# Patient Record
Sex: FEMALE | Race: ASIAN | NOT HISPANIC OR LATINO | Employment: OTHER | URBAN - METROPOLITAN AREA
[De-identification: names, ages, dates, MRNs, and addresses within clinical notes are randomized per-mention and may not be internally consistent; named-entity substitution may affect disease eponyms.]

---

## 2017-02-21 NOTE — TELEPHONE ENCOUNTER
Last seen: 12/02/16 by Dr. Najera  Next appt: 06/01/17 with Dr. Najera    Was the patient seen in the last year in this department? Yes   Does patient have an active prescription for medications requested? No   Received Request Via: Pharmacy

## 2017-02-22 RX ORDER — ATORVASTATIN CALCIUM 20 MG/1
TABLET, FILM COATED ORAL
Qty: 90 TAB | Refills: 2 | Status: SHIPPED | OUTPATIENT
Start: 2017-02-22 | End: 2017-10-12 | Stop reason: SDUPTHER

## 2017-09-05 ENCOUNTER — APPOINTMENT (OUTPATIENT)
Dept: SOCIAL WORK | Facility: CLINIC | Age: 77
End: 2017-09-05

## 2017-09-05 PROCEDURE — 90686 IIV4 VACC NO PRSV 0.5 ML IM: CPT | Performed by: REGISTERED NURSE

## 2017-09-25 ENCOUNTER — TELEPHONE (OUTPATIENT)
Dept: INTERNAL MEDICINE | Facility: MEDICAL CENTER | Age: 77
End: 2017-09-25

## 2017-09-25 DIAGNOSIS — E55.9 VITAMIN D DEFICIENCY: ICD-10-CM

## 2017-09-25 DIAGNOSIS — M85.80 OSTEOPENIA, UNSPECIFIED LOCATION: ICD-10-CM

## 2017-09-25 DIAGNOSIS — E78.5 HYPERLIPIDEMIA, UNSPECIFIED HYPERLIPIDEMIA TYPE: ICD-10-CM

## 2017-09-25 DIAGNOSIS — Z13.29 SCREENING FOR HYPOTHYROIDISM: ICD-10-CM

## 2017-09-25 NOTE — TELEPHONE ENCOUNTER
----- Message from Kirk Valladares sent at 2017  2:12 PM PDT -----  Regarding: NEW LAB ORDERS/SEES DR. BIRCH  Pt would like new lab orders sent to [facility]. Pt's labs on file are . Pt would like a call once labs have been faxed over.

## 2017-10-12 ENCOUNTER — OFFICE VISIT (OUTPATIENT)
Dept: INTERNAL MEDICINE | Facility: MEDICAL CENTER | Age: 77
End: 2017-10-12
Payer: COMMERCIAL

## 2017-10-12 VITALS
SYSTOLIC BLOOD PRESSURE: 90 MMHG | HEIGHT: 61 IN | WEIGHT: 109.2 LBS | DIASTOLIC BLOOD PRESSURE: 58 MMHG | HEART RATE: 68 BPM | BODY MASS INDEX: 20.62 KG/M2 | TEMPERATURE: 97.6 F | OXYGEN SATURATION: 95 %

## 2017-10-12 DIAGNOSIS — E78.5 HYPERLIPIDEMIA, UNSPECIFIED HYPERLIPIDEMIA TYPE: ICD-10-CM

## 2017-10-12 DIAGNOSIS — Z12.39 SCREENING FOR BREAST CANCER: ICD-10-CM

## 2017-10-12 DIAGNOSIS — E55.9 VITAMIN D DEFICIENCY: ICD-10-CM

## 2017-10-12 DIAGNOSIS — Z98.890 S/P COLONOSCOPY: ICD-10-CM

## 2017-10-12 DIAGNOSIS — H81.13 BENIGN PAROXYSMAL POSITIONAL VERTIGO DUE TO BILATERAL VESTIBULAR DISORDER: ICD-10-CM

## 2017-10-12 DIAGNOSIS — M85.80 OSTEOPENIA, UNSPECIFIED LOCATION: ICD-10-CM

## 2017-10-12 PROCEDURE — 99397 PER PM REEVAL EST PAT 65+ YR: CPT | Performed by: INTERNAL MEDICINE

## 2017-10-12 RX ORDER — ATORVASTATIN CALCIUM 20 MG/1
TABLET, FILM COATED ORAL
Qty: 90 TAB | Refills: 3 | Status: SHIPPED | OUTPATIENT
Start: 2017-10-12 | End: 2019-01-24 | Stop reason: SDUPTHER

## 2017-10-12 ASSESSMENT — PATIENT HEALTH QUESTIONNAIRE - PHQ9: CLINICAL INTERPRETATION OF PHQ2 SCORE: 0

## 2017-10-12 NOTE — PROGRESS NOTES
Chief Complaint   Patient presents with   • Annual Exam     preventative         HPI:  Antonio Treviño is a 76 y.o. here for Medicare Annual Wellness Visit She has no complaints today. 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     • therapeutic multivitamin-minerals (THERAGRAN-M) TABS Take 1 Tab by mouth every day.       • Vitamins-Lipotropics (LIPOFLAVONOID) TABS Take  by mouth.       • tizanidine (ZANAFLEX) 4 MG Tab Take 4 mg by mouth every 6 hours as needed.       No current facility-administered medications for this visit.             Current supplements as per medication list.       Allergies: Codeine and Morphine    Current social contact/activities: goes to [employer], walks and hiking     She  reports that she has never smoked. She has never used smokeless tobacco. She reports that she does not drink alcohol or use drugs.  Counseling given: Not Answered        DPA/Advanced Directive:  Patient has Living Will, but it is not on file. Instructed to bring in a copy to scan into their chart.      ROS:    Gait: Uses no assistive device   Ostomy: no   Other tubes: no   Amputations: no   Chronic oxygen use: no   Last eye exam:2016. Cataract extraction left eye.    : Denies incontinence.   In July patient had brief less than 24-hour episode of vertigo. Resolved spontaneously    Screening:    Depression Screening    Little  interest or pleasure in doing things?  0 - not at all  Feeling down, depressed , or hopeless? 0 - not at all  Patient Health Questionnaire Score: 0     If depressive symptoms identified deferred to follow up visit unless specifically addressed in assessment and plan.    Interpretation of PHQ-9 Total Score   Score Severity   1-4 No Depression   5-9 Mild Depression   10-14 Moderate Depression   15-19 Moderately Severe Depression   20-27 Severe Depression    Screening for Cognitive Impairment    Three Minute Recall (apple, watch, flora)   /3    Draw clock face with all 12 numbers set to the hand to show 10 minutes past 11 o'clock       Cognitive concerns identified deferred for follow up unless specifically addressed in assessment and plan.    Fall Risk Assessment    Has the patient had two or more falls in the last year or any fall with injury in the last year?  No    Safety Assessment    Throw rugs on floor.     Handrails on all stairs.     Good lighting in all hallways.     Difficulty hearing.     Patient counseled about all safety risks that were identified.    Functional Assessment ADLs    Are there any barriers preventing you from cooking for yourself or meeting nutritional needs?   .    Are there any barriers preventing you from driving safely or obtaining transportation?   .    Are there any barriers preventing you from using a telephone or calling for help?   .    Are there any barriers preventing you from shopping?   .    Are there any barriers preventing you from taking care of your own finances?   .    Are there any barriers preventing you from managing your medications?   .    Are currently engaging any exercise or physical activity?   .       Health Maintenance Summary                IMM DTaP/Tdap/Td Vaccine Overdue 12[birth year]1/1958     PAP SMEAR Overdue 3/1/2016      Done 3/1/2013 PAP IG, RFX HPV ASCU     Patient has more history with this topic...    MAMMOGRAM Overdue 8/25/2017      Done 8/25/2016 MA-SCREEN  "MAMMO W/CAD-BILAT     Patient has more history with this topic...    BONE DENSITY Next Due 10/19/2021      Done 10/19/2016 DS-BONE DENSITY STUDY (DEXA)     Patient has more history with this topic...    COLONOSCOPY Next Due 4/30/2024      Not specified 4/30/2014           Patient Care Team:  Woo Najera M.D. as PCP - General (Internal Medicine)      Social History   Substance Use Topics   • Smoking status: Never Smoker   • Smokeless tobacco: Never Used   • Alcohol use No     History reviewed. No pertinent family history.  She  has a past medical history of Dry eyes; Heart murmur; Hyperlipidemia; Osteopenia; OSTEOPOROSIS; and Vitiligo.   Past Surgical History:   Procedure Laterality Date   • CATARACT PHACO WITH IOL  7/2/2013    Performed by Bin Cardona M.D. at [facility]   • TENDON TRANSFER         Exam:     Blood pressure (!) 90/58, pulse 68, temperature 36.4 °C (97.6 °F), height 1.549 m (5' 1\"), weight 49.5 kg (109 lb 3.2 oz), SpO2 95 %. Body mass index is 20.63 kg/m².    Hearing excellent.    Dentition good  Alert, oriented in no acute distress.  Eye contact is good, speech goal directed, affect calm      Assessment and Plan. The following treatment and monitoring plan is recommended: 100  1. Osteopenia, unspecified location     2. Vitamin D deficiency     3. Hyperlipidemia, unspecified hyperlipidemia type     4. 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 S/P colonoscopy-2011 neg in [place]     6. Screening for breast cancer     1. Managed with vitamin D and weightbearing exercises. Continue. Patient status post at least 6 years of bisphosphonate therapy. Currently on drug holiday. Will repeat DEXA scan next year.  2.. Most recent vitamin D level 39.2 Continue vitamin D supplementation  3. Continue with atorvastatin. Repeat lipid panel  4. Stable. Consider use of Apley maneuvers. Recurrent  5. She currently is up-to-date on screening protocol. She is " performing mammograms every other year. Mammogram last year negative for breast cancer. We will order a bit test.        Services suggested: No services needed at this time  Health Care Screening: Age-appropriate preventive services Medicare covers discussed today and ordered if indicated.  Referrals offered: Community-based lifestyle interventions to reduce health risks and promote self-management and wellness, fall prevention, nutrition, physical activity, tobacco-use cessation, weight loss, and mental health services as per orders if indicated.    Discussion today about general wellness and lifestyle habits:    · Prevent falls and reduce trip hazards; Cautioned about securing or removing rugs.  · Have a working fire alarm and carbon monoxide detector;   · Engage in regular physical activity and social activities       Follow-up: No Follow-up on file.

## 2018-08-10 ENCOUNTER — HOSPITAL ENCOUNTER (OUTPATIENT)
Dept: RADIOLOGY | Facility: MEDICAL CENTER | Age: 78
End: 2018-08-10
Attending: OBSTETRICS & GYNECOLOGY
Payer: COMMERCIAL

## 2018-08-10 DIAGNOSIS — Z12.31 VISIT FOR SCREENING MAMMOGRAM: ICD-10-CM

## 2018-08-10 PROCEDURE — 77067 SCR MAMMO BI INCL CAD: CPT

## 2018-09-26 LAB
ALBUMIN SERPL-MCNC: 4.3 G/DL (ref 3.5–4.8)
ALBUMIN/GLOB SERPL: 1.6 {RATIO} (ref 1.2–2.2)
ALP SERPL-CCNC: 73 IU/L (ref 39–117)
ALT SERPL-CCNC: 16 IU/L (ref 0–32)
AST SERPL-CCNC: 20 IU/L (ref 0–40)
BILIRUB SERPL-MCNC: 0.5 MG/DL (ref 0–1.2)
BUN SERPL-MCNC: 15 MG/DL (ref 8–27)
BUN/CREAT SERPL: 19 (ref 12–28)
CALCIUM SERPL-MCNC: 9.8 MG/DL (ref 8.7–10.3)
CHLORIDE SERPL-SCNC: 104 MMOL/L (ref 96–106)
CHOLEST SERPL-MCNC: 246 MG/DL (ref 100–199)
CO2 SERPL-SCNC: 24 MMOL/L (ref 20–29)
CREAT SERPL-MCNC: 0.81 MG/DL (ref 0.57–1)
GLOBULIN SER CALC-MCNC: 2.7 G/DL (ref 1.5–4.5)
GLUCOSE SERPL-MCNC: 98 MG/DL (ref 65–99)
HDLC SERPL-MCNC: 63 MG/DL
IF AFRICAN AMERICAN  100797: 80 ML/MIN/1.73
IF NON AFRICAN AMER 100791: 70 ML/MIN/1.73
LABORATORY COMMENT REPORT: ABNORMAL
LDLC SERPL CALC-MCNC: 163 MG/DL (ref 0–99)
POTASSIUM SERPL-SCNC: 4.5 MMOL/L (ref 3.5–5.2)
PROT SERPL-MCNC: 7 G/DL (ref 6–8.5)
SODIUM SERPL-SCNC: 142 MMOL/L (ref 134–144)
TRIGL SERPL-MCNC: 101 MG/DL (ref 0–149)
VLDLC SERPL CALC-MCNC: 20 MG/DL (ref 5–40)

## 2019-01-24 ENCOUNTER — OFFICE VISIT (OUTPATIENT)
Dept: INTERNAL MEDICINE | Facility: MEDICAL CENTER | Age: 79
End: 2019-01-24
Payer: COMMERCIAL

## 2019-01-24 VITALS
DIASTOLIC BLOOD PRESSURE: 60 MMHG | OXYGEN SATURATION: 94 % | RESPIRATION RATE: 20 BRPM | HEART RATE: 68 BPM | TEMPERATURE: 97.6 F | SYSTOLIC BLOOD PRESSURE: 110 MMHG | BODY MASS INDEX: 20.09 KG/M2 | WEIGHT: 109.2 LBS | HEIGHT: 62 IN

## 2019-01-24 DIAGNOSIS — H81.13 BENIGN PAROXYSMAL POSITIONAL VERTIGO DUE TO BILATERAL VESTIBULAR DISORDER: ICD-10-CM

## 2019-01-24 DIAGNOSIS — E55.9 VITAMIN D DEFICIENCY: ICD-10-CM

## 2019-01-24 DIAGNOSIS — S32.030S CLOSED COMPRESSION FRACTURE OF THIRD LUMBAR VERTEBRA, SEQUELA: ICD-10-CM

## 2019-01-24 DIAGNOSIS — E78.5 HYPERLIPIDEMIA, UNSPECIFIED HYPERLIPIDEMIA TYPE: ICD-10-CM

## 2019-01-24 DIAGNOSIS — M85.80 OSTEOPENIA, UNSPECIFIED LOCATION: ICD-10-CM

## 2019-01-24 PROCEDURE — G0439 PPPS, SUBSEQ VISIT: HCPCS | Performed by: INTERNAL MEDICINE

## 2019-01-24 RX ORDER — ATORVASTATIN CALCIUM 20 MG/1
TABLET, FILM COATED ORAL
Qty: 90 TAB | Refills: 0 | Status: SHIPPED | OUTPATIENT
Start: 2019-01-24 | End: 2019-05-14

## 2019-01-24 RX ORDER — CELECOXIB 200 MG/1
200 CAPSULE ORAL DAILY
COMMUNITY
End: 2019-10-28

## 2019-01-24 NOTE — PATIENT INSTRUCTIONS
Schedule bone density test.  I will refer you to endocrine  Follow up with Celine  Get labs in one month  Stop the atorvastatin

## 2019-01-24 NOTE — PROGRESS NOTES
Chief Complaint   Patient presents with   • Annual Exam   • Medication Refill     lipitor          HPI:  Antonio Treviño is a 78 y.o. here for Medicare Annual Wellness Visit   She was seen by Dr. Berger at pain clinic for tingling and numbness for several months. Numbness in the left foot to ankle and right foot, ankle, leg to mid thigh. No pain. Walking makes it worse.Symptoms for 6 months. Gradually.No weakness.  She received an epidural injection and prescribed Celebrex in December. No effect on numnbess. She is seeing him tomorrow. She had another MRI and will go over the results with him tomorrow.   She was having cramps in the lgs. And then she stopped all the medications. She went back on the medications and no cramps. She is taking a statin. She saw her cholesterol level in sept and she resumed the medication  She had a bone density in 2016 with Hip fracture risk 4.3. In the past she took Actonel  X 5 years over 6-7 years ago. History of compression fracture of vertebrate and left elbow.  Takes celebrex for back pain for 1.5 month from dr. Berger.     Patient Active Problem List    Diagnosis Date Noted   • Closed compression fracture of third lumbar vertebra (HCC) 10/07/2016   • Benign paroxysmal positional vertigo due to bilateral vestibular disorder 10/07/2016   • Vitamin D deficiency 04/30/2014   • Systolic murmur 04/29/2013   • Osteopenia 01/29/2013   • HLD (hyperlipidemia) 01/29/2013   • S/P colonoscopy-2011 neg in [place] 01/29/2013       Current Outpatient Prescriptions   Medication Sig Dispense Refill   • celecoxib (CELEBREX) 200 MG Cap Take 200 mg by mouth every day.     • atorvastatin (LIPITOR) 20 MG Tab Take one tablet by mouth daily 90 Tab 3   • Omega-3 Fatty Acids ([facility] FISH OIL PO) Take  by mouth.     • therapeutic multivitamin-minerals (THERAGRAN-M) TABS Take 1 Tab by mouth every day.       • Vitamins-Lipotropics (LIPOFLAVONOID) TABS Take  by mouth.       • tizanidine (ZANAFLEX) 4 MG Tab Take 4 mg  by mouth every 6 hours as needed.       No current facility-administered medications for this visit.             Current supplements as per medication list.       Allergies: Codeine and Morphine    Current social contact/activities: hiking and walking.      She  reports that she has never smoked. She has never used smokeless tobacco. She reports that she does not drink alcohol or use drugs.  Counseling given: Not Answered      DPA/Advanced Directive:  Patient has Advanced Directive, but it is not on file. Instructed to bring in a copy to scan into their chart.    ROS:    Gait: Uses no assistive device  Ostomy: No  Other tubes: No  Amputations: No  Chronic oxygen use: No  Last eye exam: 1.5 years ago  Wears hearing aids: No   : Denies any urinary leakage during the last 6 months    Screening:  No depression  Depression Screening    Little interest or pleasure in doing things?0     Feeling down, depressed , or hopeless?0     0Trouble falling or staying asleep, or sleeping too much?     Feeling tired or having little energy?     Poor appetite or overeating?     Feeling bad about yourself - or that you are a failure or have let yourself or your family down?    Trouble concentrating on things, such as reading the newspaper or watching television?    Moving or speaking so slowly that other people could have noticed.  Or the opposite - being so fidgety or restless that you have been moving around a lot more than usual?     Thoughts that you would be better off dead, or of hurting yourself?     Patient Health Questionnaire Score:      If depressive symptoms identified deferred to follow up visit unless specifically addressed in assessment and plan.    Interpretation of PHQ-9 Total Score   Score Severity   1-4 No Depression   5-9 Mild Depression   10-14 Moderate Depression   15-19 Moderately Severe Depression   20-27 Severe Depression      Screening for Cognitive Impairment    Three Minute Recall (leader, season, table)   /3    Juice clock face with all 12 numbers and set the hands to show 10 past 11.       Cognitive concerns identified deferred for follow up unless specifically addressed in assessment and plan.    Fall Risk Assessment    Has the patient had two or more falls in the last year or any fall with injury in the last year?       Safety Assessment    Throw rugs on floor.     Handrails on all stairs.     Good lighting in all hallways.     Difficulty hearing.     Patient counseled about all safety risks that were identified.    Functional Assessment ADLs    Are there any barriers preventing you from cooking for yourself or meeting nutritional needs?   .    Are there any barriers preventing you from driving safely or obtaining transportation?   .    Are there any barriers preventing you from using a telephone or calling for help?   .    Are there any barriers preventing you from shopping?   .    Are there any barriers preventing you from taking care of your own finances?   .    Are there any barriers preventing you from managing your medications?   .    Are there any barriers preventing you from showering, bathing or dressing yourself?  .    Are you currently engaging in any exercise or physical activity?   .     What is your perception of your health?   .      Health Maintenance Summary                IMM DTaP/Tdap/Td Vaccine Overdue 12/1/1959     IMM ZOSTER VACCINES Overdue 8/24/2012      Done 6/29/2012 Imm Admin: Zoster Vaccine Live (ZVL) (Zostavax)    PAP SMEAR Overdue 3/1/2016      Done 3/1/2013 PAP IG, RFX HPV ASCU     Patient has more history with this topic...    IMM INFLUENZA Overdue 9/1/2018      Done 9/5/2017 Imm Admin: Influenza Vaccine Quad Inj (Pf)     Patient has more history with this topic...    MAMMOGRAM Next Due 8/10/2019      Done 8/10/2018 MA-MAMMO SCREENING BILAT W/TOMOSYNTHESIS W/CAD     Patient has more history with this topic...    BONE DENSITY Next Due 10/19/2021      Done 10/19/2016 DS-BONE DENSITY  "STUDY (DEXA)     Patient has more history with this topic...    COLONOSCOPY Next Due 4/30/2024      Not specified 4/30/2014           Patient Care Team:  Woo Najera M.D. as PCP - General (Internal Medicine)      Social History   Substance Use Topics   • Smoking status: Never Smoker   • Smokeless tobacco: Never Used   • Alcohol use No     No family history on file.  She  has a past medical history of Dry eyes; Heart murmur; Hyperlipidemia; Osteopenia; OSTEOPOROSIS; and Vitiligo.   Past Surgical History:   Procedure Laterality Date   • CATARACT PHACO WITH IOL  7/2/2013    Performed by Bin Cardona M.D. at [facility]   • TENDON TRANSFER         Exam:   Blood pressure 110/60, pulse 68, temperature 36.4 °C (97.6 °F), temperature source Temporal, resp. rate 20, height 1.57 m (5' 1.81\"), weight 49.5 kg (109 lb 3.2 oz), SpO2 94 %, not currently breastfeeding. Body mass index is 20.1 kg/m².    Hearing excellent.    Dentition good  Alert, oriented in no acute distress.  Eye contact is good, speech goal directed, affect calm    Assessment and Plan. The following treatment and monitoring plan is recommended:  1.  History of compression fracture of the lumbar spine.  Patient completed 5 years of bisphosphonate therapy prior to that.  Has been off of bisphosphonate therapy for 6 or 7 years.  Is interested in using Prolia as a treatment.  I will refer her to endocrinology for an evaluation.  We will also order a bone density.  Continue vitamin D and calcium supplementation.  2.  Vitamin D deficiency-continue vitamin D supplementation.  3.  History of BPPV responded well to Epley maneuvers in the past.  She can continue this in the event of another flare.  4.  Dyslipidemia we discussed the use of statin therapy in primary prevention.  At this point she wants to discontinue which is reasonable.  No diagnosis found.      Services suggested: No services needed at this time  Health Care Screening: " Age-appropriate preventive services recommended by USPTF and ACIP covered by Medicare were discussed today. Services ordered if indicated and agreed upon by the patient.  Referrals offered: Community-based lifestyle interventions to reduce health risks and promote self-management and wellness, fall prevention, nutrition, physical activity, tobacco-use cessation, weight loss, and mental health services as per orders if indicated.    Discussion today about general wellness and lifestyle habits:    · Prevent falls and reduce trip hazards; Cautioned about securing or removing rugs.  · Have a working fire alarm and carbon monoxide detector;   · Engage in regular physical activity and social activities     Follow-up: No Follow-up on file.

## 2019-02-01 ENCOUNTER — HOSPITAL ENCOUNTER (OUTPATIENT)
Dept: RADIOLOGY | Facility: MEDICAL CENTER | Age: 79
End: 2019-02-01
Attending: INTERNAL MEDICINE
Payer: COMMERCIAL

## 2019-02-01 DIAGNOSIS — M85.80 OSTEOPENIA, UNSPECIFIED LOCATION: ICD-10-CM

## 2019-02-01 DIAGNOSIS — S32.030S CLOSED COMPRESSION FRACTURE OF THIRD LUMBAR VERTEBRA, SEQUELA: ICD-10-CM

## 2019-02-01 DIAGNOSIS — E55.9 VITAMIN D DEFICIENCY: ICD-10-CM

## 2019-02-01 PROCEDURE — 77080 DXA BONE DENSITY AXIAL: CPT

## 2019-02-27 ENCOUNTER — HOSPITAL ENCOUNTER (OUTPATIENT)
Dept: LAB | Facility: MEDICAL CENTER | Age: 79
End: 2019-02-27
Attending: INTERNAL MEDICINE
Payer: COMMERCIAL

## 2019-02-27 DIAGNOSIS — S32.030S CLOSED COMPRESSION FRACTURE OF THIRD LUMBAR VERTEBRA, SEQUELA: ICD-10-CM

## 2019-02-27 DIAGNOSIS — M85.80 OSTEOPENIA, UNSPECIFIED LOCATION: ICD-10-CM

## 2019-02-27 DIAGNOSIS — E55.9 VITAMIN D DEFICIENCY: ICD-10-CM

## 2019-02-27 LAB
25(OH)D3 SERPL-MCNC: 39 NG/ML (ref 30–100)
ALBUMIN SERPL BCP-MCNC: 4.1 G/DL (ref 3.2–4.9)
ALBUMIN/GLOB SERPL: 1.6 G/DL
ALP SERPL-CCNC: 62 U/L (ref 30–99)
ALT SERPL-CCNC: 16 U/L (ref 2–50)
ANION GAP SERPL CALC-SCNC: 7 MMOL/L (ref 0–11.9)
AST SERPL-CCNC: 22 U/L (ref 12–45)
BASOPHILS # BLD AUTO: 0.7 % (ref 0–1.8)
BASOPHILS # BLD: 0.03 K/UL (ref 0–0.12)
BILIRUB SERPL-MCNC: 0.7 MG/DL (ref 0.1–1.5)
BUN SERPL-MCNC: 19 MG/DL (ref 8–22)
CALCIUM SERPL-MCNC: 9.4 MG/DL (ref 8.5–10.5)
CHLORIDE SERPL-SCNC: 105 MMOL/L (ref 96–112)
CHOLEST SERPL-MCNC: 233 MG/DL (ref 100–199)
CO2 SERPL-SCNC: 28 MMOL/L (ref 20–33)
CREAT SERPL-MCNC: 0.79 MG/DL (ref 0.5–1.4)
EOSINOPHIL # BLD AUTO: 0.06 K/UL (ref 0–0.51)
EOSINOPHIL NFR BLD: 1.5 % (ref 0–6.9)
ERYTHROCYTE [DISTWIDTH] IN BLOOD BY AUTOMATED COUNT: 45.1 FL (ref 35.9–50)
FASTING STATUS PATIENT QL REPORTED: NORMAL
GLOBULIN SER CALC-MCNC: 2.6 G/DL (ref 1.9–3.5)
GLUCOSE SERPL-MCNC: 90 MG/DL (ref 65–99)
HCT VFR BLD AUTO: 44.6 % (ref 37–47)
HDLC SERPL-MCNC: 58 MG/DL
HGB BLD-MCNC: 14.9 G/DL (ref 12–16)
IMM GRANULOCYTES # BLD AUTO: 0.01 K/UL (ref 0–0.11)
IMM GRANULOCYTES NFR BLD AUTO: 0.2 % (ref 0–0.9)
LDLC SERPL CALC-MCNC: 156 MG/DL
LYMPHOCYTES # BLD AUTO: 0.99 K/UL (ref 1–4.8)
LYMPHOCYTES NFR BLD: 24.1 % (ref 22–41)
MCH RBC QN AUTO: 33.7 PG (ref 27–33)
MCHC RBC AUTO-ENTMCNC: 33.4 G/DL (ref 33.6–35)
MCV RBC AUTO: 100.9 FL (ref 81.4–97.8)
MONOCYTES # BLD AUTO: 0.43 K/UL (ref 0–0.85)
MONOCYTES NFR BLD AUTO: 10.5 % (ref 0–13.4)
NEUTROPHILS # BLD AUTO: 2.58 K/UL (ref 2–7.15)
NEUTROPHILS NFR BLD: 63 % (ref 44–72)
NRBC # BLD AUTO: 0 K/UL
NRBC BLD-RTO: 0 /100 WBC
PLATELET # BLD AUTO: 239 K/UL (ref 164–446)
PMV BLD AUTO: 11.4 FL (ref 9–12.9)
POTASSIUM SERPL-SCNC: 3.9 MMOL/L (ref 3.6–5.5)
PROT SERPL-MCNC: 6.7 G/DL (ref 6–8.2)
RBC # BLD AUTO: 4.42 M/UL (ref 4.2–5.4)
SODIUM SERPL-SCNC: 140 MMOL/L (ref 135–145)
TRIGL SERPL-MCNC: 93 MG/DL (ref 0–149)
TSH SERPL DL<=0.005 MIU/L-ACNC: 4.9 UIU/ML (ref 0.38–5.33)
WBC # BLD AUTO: 4.1 K/UL (ref 4.8–10.8)

## 2019-02-27 PROCEDURE — 80061 LIPID PANEL: CPT

## 2019-02-27 PROCEDURE — 80053 COMPREHEN METABOLIC PANEL: CPT

## 2019-02-27 PROCEDURE — 85025 COMPLETE CBC W/AUTO DIFF WBC: CPT

## 2019-02-27 PROCEDURE — 84443 ASSAY THYROID STIM HORMONE: CPT

## 2019-02-27 PROCEDURE — 36415 COLL VENOUS BLD VENIPUNCTURE: CPT

## 2019-02-27 PROCEDURE — 82306 VITAMIN D 25 HYDROXY: CPT

## 2019-03-05 ENCOUNTER — OFFICE VISIT (OUTPATIENT)
Dept: INTERNAL MEDICINE | Facility: MEDICAL CENTER | Age: 79
End: 2019-03-05
Payer: COMMERCIAL

## 2019-03-05 VITALS
BODY MASS INDEX: 20.32 KG/M2 | DIASTOLIC BLOOD PRESSURE: 60 MMHG | TEMPERATURE: 97.5 F | HEART RATE: 66 BPM | SYSTOLIC BLOOD PRESSURE: 90 MMHG | HEIGHT: 62 IN | WEIGHT: 110.4 LBS | OXYGEN SATURATION: 94 %

## 2019-03-05 DIAGNOSIS — M85.80 OSTEOPENIA, UNSPECIFIED LOCATION: ICD-10-CM

## 2019-03-05 DIAGNOSIS — E78.5 HYPERLIPIDEMIA, UNSPECIFIED HYPERLIPIDEMIA TYPE: ICD-10-CM

## 2019-03-05 PROCEDURE — 99213 OFFICE O/P EST LOW 20 MIN: CPT | Performed by: INTERNAL MEDICINE

## 2019-03-05 RX ORDER — GABAPENTIN 100 MG/1
CAPSULE ORAL
COMMUNITY
Start: 2019-02-20 | End: 2019-07-30

## 2019-03-05 ASSESSMENT — PATIENT HEALTH QUESTIONNAIRE - PHQ9: CLINICAL INTERPRETATION OF PHQ2 SCORE: 0

## 2019-03-05 NOTE — PROGRESS NOTES
Established Patient    Ms. Calderon a 78 y.o. female who presents today with:  CC: Follow-Up (closed compression fracture)        Assessment and Plan    1.  Osteopenia-she has a history of osteoporotic fracture.  She has been prescribed bisphosphonate therapy for greater than 5 years.  Her most recent bone density shows osteopenia in the left hip with a hip fracture risk of 5.3%.  She has an appointment pending with endocrinology.  To discuss treatment options.  In the past she has had side effects with bisphosphonates plus she is been treated for more than 5 years.  Suspect that she will be a candidate for Prolia.  Continue vitamin D supplementation although she may increase vitamin D to achieve a serum level of 50.    2.  Hyperlipidemia-she had statin induced myalgias.  She is over the age of 74.  She does not have many risk factors for arterial sclerosis.  We discussed the use of statins for primary prevention.  She does not like to take medications.  We both agree that we will discontinue statin therapy for now.    Current Outpatient Prescriptions   Medication Sig Dispense Refill   • gabapentin (NEURONTIN) 100 MG Cap      • celecoxib (CELEBREX) 200 MG Cap Take 200 mg by mouth every day.     • Omega-3 Fatty Acids ([facility] FISH OIL PO) Take  by mouth.     • therapeutic multivitamin-minerals (THERAGRAN-M) TABS Take 1 Tab by mouth every day.       • atorvastatin (LIPITOR) 20 MG Tab Take one tablet by mouth daily (Patient not taking: Reported on 3/5/2019) 90 Tab 0   • Vitamins-Lipotropics (LIPOFLAVONOID) TABS Take  by mouth.         No current facility-administered medications for this visit.          followup No Follow-up on file.    This note was created using voice recognition software (Dragon). The accuracy of the dictation is limited by the abilities of the software. I have reviewed the note prior to signing, however some errors in grammar and context are still possible. If you have any questions related to this note  please do not hesitate to contact our office.   _______________________________________________________    HPI:   List: osteopenia and fracture, lipids  Ms. Treviño is here for follow-up for dyslipidemia and osteopenia.  She was being treated with statin for primary prevention.  In October 2018 she developed arthralgias and muscle aches and she discontinue the medication.  Symptoms resolved.  Subsequent lipid panel in September 2018 total cholesterol 246 triglycerides 101 HDL 63 .  She resume the statins late last year and developed similar symptoms and she discontinued it again about a month ago.  Subsequent lipid panel in February 2019 total cholesterol 233, triglycerides 93, HDL 58, .  She does not smoke.  She has no impaired fasting glucose or diabetes.  No family history.  Diet consists mostly of vegetables and fish.  She exercises daily.  Her BMI is 20.32.  She has a history of a lumbar compression fracture diagnosed via MRI of the lumbar spine January 2019.  She had a prior history of osteopenia/osteoporosis and was treated with bisphosphonate therapy for greater than 5 years.  She is not receive therapy in the form of bisphosphonates for several years.  She has a history of gastroesophageal reflux disease and bisphosphonate therapy exacerbated this.  Her most recent bone density result we discussed today.  She has normal bone density throughout except for the left femur which is osteopenic.  Her hip fracture risk is 5.3 major osteoporotic is 15%.  Her most recent vitamin D level is 39.  She does weightbearing exercises every day.  She does not smoke.  No chronic steroids.  No alcohol use.  No family history of osteoporosis but she does have a compression fracture.  At the last visit I referred her to endocrinology.  She has an appointment pending with them in May.  She is not interested in bisphosphonate therapy we discussed alternatives such as Forteo or Prolia.  She will discussed this with  "endocrinology at that consultation.         has a past medical history of Dry eyes; Heart murmur; Hyperlipidemia; Osteopenia; OSTEOPOROSIS; and Vitiligo.     reports that she has never smoked. She has never used smokeless tobacco. She reports that she does not drink alcohol or use drugs.      ROS: Pertinent positives as stated in HPI, all others reviewed as negative:        Physical Exam  BP (!) 90/60 (BP Location: Left arm, Patient Position: Sitting, BP Cuff Size: Adult)   Pulse 66   Temp 36.4 °C (97.5 °F) (Temporal)   Ht 1.57 m (5' 1.81\")   Wt 50.1 kg (110 lb 6.4 oz)   SpO2 94%   BMI 20.32 kg/m²   Constitutional:  oriented to person, place, and time. No distress.       Current Outpatient Prescriptions on File Prior to Visit   Medication Sig Dispense Refill   • celecoxib (CELEBREX) 200 MG Cap Take 200 mg by mouth every day.     • Omega-3 Fatty Acids ([facility] FISH OIL PO) Take  by mouth.     • therapeutic multivitamin-minerals (THERAGRAN-M) TABS Take 1 Tab by mouth every day.       • atorvastatin (LIPITOR) 20 MG Tab Take one tablet by mouth daily (Patient not taking: Reported on 3/5/2019) 90 Tab 0   • Vitamins-Lipotropics (LIPOFLAVONOID) TABS Take  by mouth.         No current facility-administered medications on file prior to visit.            Signed by: Woo Najera M.D.  "

## 2019-05-14 ENCOUNTER — OFFICE VISIT (OUTPATIENT)
Dept: ENDOCRINOLOGY | Facility: MEDICAL CENTER | Age: 79
End: 2019-05-14
Payer: COMMERCIAL

## 2019-05-14 VITALS
BODY MASS INDEX: 20.43 KG/M2 | HEART RATE: 82 BPM | DIASTOLIC BLOOD PRESSURE: 56 MMHG | SYSTOLIC BLOOD PRESSURE: 106 MMHG | WEIGHT: 111 LBS | HEIGHT: 62 IN | OXYGEN SATURATION: 93 %

## 2019-05-14 DIAGNOSIS — M85.80 OSTEOPENIA, UNSPECIFIED LOCATION: ICD-10-CM

## 2019-05-14 DIAGNOSIS — E55.9 VITAMIN D DEFICIENCY: ICD-10-CM

## 2019-05-14 DIAGNOSIS — Z87.81 HISTORY OF VERTEBRAL COMPRESSION FRACTURE: ICD-10-CM

## 2019-05-14 DIAGNOSIS — M80.80XD LOCALIZED OSTEOPOROSIS WITH CURRENT PATHOLOGICAL FRACTURE WITH ROUTINE HEALING, SUBSEQUENT ENCOUNTER: ICD-10-CM

## 2019-05-14 PROCEDURE — 99204 OFFICE O/P NEW MOD 45 MIN: CPT | Performed by: INTERNAL MEDICINE

## 2019-05-14 NOTE — PROGRESS NOTES
New Patient Consult Note  Primary care physician: Woo Najera M.D.    Reason for consult: Osteopenia    HPI:  Antonio Treviño is a 78 y.o. old patient who comes in today for evaluation of Osteopenia with history of compression fracture of the third lumbar vertebrae and Vitamin D Deficiency.  She was on Actonel 35 mg weekly for over 5 years and stopped in 2012..  Her Dexa scan on 2/1/19 shows the mean bone mineral density for the lumbar spine is 1.072 g/cm2, which corresponds to a T score of -0.8 and a Z score of 1.5.  The proximal left femur has a mean bone mineral density of 0.709 g/cm2, with a T score of -2.4 and a Z score of -0.1.  When compared with the most recent study dated 10/19/2016, there has been a 0.9% decrease in the bone mineral density of the lumbar spine and a 6.3% decrease in the bone mineral density of the left femur.  She is having tightness and tingling around ankles and lower leg with right side more than left.  She had 2 epidermals in her back that did not help.      ROS:  Constitutional: No weight loss  Cardiac: No palpitations or racing heart  Resp: No shortness of breath  Neuro: No numbness or tinging in feet  Endo: No heat or cold intolerance, no polyuria or polydipsia  All other systems were reviewed and were negative.    Past Medical History:  Patient Active Problem List    Diagnosis Date Noted   • Closed compression fracture of third lumbar vertebra (HCC) 10/07/2016   • Benign paroxysmal positional vertigo due to bilateral vestibular disorder 10/07/2016   • Vitamin D deficiency 04/30/2014   • Systolic murmur 04/29/2013   • Osteopenia 01/29/2013   • HLD (hyperlipidemia) 01/29/2013   • S/P colonoscopy-2011 neg in [place] 01/29/2013       Past Surgical History:  Past Surgical History:   Procedure Laterality Date   • CATARACT PHACO WITH IOL  7/2/2013    Performed by Bin Cardona M.D. at [facility]   • TENDON TRANSFER         Allergies:  Codeine and Morphine    Social  "History:  Social History     Social History   • Marital status:      Spouse name: N/A   • Number of children: N/A   • Years of education: N/A     Occupational History   • Not on file.     Social History Main Topics   • Smoking status: Never Smoker   • Smokeless tobacco: Never Used   • Alcohol use No   • Drug use: No   • Sexual activity: Not on file     Other Topics Concern   • Not on file     Social History Narrative   • No narrative on file       Family History:  History reviewed. No pertinent family history.    Medications:    Current Outpatient Prescriptions:   •  Teriparatide, Recombinant, (FORTEO) 600 MCG/2.4ML Solution, Inject 20 mcg as instructed every day., Disp: 1 PEN, Rfl: 11  •  gabapentin (NEURONTIN) 100 MG Cap, , Disp: , Rfl:   •  celecoxib (CELEBREX) 200 MG Cap, Take 200 mg by mouth every day., Disp: , Rfl:   •  Omega-3 Fatty Acids ([employer] FISH OIL PO), Take  by mouth., Disp: , Rfl:   •  therapeutic multivitamin-minerals (THERAGRAN-M) TABS, Take 1 Tab by mouth every day.  , Disp: , Rfl:   •  Vitamins-Lipotropics (LIPOFLAVONOID) TABS, Take  by mouth.  , Disp: , Rfl:     Labs: Reviewed    Physical Examination:  Vital signs: /56   Pulse 82   Ht 1.575 m (5' 2\")   Wt 50.3 kg (111 lb)   SpO2 93%   BMI 20.30 kg/m²  Body mass index is 20.3 kg/m². Patient's body mass index is 20.3 kg/m². Exercise and nutrition counseling were performed at this visit.  Going to physical therapy and doing the exercises for 1 hour daily.  General: No apparent distress, cooperative,thin  Eyes: No scleral icterus or discharge  ENMT: Normal on external inspection of nose, lips, normal thyroid exam  Neck: No abnormal masses on inspection  Resp: Normal effort, clear to auscultation bilaterally   CVS: Regular rate and rhythm, S1 S2 normal, no murmur   Extremities: No edema  Abdomen: no abdominal obesity present  Neuro: Alert and oriented  Skin: No rash  Psych: Normal mood and affect, intact memory and able to make " informed decisions    Assessment and Plan:    1.  Osteoporosis with pathologic fracture of the lumbar vertebrae  Discussed the side effects and benefits of Forteo therapy she will greatly benefit from it.  Paperwork will be sent over today.    2. History of vertebral compression fracture  Plan as per assessment #1.    3. Vitamin D deficiency  continue vitamin D supplementation.     Return in about 3 months (around 8/14/2019).    Thank you for allowing me to participate in the care of this patient.    Efra Mckeon  05/14/19  This note was scribed by Francheska Borjas RN CDE  CC:   Woo Najera M.D.    This note was created using voice recognition software (Dragon). The accuracy of the dictation is limited by the abilities of the software. I have reviewed the note prior to signing, however some errors in grammar and context are still possible. If you have any questions related to this note please do not hesitate to contact our office.

## 2019-05-16 ENCOUNTER — TELEPHONE (OUTPATIENT)
Dept: ENDOCRINOLOGY | Facility: MEDICAL CENTER | Age: 79
End: 2019-05-16

## 2019-05-16 NOTE — TELEPHONE ENCOUNTER
FINAL PRIOR AUTHORIZATION STATUS:    1.  Name of Medication & Dose: Forteo 600mcg     2. Prior Auth Status (if approved--length of approval):  Approved  Approvedtoday  Your PA request has been approved. Additional information will be provided in the approval communication.      3. Action Taken: Pharmacy Notified: yes    Documentation was scanned into media and attached to this telephone encounter.

## 2019-05-21 ENCOUNTER — TELEPHONE (OUTPATIENT)
Dept: ENDOCRINOLOGY | Facility: MEDICAL CENTER | Age: 79
End: 2019-05-21

## 2019-05-21 NOTE — TELEPHONE ENCOUNTER
1. Caller Name: Antonio Treviño                                         Call Back Number: [phone] (home)       Patient approves a detailed voicemail message: yes    starting forteo     Pt will be starting Forteo and needs education on how it give it to her self and may have some other questions regarding the injection itself. It is ready for her to pick it up ay her pharmacy but she needs help with education on it.     Consulted with Angelina and came to conclusion that it would be best if Francheska (one of Dr. KELLER's nurses) could get in contact with her regarding training on giving herself this injection.     Informed the Pt that I will be sending a note to Francheska regarding this and we will be in contact with her.

## 2019-07-29 NOTE — PROGRESS NOTES
"Endocrinology Clinic Progress Note  PCP: Woo Najera M.D.    HPI:  Antonio Treviño is a 78 y.o. old patient who comes in today for review of endocrine problems.    Osteoporosis with history of  pathologic fracture of the lumbar vertebrae:  Started on Forteo 20 mcg daily for 2 1/2 months.  She states her legs feel heavy and swell in the afternoon after starting the Forteo.    Vitamin D Deficiency:  Currently taking Vitamin D daily 5000 units plus another 1000 units with her Calcium.    ROS:  Constitutional: No unintentional weight loss  Endo: Denies excessive thirst or frequent urination  Extremity: Bilateral lower leg heaviness  All other systems were reviewed and were negative.    Past Medical History:  Patient Active Problem List    Diagnosis Date Noted   • Closed compression fracture of third lumbar vertebra (HCC) 10/07/2016   • Benign paroxysmal positional vertigo due to bilateral vestibular disorder 10/07/2016   • Vitamin D deficiency 04/30/2014   • Systolic murmur 04/29/2013   • Osteopenia 01/29/2013   • HLD (hyperlipidemia) 01/29/2013   • S/P colonoscopy-2011 neg in [place] 01/29/2013       Medications:    Current Outpatient Prescriptions:   •  Cholecalciferol (VITAMIN D HIGH POTENCY PO), Take 6,000 Units by mouth every day., Disp: , Rfl:   •  Calcium Carbonate (CALCIUM 600) 600 MG Tab, Take  by mouth., Disp: , Rfl:   •  Teriparatide, Recombinant, (FORTEO) 600 MCG/2.4ML Solution, Inject 20 mcg as instructed every day., Disp: 1 PEN, Rfl: 11  •  celecoxib (CELEBREX) 200 MG Cap, Take 200 mg by mouth every day., Disp: , Rfl:   •  Omega-3 Fatty Acids ([employer]S FISH OIL PO), Take  by mouth., Disp: , Rfl:   •  therapeutic multivitamin-minerals (THERAGRAN-M) TABS, Take 1 Tab by mouth every day.  , Disp: , Rfl:   •  Vitamins-Lipotropics (LIPOFLAVONOID) TABS, Take  by mouth.  , Disp: , Rfl:     Labs: Reviewed    Physical Examination:  Vital signs: /64   Pulse 79   Ht 1.575 m (5' 2\")   Wt 49.9 kg (110 lb)   SpO2 " 94%   BMI 20.12 kg/m²  Body mass index is 20.12 kg/m². Patient's body mass index is 20.12 kg/m². Exercise and nutrition counseling were performed at this visit.  Physical therapy exercises and walking for 45 minutes daily.  General: No apparent distress, cooperative  Eyes: No scleral icterus, no discharge, normal eyelids  Neck: No abnormal masses on inspection, normal thyroid exam  Resp: Normal effort, clear to auscultation bilaterally  CVS: Regular rate and rhythm, S1 S2 normal, no murmur  Extremities: No lower extremity edema  Abdomen: no abdominal obesity present  Musculoskeletal: Normal digits and nails  Skin: No rash on visible skin  Psych: Alert and oriented, normal mood and affect, intact memory and able to make informed decisions.    Assessment and Plan:    1. Vitamin D deficiency  Continue vitamin D supplementation    2. Age related osteoporosis, unspecified pathological fracture presence  Continue Forteo therapy.  Heaviness in the lower legs could be due to new bone growth possibly.  No obvious and/or identifiable lower extremity pedal edema.    Return in about 3 months (around 10/30/2019).    Thank you for allowing me to participate in the care of this patient.    Efra Mckeon M.D.  This note was scribed by Francheska Borjas RN, CDE  CC:   Woo Najera M.D.    This note was created using voice recognition software (Dragon). The accuracy of the dictation is limited by the abilities of the software. I have reviewed the note prior to signing, however some errors in grammar and context are still possible. If you have any questions related to this note please do not hesitate to contact our office.

## 2019-07-30 ENCOUNTER — OFFICE VISIT (OUTPATIENT)
Dept: ENDOCRINOLOGY | Facility: MEDICAL CENTER | Age: 79
End: 2019-07-30
Payer: COMMERCIAL

## 2019-07-30 VITALS
DIASTOLIC BLOOD PRESSURE: 64 MMHG | HEIGHT: 62 IN | SYSTOLIC BLOOD PRESSURE: 120 MMHG | BODY MASS INDEX: 20.24 KG/M2 | HEART RATE: 79 BPM | WEIGHT: 110 LBS | OXYGEN SATURATION: 94 %

## 2019-07-30 DIAGNOSIS — E53.8 VITAMIN B 12 DEFICIENCY: ICD-10-CM

## 2019-07-30 DIAGNOSIS — M81.0 AGE RELATED OSTEOPOROSIS, UNSPECIFIED PATHOLOGICAL FRACTURE PRESENCE: ICD-10-CM

## 2019-07-30 DIAGNOSIS — E55.9 VITAMIN D DEFICIENCY: ICD-10-CM

## 2019-07-30 PROCEDURE — 99214 OFFICE O/P EST MOD 30 MIN: CPT | Performed by: INTERNAL MEDICINE

## 2019-07-30 RX ORDER — PHENOL 1.4 %
AEROSOL, SPRAY (ML) MUCOUS MEMBRANE
COMMUNITY
End: 2020-11-04

## 2019-08-07 ENCOUNTER — HOSPITAL ENCOUNTER (OUTPATIENT)
Dept: LAB | Facility: MEDICAL CENTER | Age: 79
End: 2019-08-07
Attending: OBSTETRICS & GYNECOLOGY
Payer: COMMERCIAL

## 2019-08-07 PROCEDURE — 87624 HPV HI-RISK TYP POOLED RSLT: CPT

## 2019-08-07 PROCEDURE — 88175 CYTOPATH C/V AUTO FLUID REDO: CPT

## 2019-08-09 LAB
CYTOLOGY REG CYTOL: NORMAL
HPV HR 12 DNA CVX QL NAA+PROBE: NEGATIVE
HPV16 DNA SPEC QL NAA+PROBE: NEGATIVE
HPV18 DNA SPEC QL NAA+PROBE: NEGATIVE
SPECIMEN SOURCE: NORMAL

## 2019-09-06 ENCOUNTER — HOSPITAL ENCOUNTER (OUTPATIENT)
Dept: RADIOLOGY | Facility: MEDICAL CENTER | Age: 79
End: 2019-09-06
Attending: INTERNAL MEDICINE
Payer: COMMERCIAL

## 2019-09-06 ENCOUNTER — HOSPITAL ENCOUNTER (OUTPATIENT)
Dept: LAB | Facility: MEDICAL CENTER | Age: 79
End: 2019-09-06
Attending: INTERNAL MEDICINE
Payer: COMMERCIAL

## 2019-09-06 DIAGNOSIS — M25.572 ACUTE LEFT ANKLE PAIN: ICD-10-CM

## 2019-09-06 DIAGNOSIS — M25.571 ACUTE RIGHT ANKLE PAIN: ICD-10-CM

## 2019-09-06 LAB
ALBUMIN SERPL BCP-MCNC: 3.9 G/DL (ref 3.2–4.9)
ALBUMIN/GLOB SERPL: 1.3 G/DL
ALP SERPL-CCNC: 74 U/L (ref 30–99)
ALT SERPL-CCNC: 12 U/L (ref 2–50)
ANION GAP SERPL CALC-SCNC: 8 MMOL/L (ref 0–11.9)
APPEARANCE UR: CLEAR
AST SERPL-CCNC: 16 U/L (ref 12–45)
BASOPHILS # BLD AUTO: 0.9 % (ref 0–1.8)
BASOPHILS # BLD: 0.04 K/UL (ref 0–0.12)
BILIRUB SERPL-MCNC: 0.6 MG/DL (ref 0.1–1.5)
BILIRUB UR QL STRIP.AUTO: NEGATIVE
BUN SERPL-MCNC: 22 MG/DL (ref 8–22)
CALCIUM SERPL-MCNC: 9.5 MG/DL (ref 8.5–10.5)
CHLORIDE SERPL-SCNC: 104 MMOL/L (ref 96–112)
CO2 SERPL-SCNC: 29 MMOL/L (ref 20–33)
COLOR UR: YELLOW
CREAT SERPL-MCNC: 0.69 MG/DL (ref 0.5–1.4)
EOSINOPHIL # BLD AUTO: 0.09 K/UL (ref 0–0.51)
EOSINOPHIL NFR BLD: 1.9 % (ref 0–6.9)
ERYTHROCYTE [DISTWIDTH] IN BLOOD BY AUTOMATED COUNT: 43.9 FL (ref 35.9–50)
ERYTHROCYTE [SEDIMENTATION RATE] IN BLOOD BY WESTERGREN METHOD: 5 MM/HOUR (ref 0–30)
FOLATE SERPL-MCNC: >22.4 NG/ML
GLOBULIN SER CALC-MCNC: 3 G/DL (ref 1.9–3.5)
GLUCOSE SERPL-MCNC: 99 MG/DL (ref 65–99)
GLUCOSE UR STRIP.AUTO-MCNC: NEGATIVE MG/DL
HCT VFR BLD AUTO: 43.5 % (ref 37–47)
HGB BLD-MCNC: 14.4 G/DL (ref 12–16)
IMM GRANULOCYTES # BLD AUTO: 0.01 K/UL (ref 0–0.11)
IMM GRANULOCYTES NFR BLD AUTO: 0.2 % (ref 0–0.9)
KETONES UR STRIP.AUTO-MCNC: NEGATIVE MG/DL
LEUKOCYTE ESTERASE UR QL STRIP.AUTO: NEGATIVE
LYMPHOCYTES # BLD AUTO: 0.91 K/UL (ref 1–4.8)
LYMPHOCYTES NFR BLD: 19.5 % (ref 22–41)
MCH RBC QN AUTO: 33 PG (ref 27–33)
MCHC RBC AUTO-ENTMCNC: 33.1 G/DL (ref 33.6–35)
MCV RBC AUTO: 99.8 FL (ref 81.4–97.8)
MICRO URNS: NORMAL
MONOCYTES # BLD AUTO: 0.44 K/UL (ref 0–0.85)
MONOCYTES NFR BLD AUTO: 9.4 % (ref 0–13.4)
NEUTROPHILS # BLD AUTO: 3.17 K/UL (ref 2–7.15)
NEUTROPHILS NFR BLD: 68.1 % (ref 44–72)
NITRITE UR QL STRIP.AUTO: NEGATIVE
NRBC # BLD AUTO: 0 K/UL
NRBC BLD-RTO: 0 /100 WBC
PH UR STRIP.AUTO: 8 [PH] (ref 5–8)
PLATELET # BLD AUTO: 312 K/UL (ref 164–446)
PMV BLD AUTO: 10.5 FL (ref 9–12.9)
POTASSIUM SERPL-SCNC: 4.7 MMOL/L (ref 3.6–5.5)
PROT SERPL-MCNC: 6.9 G/DL (ref 6–8.2)
PROT UR QL STRIP: NEGATIVE MG/DL
RBC # BLD AUTO: 4.36 M/UL (ref 4.2–5.4)
RBC UR QL AUTO: NEGATIVE
RHEUMATOID FACT SER IA-ACNC: <10 IU/ML (ref 0–14)
SODIUM SERPL-SCNC: 141 MMOL/L (ref 135–145)
SP GR UR STRIP.AUTO: 1.01
TSH SERPL DL<=0.005 MIU/L-ACNC: 3.05 UIU/ML (ref 0.38–5.33)
UROBILINOGEN UR STRIP.AUTO-MCNC: 0.2 MG/DL
VIT B12 SERPL-MCNC: >1500 PG/ML (ref 211–911)
WBC # BLD AUTO: 4.7 K/UL (ref 4.8–10.8)

## 2019-09-06 PROCEDURE — 81003 URINALYSIS AUTO W/O SCOPE: CPT

## 2019-09-06 PROCEDURE — 86431 RHEUMATOID FACTOR QUANT: CPT

## 2019-09-06 PROCEDURE — 73610 X-RAY EXAM OF ANKLE: CPT | Mod: LT

## 2019-09-06 PROCEDURE — 85652 RBC SED RATE AUTOMATED: CPT

## 2019-09-06 PROCEDURE — 82746 ASSAY OF FOLIC ACID SERUM: CPT

## 2019-09-06 PROCEDURE — 86038 ANTINUCLEAR ANTIBODIES: CPT

## 2019-09-06 PROCEDURE — 84443 ASSAY THYROID STIM HORMONE: CPT

## 2019-09-06 PROCEDURE — 83036 HEMOGLOBIN GLYCOSYLATED A1C: CPT

## 2019-09-06 PROCEDURE — 36415 COLL VENOUS BLD VENIPUNCTURE: CPT

## 2019-09-06 PROCEDURE — 82607 VITAMIN B-12: CPT

## 2019-09-06 PROCEDURE — 80053 COMPREHEN METABOLIC PANEL: CPT

## 2019-09-06 PROCEDURE — 85025 COMPLETE CBC W/AUTO DIFF WBC: CPT

## 2019-09-07 LAB
EST. AVERAGE GLUCOSE BLD GHB EST-MCNC: 117 MG/DL
HBA1C MFR BLD: 5.7 % (ref 0–5.6)
NUCLEAR IGG SER QL IA: NORMAL

## 2019-10-28 ENCOUNTER — OFFICE VISIT (OUTPATIENT)
Dept: NEUROLOGY | Facility: MEDICAL CENTER | Age: 79
End: 2019-10-28
Payer: COMMERCIAL

## 2019-10-28 ENCOUNTER — HOSPITAL ENCOUNTER (OUTPATIENT)
Dept: LAB | Facility: MEDICAL CENTER | Age: 79
End: 2019-10-28
Attending: INTERNAL MEDICINE
Payer: COMMERCIAL

## 2019-10-28 VITALS
DIASTOLIC BLOOD PRESSURE: 70 MMHG | HEIGHT: 63 IN | HEART RATE: 80 BPM | BODY MASS INDEX: 19.49 KG/M2 | WEIGHT: 110 LBS | SYSTOLIC BLOOD PRESSURE: 114 MMHG

## 2019-10-28 DIAGNOSIS — E53.8 VITAMIN B 12 DEFICIENCY: ICD-10-CM

## 2019-10-28 DIAGNOSIS — E55.9 VITAMIN D DEFICIENCY: ICD-10-CM

## 2019-10-28 DIAGNOSIS — M81.0 AGE RELATED OSTEOPOROSIS, UNSPECIFIED PATHOLOGICAL FRACTURE PRESENCE: ICD-10-CM

## 2019-10-28 LAB
25(OH)D3 SERPL-MCNC: 41 NG/ML (ref 30–100)
ALBUMIN SERPL BCP-MCNC: 4 G/DL (ref 3.2–4.9)
ANION GAP SERPL CALC-SCNC: 8 MMOL/L (ref 0–11.9)
BUN SERPL-MCNC: 20 MG/DL (ref 8–22)
CA-I SERPL-SCNC: 1.3 MMOL/L (ref 1.1–1.3)
CALCIUM SERPL-MCNC: 9.5 MG/DL (ref 8.5–10.5)
CHLORIDE SERPL-SCNC: 104 MMOL/L (ref 96–112)
CO2 SERPL-SCNC: 28 MMOL/L (ref 20–33)
CREAT SERPL-MCNC: 0.75 MG/DL (ref 0.5–1.4)
GLUCOSE SERPL-MCNC: 90 MG/DL (ref 65–99)
PHOSPHATE SERPL-MCNC: 3.2 MG/DL (ref 2.5–4.5)
POTASSIUM SERPL-SCNC: 4.3 MMOL/L (ref 3.6–5.5)
PTH-INTACT SERPL-MCNC: 12.5 PG/ML (ref 14–72)
SODIUM SERPL-SCNC: 140 MMOL/L (ref 135–145)
VIT B12 SERPL-MCNC: 840 PG/ML (ref 211–911)

## 2019-10-28 PROCEDURE — 82040 ASSAY OF SERUM ALBUMIN: CPT

## 2019-10-28 PROCEDURE — 84100 ASSAY OF PHOSPHORUS: CPT

## 2019-10-28 PROCEDURE — 80048 BASIC METABOLIC PNL TOTAL CA: CPT

## 2019-10-28 PROCEDURE — 36415 COLL VENOUS BLD VENIPUNCTURE: CPT

## 2019-10-28 PROCEDURE — 82306 VITAMIN D 25 HYDROXY: CPT

## 2019-10-28 PROCEDURE — 99205 OFFICE O/P NEW HI 60 MIN: CPT | Performed by: PSYCHIATRY & NEUROLOGY

## 2019-10-28 PROCEDURE — 82607 VITAMIN B-12: CPT

## 2019-10-28 PROCEDURE — 83970 ASSAY OF PARATHORMONE: CPT

## 2019-10-28 PROCEDURE — 82330 ASSAY OF CALCIUM: CPT

## 2019-10-28 ASSESSMENT — ENCOUNTER SYMPTOMS
TINGLING: 1
SENSORY CHANGE: 1
TREMORS: 0
FOCAL WEAKNESS: 0
FALLS: 0

## 2019-10-29 NOTE — PROGRESS NOTES
Subjective:      Antonio Treviño is a 78 y.o. female who presents for consultation from the office of Dr. Najera, with a history of slowly progressive bilateral lower extremity paresthesias over the last couple of years.    HPI    Ms. Treviño is a pleasant 78-year-old right-handed  American female whose back pain problems started a couple of years ago, MRI scan from 2014 was reviewed, revealing only an L4/5 level of a moderate degenerative change and central stenosis.  Multiple milder degenerative changes at seen at other levels.  She then suffered from a rather severe lifting injury resulting in localized right lower back pain without distal radiation or associated sensory distortions.  MRI scan from January, 2016 revealed the same degenerative changes but now with evidence of an HNP at the L3/4 level with moderate central stenosis, some contact of disc fragment at the L3 nerve root on the right, but otherwise no significant change.    Clinically, with aggressive conservative treatments and what sounds like was epidural steroid injections, the symptoms resolved completely within a matter of several months.    Over time paresthesias began to develop in the right lower extremity, this was something new.  There was no singular or inciting event.  She simply noted numbness and tingling that started in the right foot and eventually ascended, and are now at the level of the knee.  Eventually the left lower extremity became involved in similar fashion.  The symptoms were always episodic, always worsening with weightbearing and walking, though they would initially resolve once she got off her feet.  There was never low back pain or actual pain in the lower extremities in association with the sensory distortions.  In time the sensory distortions became permanent, still fluctuating depending on degree and time weightbearing.  She describes a subjective distortion on the bottoms of both feet, there is occasional numbness.   She has not been injuring her feet.    She denies any kind of weakness, changes in bowel or bladder function, low back pain with sciatica radiating symptoms, constrictive sensations around the midriff or perineum, no loss of sensation in the perianal and perigenital regions or changes in bowel or bladder function.  What does happen is with Valsalva, this will increase the paresthesias in both lower extremities.    When symptoms first started and were in the right leg only, EMG/NCV studies were ordered, evidently these were unremarkable.  MRI scan was repeated and another facility, I do not have these imaging studies or the report to review, she was told that it showed problems in the back.  Vascular studies were also done to rule out PVD, these prove normal.    She has been treated with anti-inflammatories and then gabapentin, none of which have provided any meaningful benefit.    Other than osteoporosis, she has vitiligo, but there is no history of thyroid disease, diabetes, malignancy, blood dyscrasia, liver or kidney disease, autoimmune disease, MS, seizure, neuro degenerative disease, PVD, CAD, CVA, hypertension, dyslipidemia, or NATALIE.  There is no surgical history of note from my standpoint.    In the family, no one has a history of neuropathy or progressive lower extremity weakness, she does know very little of her mother who  at a very young age.  Her 6 siblings and both her sons are all alive and well.    She neither smokes nor drinks, there is no history of MSA or HIV risk.  She is a retired nurse anesthetist.  She is on Forteo, vitamin D and calcium supplements only.    Review of Systems   Constitutional: Positive for malaise/fatigue.   Musculoskeletal: Negative for falls.   Neurological: Positive for tingling and sensory change. Negative for tremors and focal weakness.   All other systems reviewed and are negative.       Objective:     /70 (BP Location: Left arm, Patient Position: Sitting, BP  "Cuff Size: Adult)   Pulse 80   Ht 1.6 m (5' 3\")   Wt 49.9 kg (110 lb)   BMI 19.49 kg/m²      Physical Exam    She appears in no acute distress.  Vital signs are stable.  There is no malar rash.  The neck is supple, range of motion is full, Lhermitte's phenomena is absent, compression maneuvers are negative.  Straight leg raising is negative bilaterally.  Distal pulses are intact bilaterally.  There is no evidence of vascular insufficiency changes seen in the lower extremities.  There is some minimal pretibial edema only.  There is no sciatic notch or sacroiliac tenderness, axial loading and truncal rotation are absent for increasing symptoms in the lower extremity's.    She is fully oriented, there is no aphasia, agnosia, apraxia, or inattention.    PERRLA/EOMI, visual fields are full, funduscopic exam is benign, facial movements are symmetric, sensory exam is intact light touch and temperature, the tongue and uvula are midline, there is no bulbar dysfunction, shoulder shrug and head rotation are intact and symmetric.    Musculoskeletal exam reveals normal tone throughout, there is no tremor, asterixis, or drift.  Strength is 5/5 in all muscle groups in all 4 extremities, reflexes are symmetric, easily elicited throughout, the ankle jerks are present but they are clearly diminished bilaterally, both toes are downgoing.    Repetitive movements and fine motor control are intact and symmetric in all 4 extremities.  She stands easily, arm swing is symmetric, stride length is diminished and she walks cautiously though she does not look at the ground to maintain balance.  She is almost apractic when she walks, flat-footed, with a steppage quality.  There is no appendicular dystaxia with any of the extremities.    Sensory exam is exquisitely intact to temperature, pinprick and vibration.  Romberg is absent.     Assessment/Plan:     1. Paresthesias/numbness  Her exam is benign, but I am a little concerned about the " claudication-like picture with increasing symptoms bilaterally with weightbearing as well as on Valsalva.  Repeat imaging of the lumbar spine does need to be done since the symptoms have continued to progress, I am not sure what the EMG/NCV studies had shown but these were done right after symptoms reemerged a couple of years ago, these 2 must be repeated.  We need to better clarify whether or not this is purely a back problem or does she also have a small fiber sensory polyneuropathy.  She has nothing to suggest weakness or a demyelinating process.  She is not overtly myelopathic so I do not think imaging of the neck is indicated.    She was told she can remain active but that her body will tell her when enough is enough.  We talked about signs and symptoms suggestive of a more critical process such as loss of bowel and bladder control, actual weakness, increasing back pain with radiating symptoms, persistent sensory loss, postural instability, etc.  In that circumstance she needs to go to an emergency room or call the office and get some instructions.  The absence of pain and tenderness on exam are probably why anti-inflammatories will provide little benefit.  There is not enough discomfort that would warrant a membrane stabilizing drug, she has already failed gabapentin, she would like to hold on another trial.    MRI of the lumbar spine and EMG/NCV studies of the lower extremities and paraspinal muscle bodies will be obtained.  We will call her with results earlier, otherwise we talk specifics at her next office follow-up.  She will call the office in the interim if needed, based on the above.    - MR-LUMBAR SPINE-W/O; Future  - REFERRAL TO NEURODIAGNOSTICS (EEG,EP,EMG/NCS/DBS) Modality Requested: EMG/NCS-Comment Extremities

## 2019-10-31 ENCOUNTER — NON-PROVIDER VISIT (OUTPATIENT)
Dept: NEUROLOGY | Facility: MEDICAL CENTER | Age: 79
End: 2019-10-31
Payer: COMMERCIAL

## 2019-10-31 PROCEDURE — 95886 MUSC TEST DONE W/N TEST COMP: CPT | Performed by: PSYCHIATRY & NEUROLOGY

## 2019-10-31 PROCEDURE — 95909 NRV CNDJ TST 5-6 STUDIES: CPT | Performed by: PSYCHIATRY & NEUROLOGY

## 2019-10-31 NOTE — PROCEDURES
"NERVE CONDUCTION STUDIES AND ELECTROMYOGRAPHY REPORT  [facility]  10/31/19           IMPRESSION:  This is an abnormal electrodiagnostic study due to ACTIVE denervation of the LEFT L3-5 lumbar paraspinals in the setting of chronic inactive reinnervation of the bilateral vastus lateralis.  This is most consistent with a left L3/4 lumbosacral radiculopathy, however due to the bilateral involvement of the vastus lateralis, an L3/4 lumbar stenosis should also be considered.  There is no evidence of peripheral neuropathy in the bilateral lower extremities.  Recommend clinical correlation.        Magdalena Quintero MD  Neurology - Neurophysiology  [facility]        REASON FOR REFERRAL:  Ms. Antonio JARAMILLO Treviño 78 y.o. referred by Dr. Renaldo Welch for evaluation of gradually progressive numbness and paresthesias starting in the right lower extremity with gradual progression to the left lower extremity.  She has a known history of degenerative disc disease of the lumbar spine with some central canal narrowing.  There is concern for spinal stenosis and claudication.    Height: 5'2\"  Weight: 110 lbs      ELECTRODIAGNOSTIC EXAMINATION:  Nerve conduction studies (NCS) and electromyography (EMG) are utilized to evaluate direct or indirect damage to the peripheral nervous system. NCS are performed to measure the nerve(s) response(s) to electrostimulation across a given nerve segment. EMG evaluates the passive and active electrical activity of the muscle(s) in question.  Muscles are innervated by specific peripheral nerves and roots. Often times, several nerves the muscle to be examined in order to determine the presence or absence of the disease process. Furthermore, nerves and muscles may need to be tested in a [id number] comparison, as well as in additional extremities, as this may be crucial in characterizing the extent of the disease process, which may be diffuse or isolated and of varying degree of " severity. The extent of the neurodiagnostic exam is justified as it may help arrive to a proper diagnosis, which ultimately may contribute to better management of the patient. Therefore, the nerves to muscles examined during the study were medically necessary.    Unless otherwise noted, temperature of the extremity(s) study was monitored before and during the examination and remained between 32 and 36 degrees C for the upper extremities, and between 30 and 36 degrees C for the lower extremities. The patient tolerated testing well, without any complications.       NERVE CONDUCTION STUDY SUMMARY:  Selected nerves of the bilateral lower extremity are studied.    Normal bilateral sural sensory responses.  Normal bilateral common peroneal motor responses at the extensor digitorum brevis.  Bilateral F waves are unobtainable.  Normal right tibial motor response at the abductor hallucis brevis.  F-wave within normal limits.  Normal left tibial motor response at the abductor hallucis brevis.  There is mild slowing of uncertain significance or etiology.  F-wave within normal limits.    NEEDLE EMG SUMMARY:  Concentric needle study of selected bilateral lower extremity and lumbar paraspinal muscles is performed.     Insertion activity is increased in the left lumbar paraspinals from L3-L5 due to presence of fibrillation potentials and positive waves.  Insertion activity is normal in the right lumbar paraspinals and remaining muscles.  Large amplitude prolonged duration motor unit potentials are present in the bilateral vastus lateralis with normal recruitment.  Otherwise with activation, there are normal morphology (amplitude/duration) motor unit action potentials firing with normal recruitment and remaining muscles tested.       PATIENT DATA TABLES  Nerve Conduction Studies     Stim Site NR Onset (ms) Norm Onset (ms) O-P Amp (µV) Norm O-P Amp Site1 Site2 Delta-P (ms) Dist (cm) Andrae (m/s) Norm Andrae (m/s)   Left Sural Anti Sensory  (Lat Mall)  35°C   Calf    2.9 <4.6 12.7 >3 Calf Lat Mall 3.8 14.0 *37 >40   Right Sural Anti Sensory (Lat Mall)  35°C   Calf    3.2 <4.6 7.0 >3 Calf Lat Mall 4.1 14.0 *34 >40        Stim Site NR Onset (ms) Norm Onset (ms) O-P Amp (mV) Norm O-P Amp Site1 Site2 Delta-0 (ms) Dist (cm) Andrae (m/s) Norm Andrae (m/s)   Left Peroneal EDB Motor (Ext Dig Brev)  35°C   Ankle    3.8 <6 3.6 >2.5 B Fib Ankle 5.3 28.0 53 >40   B Fib    9.1  3.5  Poplt B Fib 1.7 10.0 59    Poplt    10.8  3.4          Right Peroneal EDB Motor (Ext Dig Brev)  35°C   Ankle    4.0 <6 3.7 >2.5 B Fib Ankle 5.5 27.0 49 >40   B Fib    9.5  3.6  Poplt B Fib 2.0 10.0 50    Poplt    11.5  3.6          Left Tibial Motor (Abd Yu Brev)  35°C   Ankle    5.2 <6 11.6 >4 Knee Ankle 8.6 32.0 *37 >40   Knee    13.8  9.6          Right Tibial Motor (Abd Yu Brev)  35°C   Ankle    5.5 <6 16.2 >4 Knee Ankle 6.4 34.0 53 >40   Knee    11.9  13.7            F Wave Studies     NR F-Lat (ms) Lat Norm (ms)   Left Peroneal EDB (Ext Dig Brev)  35°C   *NR  <57   Right Peroneal EDB (Ext Dig Brev)  35°C   *NR  <57   Left Tibial (Abd Hallucis)  35°C      51.43 <57   Right Tibial (Abd Hallucis)  35°C      52.97 <57                               Electromyography     Side Muscle Nerve Root Ins Act Fibs Psw Amp Dur Poly Recrt Int Pat Comment   Right AntTibialis Dp Br Fibular L4-5 Nml Nml Nml Nml Nml 0 Nml Nml    Right Gastroc Tibial S1-2 Nml Nml Nml Nml Nml 0 Nml Nml    Right VastusLat Femoral L2-4 Nml Nml Nml *Incr *>12ms 0 Nml Nml    Right GluteusMed SupGluteal L5-S1 Nml Nml Nml Nml Nml 0 Nml Nml    Right Lumbo Parasp Low Rami L5-S1 Nml Nml Nml         Left AntTibialis Dp Br Fibular L4-5 Nml Nml Nml Nml Nml 0 Nml Nml    Left Gastroc Tibial S1-2 Nml Nml Nml Nml Nml 0 Nml Nml    Left VastusLat Femoral L2-4 Nml Nml Nml *Incr *>12ms 0 Nml Nml    Left GluteusMed SupGluteal L5-S1 Nml Nml Nml Nml Nml 0 Nml Nml    Left Lumbo Parasp Low Rami L3-L5 *Incr *2+ *2+

## 2019-11-04 NOTE — PROGRESS NOTES
Endocrinology Clinic Progress Note  PCP: Woo Najera M.D.    HPI:  Antonio Treviño is a 78 y.o. old patient who comes in today for review of her age related osteoporosis and vitamin d deficiency.   She is currently on Forteo 20 mcg per day and Vitamin D supplementation of 6000 iu per day.   Most recent vitamin d level on 10/28/19 was 41       ROS:  Constitutional: No weight loss  Cardiac: No palpitations or racing heart  Resp: No shortness of breath  Neuro: No numbness or tinging in feet  Endo: No heat or cold intolerance, no polyuria or polydipsia  All other systems were reviewed and were negative.    Past Medical History:  Patient Active Problem List    Diagnosis Date Noted   • Closed compression fracture of third lumbar vertebra (HCC) 10/07/2016   • Benign paroxysmal positional vertigo due to bilateral vestibular disorder 10/07/2016   • Vitamin D deficiency 04/30/2014   • Systolic murmur 04/29/2013   • Osteopenia 01/29/2013   • HLD (hyperlipidemia) 01/29/2013   • S/P colonoscopy-2011 neg in [place] 01/29/2013       Past Surgical History:  Past Surgical History:   Procedure Laterality Date   • CATARACT PHACO WITH IOL  7/2/2013    Performed by Bin Cardona M.D. at [facility]   • TENDON TRANSFER         Allergies:  Codeine and Morphine    Social History:  Social History     Socioeconomic History   • Marital status:      Spouse name: Not on file   • Number of children: Not on file   • Years of education: Not on file   • Highest education level: Not on file   Occupational History   • Not on file   Social Needs   • Financial resource strain: Not on file   • Food insecurity:     Worry: Not on file     Inability: Not on file   • Transportation needs:     Medical: Not on file     Non-medical: Not on file   Tobacco Use   • Smoking status: Never Smoker   • Smokeless tobacco: Never Used   Substance and Sexual Activity   • Alcohol use: No   • Drug use: No   • Sexual activity: Not on file  "  Lifestyle   • Physical activity:     Days per week: Not on file     Minutes per session: Not on file   • Stress: Not on file   Relationships   • Social connections:     Talks on phone: Not on file     Gets together: Not on file     Attends Hindu service: Not on file     Active member of club or organization: Not on file     Attends meetings of clubs or organizations: Not on file     Relationship status: Not on file   • Intimate partner violence:     Fear of current or ex partner: Not on file     Emotionally abused: Not on file     Physically abused: Not on file     Forced sexual activity: Not on file   Other Topics Concern   • Not on file   Social History Narrative   • Not on file       Family History:  History reviewed. No pertinent family history.    Medications:    Current Outpatient Medications:   •  Cholecalciferol (VITAMIN D HIGH POTENCY PO), Take 6,000 Units by mouth every day., Disp: , Rfl:   •  Calcium Carbonate (CALCIUM 600) 600 MG Tab, Take  by mouth., Disp: , Rfl:   •  Teriparatide, Recombinant, (FORTEO) 600 MCG/2.4ML Solution, Inject 20 mcg as instructed every day., Disp: 1 PEN, Rfl: 11    Labs: Reviewed    Physical Examination:  Vital signs: /72 (BP Location: Left arm, Patient Position: Sitting)   Pulse 72   Ht 1.6 m (5' 3\")   Wt 50.6 kg (111 lb 9.6 oz)   SpO2 93%   BMI 19.77 kg/m²  Body mass index is 19.77 kg/m².  General: No apparent distress, cooperative  Eyes: No scleral icterus or discharge  ENMT: Normal on external inspection of nose, lips, normal thyroid exam  Neck: No abnormal masses on inspection  Resp: Normal effort, clear to auscultation bilaterally   CVS: Regular rate and rhythm, S1 S2 normal, no murmur   Extremities: No edema  Abdomen: mild abdominal obesity present  Neuro: Alert and oriented  Skin: No rash  Psych: Normal mood and affect, intact memory and able to make informed decisions    Assessment and Plan:  1. Age related osteoporosis, unspecified pathological " fracture presence  tolerating Forteo therapy well.  Continue to have optimal vitamin D.    2. Vitamin D deficiency  Recommend vitamin D supplementation 8000 to 10,000 units daily with food to target optimal vitamin D levels between 50-80 range.    Return in about 6 months (around 5/6/2020).    Thank you for allowing me to participate in the care of this patient.    Efra Mckeon M.D.  11/04/19    CC:   Woo Najera M.D.    This note was created using voice recognition software (Dragon). The accuracy of the dictation is limited by the abilities of the software. I have reviewed the note prior to signing, however some errors in grammar and context are still possible. If you have any questions related to this note please do not hesitate to contact our office.   This note was scribed by Lydia Rincon RN, CDE

## 2019-11-06 ENCOUNTER — OFFICE VISIT (OUTPATIENT)
Dept: ENDOCRINOLOGY | Facility: MEDICAL CENTER | Age: 79
End: 2019-11-06
Payer: COMMERCIAL

## 2019-11-06 VITALS
BODY MASS INDEX: 19.77 KG/M2 | SYSTOLIC BLOOD PRESSURE: 116 MMHG | WEIGHT: 111.6 LBS | HEIGHT: 63 IN | HEART RATE: 72 BPM | OXYGEN SATURATION: 93 % | DIASTOLIC BLOOD PRESSURE: 72 MMHG

## 2019-11-06 DIAGNOSIS — E55.9 VITAMIN D DEFICIENCY: ICD-10-CM

## 2019-11-06 DIAGNOSIS — E03.9 HYPOTHYROIDISM, UNSPECIFIED TYPE: ICD-10-CM

## 2019-11-06 DIAGNOSIS — E53.8 VITAMIN B 12 DEFICIENCY: ICD-10-CM

## 2019-11-06 DIAGNOSIS — M81.0 AGE RELATED OSTEOPOROSIS, UNSPECIFIED PATHOLOGICAL FRACTURE PRESENCE: ICD-10-CM

## 2019-11-06 PROCEDURE — 99214 OFFICE O/P EST MOD 30 MIN: CPT | Performed by: INTERNAL MEDICINE

## 2019-11-07 ENCOUNTER — HOSPITAL ENCOUNTER (OUTPATIENT)
Dept: RADIOLOGY | Facility: MEDICAL CENTER | Age: 79
End: 2019-11-07
Attending: PSYCHIATRY & NEUROLOGY
Payer: COMMERCIAL

## 2019-11-07 PROCEDURE — 72148 MRI LUMBAR SPINE W/O DYE: CPT

## 2020-01-14 ENCOUNTER — OFFICE VISIT (OUTPATIENT)
Dept: NEUROLOGY | Facility: MEDICAL CENTER | Age: 80
End: 2020-01-14
Payer: COMMERCIAL

## 2020-01-14 VITALS
SYSTOLIC BLOOD PRESSURE: 110 MMHG | WEIGHT: 110 LBS | OXYGEN SATURATION: 97 % | HEART RATE: 78 BPM | BODY MASS INDEX: 19.49 KG/M2 | DIASTOLIC BLOOD PRESSURE: 60 MMHG | HEIGHT: 63 IN

## 2020-01-14 DIAGNOSIS — M54.16 BILATERAL LUMBAR RADICULOPATHY: Primary | ICD-10-CM

## 2020-01-14 PROCEDURE — 99213 OFFICE O/P EST LOW 20 MIN: CPT | Performed by: PSYCHIATRY & NEUROLOGY

## 2020-01-14 ASSESSMENT — ENCOUNTER SYMPTOMS
MEMORY LOSS: 0
TINGLING: 1
SENSORY CHANGE: 1
FOCAL WEAKNESS: 0
FALLS: 0

## 2020-01-15 NOTE — PROGRESS NOTES
Subjective:      Antonio Treviño is a 79 y.o. female who presents for follow-up, with a history of bilateral lower extremity paresthesias with a claudicating type picture, now status post EMG and MRI scans being done.     HPI    Patient states that the symptoms in the lower extremity seem to have gotten a little bit worse.  She still has the constant numbness in both feet below the ankles, but the radiation proximally has been getting worse, and seems to be occurring with shorter and shorter distances after weightbearing.  They are still more noticeable on the right than on the left, but they are becoming more symmetric.  Though she feels unsteady because of the sensory distortion, she denies actual weakness with foot drop, frequent tripping, etc.  She has not been falling.  Bowel and bladder function have been maintained.  She denies constrictive sensations around the lower abdomen, or perianal and perigenital numbness.    MRI of the lumbar spine revealed no evidence of significant central stenosis, but multilevel mild to moderate spinal stenosis at L3/4, L1/2 and L4/5.  At these levels there was also some lateral foraminal narrowing, or mild to moderate in severity.  EMG/NCV studies revealed evidence of chronic and also superimposed active denervation on the left at L 3/4, most prominent on the left, but with bilaterality, the possibility of stenosis was raised.  There is no evidence of a peripheral polyneuropathy.    Medical, surgical and family histories are reviewed in the electronic health record, there are no new drug allergies.  She is presently on no medications.    Review of Systems   Genitourinary: Negative for frequency and urgency.   Musculoskeletal: Negative for falls.   Neurological: Positive for tingling and sensory change. Negative for focal weakness.   Psychiatric/Behavioral: Negative for memory loss.   All other systems reviewed and are negative.       Objective:     /60 (BP Location: Left  "arm, Patient Position: Sitting, BP Cuff Size: Adult)   Pulse 78   Ht 1.6 m (5' 3\")   Wt 49.9 kg (110 lb)   SpO2 97%   BMI 19.49 kg/m²      Physical Exam    She appears in no acute distress.  Her vital signs are stable.  There is no malar rash.  The neck is supple.  Cardiac evaluation is unremarkable.  Compression maneuvers of the cervical spine is negative.  Straight leg raising is negative bilaterally.    Mental status and cranial nerve exams remain fully intact.  Musculoskeletal exam in the upper extremities is intact.  In the lower extremities there is no atrophy, strength is still intact throughout, including distal foot musculatures.  Reflexes are still symmetric and elicited without issue, though the ankle jerks are diminished.  Both toes are downgoing.  Repetitive movements with the feet are intact.  She stands easily, station is slightly widened, but she does not appear unstable.  Still, there is an apractic quality to her walking.  Sensory exam is grossly intact.     Assessment/Plan:     1. Bilateral lumbar radiculopathy  The history suggests a dynamic process, EMG consistent with the bilateral involvement at the L3/4 level most noticeable on the left, though her symptoms have always been more pronounced on the right.  MRI does show a moderate degree of central stenosis at several levels, but it is a static imaging study is very insensitive when it comes to a claudicating-like picture.  I think a neurosurgical evaluation is appropriate, I will be contacting Marcellus Land MD for his input.  The patient would like to pursue this, she feels like her quality of life is being more curtailed and she is concerned about losing function.  Without pain I do not think that oral steroids are really going to provide much benefit at this time.  Fortunately, her examination has not changed drastically over the last several months.    - REFERRAL TO NEUROSURGERY    Time: 20-minute spent face-to-face for exam, review, " discussion, and education, of this over 50% of the time spent counseling and coordinating care.

## 2020-02-13 ENCOUNTER — HOSPITAL ENCOUNTER (OUTPATIENT)
Dept: RADIOLOGY | Facility: MEDICAL CENTER | Age: 80
End: 2020-02-13
Attending: INTERNAL MEDICINE
Payer: COMMERCIAL

## 2020-02-13 DIAGNOSIS — Z01.818 OTHER SPECIFIED PRE-OPERATIVE EXAMINATION: ICD-10-CM

## 2020-02-13 PROCEDURE — 71045 X-RAY EXAM CHEST 1 VIEW: CPT

## 2020-03-13 ENCOUNTER — HOSPITAL ENCOUNTER (OUTPATIENT)
Dept: RADIOLOGY | Facility: MEDICAL CENTER | Age: 80
End: 2020-03-13
Attending: NEUROLOGICAL SURGERY
Payer: COMMERCIAL

## 2020-03-13 DIAGNOSIS — M48.061 SPINAL STENOSIS, LUMBAR REGION, WITHOUT NEUROGENIC CLAUDICATION: ICD-10-CM

## 2020-03-13 PROCEDURE — 72110 X-RAY EXAM L-2 SPINE 4/>VWS: CPT

## 2020-06-15 DIAGNOSIS — M80.80XD LOCALIZED OSTEOPOROSIS WITH CURRENT PATHOLOGICAL FRACTURE WITH ROUTINE HEALING, SUBSEQUENT ENCOUNTER: ICD-10-CM

## 2020-06-15 NOTE — TELEPHONE ENCOUNTER
Received request via: Pharmacy    Was the patient seen in the last year in this department? Yes    Does the patient have an active prescription (recently filled or refills available) for medication(s) requested? No         FORTEO 600 MCG/2.4ML Solution         Sig: INJECT 20 MCG UNDER THE SKIN EVERY DAY. DISCARD PEN 28 DAYS AFTER FIRST INITIAL USE

## 2020-07-23 ENCOUNTER — HOSPITAL ENCOUNTER (OUTPATIENT)
Dept: RADIOLOGY | Facility: MEDICAL CENTER | Age: 80
End: 2020-07-23
Attending: NURSE PRACTITIONER
Payer: COMMERCIAL

## 2020-07-23 DIAGNOSIS — I99.8 VASCULAR INSUFFICIENCY: ICD-10-CM

## 2020-07-23 PROCEDURE — 93970 EXTREMITY STUDY: CPT

## 2020-07-23 PROCEDURE — 93922 UPR/L XTREMITY ART 2 LEVELS: CPT

## 2020-07-25 ENCOUNTER — HOSPITAL ENCOUNTER (OUTPATIENT)
Dept: RADIOLOGY | Facility: MEDICAL CENTER | Age: 80
End: 2020-07-25
Attending: NURSE PRACTITIONER
Payer: COMMERCIAL

## 2020-07-25 DIAGNOSIS — R19.00 PELVIC LUMP: ICD-10-CM

## 2020-07-25 PROCEDURE — 76830 TRANSVAGINAL US NON-OB: CPT

## 2020-08-10 ENCOUNTER — HOSPITAL ENCOUNTER (OUTPATIENT)
Dept: LAB | Facility: MEDICAL CENTER | Age: 80
End: 2020-08-10
Attending: OBSTETRICS & GYNECOLOGY
Payer: COMMERCIAL

## 2020-08-10 LAB
CANCER AG125 SERPL-ACNC: 12.5 U/ML (ref 0–35)
CEA SERPL-MCNC: 1.8 NG/ML (ref 0–3)

## 2020-08-10 PROCEDURE — 86304 IMMUNOASSAY TUMOR CA 125: CPT

## 2020-08-10 PROCEDURE — 36415 COLL VENOUS BLD VENIPUNCTURE: CPT

## 2020-08-10 PROCEDURE — 82105 ALPHA-FETOPROTEIN SERUM: CPT

## 2020-08-10 PROCEDURE — 82378 CARCINOEMBRYONIC ANTIGEN: CPT

## 2020-08-12 LAB — AFP-TM SERPL-MCNC: 5 NG/ML (ref 0–9)

## 2020-08-14 ENCOUNTER — OFFICE VISIT (OUTPATIENT)
Dept: ENDOCRINOLOGY | Facility: MEDICAL CENTER | Age: 80
End: 2020-08-14
Attending: INTERNAL MEDICINE
Payer: COMMERCIAL

## 2020-08-14 VITALS
HEART RATE: 73 BPM | OXYGEN SATURATION: 94 % | SYSTOLIC BLOOD PRESSURE: 114 MMHG | WEIGHT: 111.2 LBS | DIASTOLIC BLOOD PRESSURE: 78 MMHG | BODY MASS INDEX: 19.7 KG/M2 | HEIGHT: 63 IN

## 2020-08-14 DIAGNOSIS — E03.9 HYPOTHYROIDISM, UNSPECIFIED TYPE: ICD-10-CM

## 2020-08-14 DIAGNOSIS — M80.80XD LOCALIZED OSTEOPOROSIS WITH CURRENT PATHOLOGICAL FRACTURE WITH ROUTINE HEALING, SUBSEQUENT ENCOUNTER: ICD-10-CM

## 2020-08-14 DIAGNOSIS — E55.9 VITAMIN D DEFICIENCY: ICD-10-CM

## 2020-08-14 DIAGNOSIS — Z87.81 HISTORY OF VERTEBRAL COMPRESSION FRACTURE: ICD-10-CM

## 2020-08-14 PROCEDURE — 99211 OFF/OP EST MAY X REQ PHY/QHP: CPT | Performed by: INTERNAL MEDICINE

## 2020-08-14 PROCEDURE — 99214 OFFICE O/P EST MOD 30 MIN: CPT | Performed by: INTERNAL MEDICINE

## 2020-08-14 ASSESSMENT — FIBROSIS 4 INDEX: FIB4 SCORE: 1.18430824449154003

## 2020-08-14 NOTE — PROGRESS NOTES
Endocrinology Clinic Progress Note  PCP: Woo Najera M.D.    HPI:  Antonio Treviño is a 78 y.o. old patient who comes in today for review of her age related osteoporosis and vitamin d deficiency.   She is currently on Forteo 20 mcg per day and Vitamin D supplementation of 6000 iu per day.   Most recent vitamin d level on 10/28/19 was 41.   She feels fine overall and denies complaints. Feels like pressure/heaviness in hips and legs sometimes which is expected with forteo.    ROS:  Constitutional: No weight loss  Cardiac: No palpitations or racing heart  Resp: No shortness of breath  Neuro: No numbness or tinging in feet  Endo: No heat or cold intolerance, no polyuria or polydipsia  All other systems were reviewed and were negative.    Past Medical History:  Patient Active Problem List    Diagnosis Date Noted   • Bilateral lumbar radiculopathy 01/14/2020   • Closed compression fracture of third lumbar vertebra (HCC) 10/07/2016   • Benign paroxysmal positional vertigo due to bilateral vestibular disorder 10/07/2016   • Vitamin D deficiency 04/30/2014   • Systolic murmur 04/29/2013   • Osteopenia 01/29/2013   • HLD (hyperlipidemia) 01/29/2013   • S/P colonoscopy-2011 neg in [place] 01/29/2013       Past Surgical History:  Past Surgical History:   Procedure Laterality Date   • CATARACT PHACO WITH IOL  7/2/2013    Performed by Bin Cardona M.D. at [facility]   • TENDON TRANSFER         Allergies:  Codeine and Morphine    Social History:  Social History     Socioeconomic History   • Marital status:      Spouse name: Not on file   • Number of children: Not on file   • Years of education: Not on file   • Highest education level: Not on file   Occupational History   • Not on file   Social Needs   • Financial resource strain: Not on file   • Food insecurity     Worry: Not on file     Inability: Not on file   • Transportation needs     Medical: Not on file     Non-medical: Not on file   Tobacco Use  "  • Smoking status: Never Smoker   • Smokeless tobacco: Never Used   Substance and Sexual Activity   • Alcohol use: No   • Drug use: No   • Sexual activity: Not on file   Lifestyle   • Physical activity     Days per week: Not on file     Minutes per session: Not on file   • Stress: Not on file   Relationships   • Social connections     Talks on phone: Not on file     Gets together: Not on file     Attends Hinduism service: Not on file     Active member of club or organization: Not on file     Attends meetings of clubs or organizations: Not on file     Relationship status: Not on file   • Intimate partner violence     Fear of current or ex partner: Not on file     Emotionally abused: Not on file     Physically abused: Not on file     Forced sexual activity: Not on file   Other Topics Concern   • Not on file   Social History Narrative   • Not on file       Family History:  History reviewed. No pertinent family history.    Medications:    Current Outpatient Medications:   •  FORTEO 600 MCG/2.4ML Solution, INJECT 20 MCG UNDER THE SKIN EVERY DAY. DISCARD PEN 28 DAYS AFTER FIRST INITIAL USE, Disp: 1 PEN, Rfl: 11  •  Cholecalciferol (VITAMIN D HIGH POTENCY PO), Take 6,000 Units by mouth every day., Disp: , Rfl:   •  Calcium Carbonate (CALCIUM 600) 600 MG Tab, Take  by mouth., Disp: , Rfl:     Labs: Reviewed    Physical Examination:  Vital signs: /78 (BP Location: Left arm, Patient Position: Sitting, BP Cuff Size: Adult)   Pulse 73   Ht 1.6 m (5' 3\")   Wt 50.4 kg (111 lb 3.2 oz)   SpO2 94%   BMI 19.70 kg/m²  Body mass index is 19.7 kg/m².  General: No apparent distress, cooperative  Eyes: No scleral icterus or discharge  ENMT: Normal on external inspection of nose, lips, normal thyroid exam  Neck: No abnormal masses on inspection  Resp: Normal effort, clear to auscultation bilaterally   CVS: Regular rate and rhythm, S1 S2 normal, no murmur   Extremities: No edema  Abdomen: mild abdominal obesity present  Neuro: " Alert and oriented  Skin: No rash  Psych: Normal mood and affect, intact memory and able to make informed decisions    Assessment and Plan:  1. Age related osteoporosis, unspecified pathological fracture presence  tolerating Forteo therapy well.  Continue to have optimal vitamin D.  Repeat DEXA scan after may 2021 when she will 2 years of forteo therapy.    2. Vitamin D deficiency  Recommend vitamin D supplementation 8000 to 10,000 units daily with food to target optimal vitamin D levels between 50-80 range.    Return in about 6 months (around 2/14/2021).    Thank you for allowing me to participate in the care of this patient.    Efra Mckeon M.D.  11/04/19    CC:   Woo Najera M.D.    This note was created using voice recognition software (Dragon). The accuracy of the dictation is limited by the abilities of the software. I have reviewed the note prior to signing, however some errors in grammar and context are still possible. If you have any questions related to this note please do not hesitate to contact our office.   This note was scribed by Lydia Rincon RN, CDE

## 2020-08-17 ENCOUNTER — HOSPITAL ENCOUNTER (OUTPATIENT)
Dept: LAB | Facility: MEDICAL CENTER | Age: 80
End: 2020-08-17
Attending: OBSTETRICS & GYNECOLOGY
Payer: COMMERCIAL

## 2020-08-17 LAB — PATHOLOGY CONSULT NOTE: NORMAL

## 2020-08-17 PROCEDURE — 88305 TISSUE EXAM BY PATHOLOGIST: CPT

## 2020-08-17 PROCEDURE — 88175 CYTOPATH C/V AUTO FLUID REDO: CPT

## 2020-08-18 LAB — CYTOLOGY REG CYTOL: NORMAL

## 2020-11-04 ENCOUNTER — PRE-ADMISSION TESTING (OUTPATIENT)
Dept: ADMISSIONS | Facility: MEDICAL CENTER | Age: 80
End: 2020-11-04
Attending: OBSTETRICS & GYNECOLOGY
Payer: COMMERCIAL

## 2020-11-04 DIAGNOSIS — Z01.810 PRE-OPERATIVE CARDIOVASCULAR EXAMINATION: ICD-10-CM

## 2020-11-04 DIAGNOSIS — Z01.812 PRE-OPERATIVE LABORATORY EXAMINATION: ICD-10-CM

## 2020-11-04 LAB
ANION GAP SERPL CALC-SCNC: 9 MMOL/L (ref 7–16)
BASOPHILS # BLD AUTO: 0.7 % (ref 0–1.8)
BASOPHILS # BLD: 0.04 K/UL (ref 0–0.12)
BUN SERPL-MCNC: 21 MG/DL (ref 8–22)
CALCIUM SERPL-MCNC: 9.8 MG/DL (ref 8.5–10.5)
CHLORIDE SERPL-SCNC: 99 MMOL/L (ref 96–112)
CO2 SERPL-SCNC: 27 MMOL/L (ref 20–33)
COVID ORDER STATUS COVID19: NORMAL
CREAT SERPL-MCNC: 0.66 MG/DL (ref 0.5–1.4)
EKG IMPRESSION: NORMAL
EOSINOPHIL # BLD AUTO: 0.03 K/UL (ref 0–0.51)
EOSINOPHIL NFR BLD: 0.5 % (ref 0–6.9)
ERYTHROCYTE [DISTWIDTH] IN BLOOD BY AUTOMATED COUNT: 45.2 FL (ref 35.9–50)
GLUCOSE SERPL-MCNC: 99 MG/DL (ref 65–99)
HCT VFR BLD AUTO: 46 % (ref 37–47)
HGB BLD-MCNC: 15.4 G/DL (ref 12–16)
IMM GRANULOCYTES # BLD AUTO: 0.07 K/UL (ref 0–0.11)
IMM GRANULOCYTES NFR BLD AUTO: 1.2 % (ref 0–0.9)
LYMPHOCYTES # BLD AUTO: 1.09 K/UL (ref 1–4.8)
LYMPHOCYTES NFR BLD: 18 % (ref 22–41)
MCH RBC QN AUTO: 32.6 PG (ref 27–33)
MCHC RBC AUTO-ENTMCNC: 33.5 G/DL (ref 33.6–35)
MCV RBC AUTO: 97.3 FL (ref 81.4–97.8)
MONOCYTES # BLD AUTO: 0.51 K/UL (ref 0–0.85)
MONOCYTES NFR BLD AUTO: 8.4 % (ref 0–13.4)
NEUTROPHILS # BLD AUTO: 4.3 K/UL (ref 2–7.15)
NEUTROPHILS NFR BLD: 71.2 % (ref 44–72)
NRBC # BLD AUTO: 0 K/UL
NRBC BLD-RTO: 0 /100 WBC
PLATELET # BLD AUTO: 274 K/UL (ref 164–446)
PMV BLD AUTO: 10.3 FL (ref 9–12.9)
POTASSIUM SERPL-SCNC: 4.6 MMOL/L (ref 3.6–5.5)
RBC # BLD AUTO: 4.73 M/UL (ref 4.2–5.4)
SARS-COV-2 RNA RESP QL NAA+PROBE: NOTDETECTED
SODIUM SERPL-SCNC: 135 MMOL/L (ref 135–145)
SPECIMEN SOURCE: NORMAL
WBC # BLD AUTO: 6 K/UL (ref 4.8–10.8)

## 2020-11-04 PROCEDURE — 80048 BASIC METABOLIC PNL TOTAL CA: CPT

## 2020-11-04 PROCEDURE — 85025 COMPLETE CBC W/AUTO DIFF WBC: CPT

## 2020-11-04 PROCEDURE — 93010 ELECTROCARDIOGRAM REPORT: CPT | Performed by: INTERNAL MEDICINE

## 2020-11-04 PROCEDURE — 36415 COLL VENOUS BLD VENIPUNCTURE: CPT

## 2020-11-04 PROCEDURE — U0003 INFECTIOUS AGENT DETECTION BY NUCLEIC ACID (DNA OR RNA); SEVERE ACUTE RESPIRATORY SYNDROME CORONAVIRUS 2 (SARS-COV-2) (CORONAVIRUS DISEASE [COVID-19]), AMPLIFIED PROBE TECHNIQUE, MAKING USE OF HIGH THROUGHPUT TECHNOLOGIES AS DESCRIBED BY CMS-2020-01-R: HCPCS

## 2020-11-04 PROCEDURE — 93005 ELECTROCARDIOGRAM TRACING: CPT

## 2020-11-04 ASSESSMENT — FIBROSIS 4 INDEX: FIB4 SCORE: 1.18430824449154003

## 2020-11-10 ENCOUNTER — ANESTHESIA EVENT (OUTPATIENT)
Dept: SURGERY | Facility: MEDICAL CENTER | Age: 80
End: 2020-11-10
Payer: COMMERCIAL

## 2020-11-10 NOTE — OR NURSING
COVID-19 Pre-surgery screenin. Do you have an undiagnosed respiratory illness or symptoms such as coughing or sneezing? NO (Yes/No)  a. Onset of Sx -  b. Acute vs. chronic respiratory illness -    2. Do you have an unexplained fever greater than 100.4 degrees Fahrenheit or 38 degrees Celsius?     NO (Yes/No)    3. Have you had direct exposure to a patient who tested positive for Covid-19?    NO (Yes/No)    4. Have you had any loss of your sense of taste or smell? Have you had N/V or sore throat? NO    Patient has been informed of visitor policy and asked to wear a mask upon entering the hospital   YES (Yes/No)

## 2020-11-11 ENCOUNTER — ANESTHESIA (OUTPATIENT)
Dept: SURGERY | Facility: MEDICAL CENTER | Age: 80
End: 2020-11-11
Payer: COMMERCIAL

## 2020-11-11 ENCOUNTER — HOSPITAL ENCOUNTER (OUTPATIENT)
Facility: MEDICAL CENTER | Age: 80
End: 2020-11-11
Attending: OBSTETRICS & GYNECOLOGY | Admitting: OBSTETRICS & GYNECOLOGY
Payer: COMMERCIAL

## 2020-11-11 VITALS
HEART RATE: 77 BPM | BODY MASS INDEX: 20.38 KG/M2 | HEIGHT: 63 IN | OXYGEN SATURATION: 95 % | TEMPERATURE: 97.2 F | WEIGHT: 115 LBS | SYSTOLIC BLOOD PRESSURE: 113 MMHG | DIASTOLIC BLOOD PRESSURE: 57 MMHG | RESPIRATION RATE: 20 BRPM

## 2020-11-11 PROBLEM — N83.202 LEFT OVARIAN CYST: Status: ACTIVE | Noted: 2020-11-11

## 2020-11-11 PROBLEM — R93.89 THICKENED ENDOMETRIUM: Status: ACTIVE | Noted: 2020-11-11

## 2020-11-11 LAB
ABO + RH BLD: NORMAL
ABO GROUP BLD: NORMAL
BLD GP AB SCN SERPL QL: NORMAL
ERYTHROCYTE [DISTWIDTH] IN BLOOD BY AUTOMATED COUNT: 42.5 FL (ref 35.9–50)
HCT VFR BLD AUTO: 37.3 % (ref 37–47)
HGB BLD-MCNC: 13 G/DL (ref 12–16)
MCH RBC QN AUTO: 33.1 PG (ref 27–33)
MCHC RBC AUTO-ENTMCNC: 34.9 G/DL (ref 33.6–35)
MCV RBC AUTO: 94.9 FL (ref 81.4–97.8)
PATHOLOGY CONSULT NOTE: NORMAL
PLATELET # BLD AUTO: 233 K/UL (ref 164–446)
PMV BLD AUTO: 10.1 FL (ref 9–12.9)
RBC # BLD AUTO: 3.93 M/UL (ref 4.2–5.4)
RH BLD: NORMAL
WBC # BLD AUTO: 13.2 K/UL (ref 4.8–10.8)

## 2020-11-11 PROCEDURE — 86901 BLOOD TYPING SEROLOGIC RH(D): CPT

## 2020-11-11 PROCEDURE — 500886 HCHG PACK, LAPAROSCOPY: Performed by: OBSTETRICS & GYNECOLOGY

## 2020-11-11 PROCEDURE — 160025 RECOVERY II MINUTES (STATS): Performed by: OBSTETRICS & GYNECOLOGY

## 2020-11-11 PROCEDURE — 88305 TISSUE EXAM BY PATHOLOGIST: CPT

## 2020-11-11 PROCEDURE — 88307 TISSUE EXAM BY PATHOLOGIST: CPT | Mod: 59

## 2020-11-11 PROCEDURE — A4344 CATH INDW FOLEY 2 WAY SILICN: HCPCS | Performed by: OBSTETRICS & GYNECOLOGY

## 2020-11-11 PROCEDURE — 160047 HCHG PACU  - EA ADDL 30 MINS PHASE II: Performed by: OBSTETRICS & GYNECOLOGY

## 2020-11-11 PROCEDURE — 500868 HCHG NEEDLE, SURGI(VARES): Performed by: OBSTETRICS & GYNECOLOGY

## 2020-11-11 PROCEDURE — 86850 RBC ANTIBODY SCREEN: CPT

## 2020-11-11 PROCEDURE — 160048 HCHG OR STATISTICAL LEVEL 1-5: Performed by: OBSTETRICS & GYNECOLOGY

## 2020-11-11 PROCEDURE — 36415 COLL VENOUS BLD VENIPUNCTURE: CPT

## 2020-11-11 PROCEDURE — 501330 HCHG SET, CYSTO IRRIG TUBING: Performed by: OBSTETRICS & GYNECOLOGY

## 2020-11-11 PROCEDURE — 700102 HCHG RX REV CODE 250 W/ 637 OVERRIDE(OP): Performed by: OBSTETRICS & GYNECOLOGY

## 2020-11-11 PROCEDURE — 160029 HCHG SURGERY MINUTES - 1ST 30 MINS LEVEL 4: Performed by: OBSTETRICS & GYNECOLOGY

## 2020-11-11 PROCEDURE — 501664 HCHG TUBING, FILTER STRYKER: Performed by: OBSTETRICS & GYNECOLOGY

## 2020-11-11 PROCEDURE — 501838 HCHG SUTURE GENERAL: Performed by: OBSTETRICS & GYNECOLOGY

## 2020-11-11 PROCEDURE — C2628 CATHETER, OCCLUSION: HCPCS | Performed by: OBSTETRICS & GYNECOLOGY

## 2020-11-11 PROCEDURE — 700105 HCHG RX REV CODE 258: Performed by: OBSTETRICS & GYNECOLOGY

## 2020-11-11 PROCEDURE — A9270 NON-COVERED ITEM OR SERVICE: HCPCS | Performed by: ANESTHESIOLOGY

## 2020-11-11 PROCEDURE — 160009 HCHG ANES TIME/MIN: Performed by: OBSTETRICS & GYNECOLOGY

## 2020-11-11 PROCEDURE — 700102 HCHG RX REV CODE 250 W/ 637 OVERRIDE(OP): Performed by: ANESTHESIOLOGY

## 2020-11-11 PROCEDURE — 500002 HCHG ADHESIVE, DERMABOND: Performed by: OBSTETRICS & GYNECOLOGY

## 2020-11-11 PROCEDURE — 160035 HCHG PACU - 1ST 60 MINS PHASE I: Performed by: OBSTETRICS & GYNECOLOGY

## 2020-11-11 PROCEDURE — 160046 HCHG PACU - 1ST 60 MINS PHASE II: Performed by: OBSTETRICS & GYNECOLOGY

## 2020-11-11 PROCEDURE — 501582 HCHG TROCAR, THRD BLADED: Performed by: OBSTETRICS & GYNECOLOGY

## 2020-11-11 PROCEDURE — 160036 HCHG PACU - EA ADDL 30 MINS PHASE I: Performed by: OBSTETRICS & GYNECOLOGY

## 2020-11-11 PROCEDURE — 86900 BLOOD TYPING SEROLOGIC ABO: CPT

## 2020-11-11 PROCEDURE — 160041 HCHG SURGERY MINUTES - EA ADDL 1 MIN LEVEL 4: Performed by: OBSTETRICS & GYNECOLOGY

## 2020-11-11 PROCEDURE — A9270 NON-COVERED ITEM OR SERVICE: HCPCS | Performed by: OBSTETRICS & GYNECOLOGY

## 2020-11-11 PROCEDURE — 160002 HCHG RECOVERY MINUTES (STAT): Performed by: OBSTETRICS & GYNECOLOGY

## 2020-11-11 PROCEDURE — 501399 HCHG SPECIMAN BAG, ENDO CATC: Performed by: OBSTETRICS & GYNECOLOGY

## 2020-11-11 PROCEDURE — 700101 HCHG RX REV CODE 250: Performed by: OBSTETRICS & GYNECOLOGY

## 2020-11-11 PROCEDURE — 85027 COMPLETE CBC AUTOMATED: CPT

## 2020-11-11 PROCEDURE — 700101 HCHG RX REV CODE 250: Performed by: ANESTHESIOLOGY

## 2020-11-11 PROCEDURE — 500800 HCHG LAPAROSCOPIC J/L HOOK: Performed by: OBSTETRICS & GYNECOLOGY

## 2020-11-11 PROCEDURE — 88112 CYTOPATH CELL ENHANCE TECH: CPT

## 2020-11-11 PROCEDURE — 700111 HCHG RX REV CODE 636 W/ 250 OVERRIDE (IP): Performed by: ANESTHESIOLOGY

## 2020-11-11 PROCEDURE — 502703 HCHG DEVICE, LIGASURE V SEALER: Performed by: OBSTETRICS & GYNECOLOGY

## 2020-11-11 RX ORDER — IBUPROFEN 200 MG
600 TABLET ORAL EVERY 6 HOURS
COMMUNITY
Start: 2020-11-11 | End: 2022-08-19

## 2020-11-11 RX ORDER — BUPIVACAINE HYDROCHLORIDE AND EPINEPHRINE 2.5; 5 MG/ML; UG/ML
INJECTION, SOLUTION EPIDURAL; INFILTRATION; INTRACAUDAL; PERINEURAL
Status: DISCONTINUED | OUTPATIENT
Start: 2020-11-11 | End: 2020-11-11 | Stop reason: HOSPADM

## 2020-11-11 RX ORDER — HYDROMORPHONE HYDROCHLORIDE 1 MG/ML
0.1 INJECTION, SOLUTION INTRAMUSCULAR; INTRAVENOUS; SUBCUTANEOUS
Status: DISCONTINUED | OUTPATIENT
Start: 2020-11-11 | End: 2020-11-11 | Stop reason: HOSPADM

## 2020-11-11 RX ORDER — DIPHENHYDRAMINE HYDROCHLORIDE 50 MG/ML
12.5 INJECTION INTRAMUSCULAR; INTRAVENOUS
Status: DISCONTINUED | OUTPATIENT
Start: 2020-11-11 | End: 2020-11-11 | Stop reason: HOSPADM

## 2020-11-11 RX ORDER — PHENAZOPYRIDINE HYDROCHLORIDE 200 MG/1
200 TABLET, FILM COATED ORAL
Status: COMPLETED | OUTPATIENT
Start: 2020-11-11 | End: 2020-11-11

## 2020-11-11 RX ORDER — METOPROLOL TARTRATE 1 MG/ML
1 INJECTION, SOLUTION INTRAVENOUS
Status: DISCONTINUED | OUTPATIENT
Start: 2020-11-11 | End: 2020-11-11 | Stop reason: HOSPADM

## 2020-11-11 RX ORDER — IBUPROFEN 600 MG/1
600 TABLET ORAL EVERY 6 HOURS PRN
Status: DISCONTINUED | OUTPATIENT
Start: 2020-11-11 | End: 2020-11-11 | Stop reason: HOSPADM

## 2020-11-11 RX ORDER — HYDROMORPHONE HYDROCHLORIDE 1 MG/ML
0.2 INJECTION, SOLUTION INTRAMUSCULAR; INTRAVENOUS; SUBCUTANEOUS
Status: DISCONTINUED | OUTPATIENT
Start: 2020-11-11 | End: 2020-11-11 | Stop reason: HOSPADM

## 2020-11-11 RX ORDER — SCOLOPAMINE TRANSDERMAL SYSTEM 1 MG/1
PATCH, EXTENDED RELEASE TRANSDERMAL
Status: COMPLETED
Start: 2020-11-11 | End: 2020-11-11

## 2020-11-11 RX ORDER — SODIUM CHLORIDE, SODIUM LACTATE, POTASSIUM CHLORIDE, CALCIUM CHLORIDE 600; 310; 30; 20 MG/100ML; MG/100ML; MG/100ML; MG/100ML
INJECTION, SOLUTION INTRAVENOUS CONTINUOUS
Status: DISCONTINUED | OUTPATIENT
Start: 2020-11-11 | End: 2020-11-11 | Stop reason: HOSPADM

## 2020-11-11 RX ORDER — LABETALOL HYDROCHLORIDE 5 MG/ML
5 INJECTION, SOLUTION INTRAVENOUS
Status: DISCONTINUED | OUTPATIENT
Start: 2020-11-11 | End: 2020-11-11 | Stop reason: HOSPADM

## 2020-11-11 RX ORDER — DEXAMETHASONE SODIUM PHOSPHATE 4 MG/ML
INJECTION, SOLUTION INTRA-ARTICULAR; INTRALESIONAL; INTRAMUSCULAR; INTRAVENOUS; SOFT TISSUE PRN
Status: DISCONTINUED | OUTPATIENT
Start: 2020-11-11 | End: 2020-11-11 | Stop reason: SURG

## 2020-11-11 RX ORDER — HYDROMORPHONE HYDROCHLORIDE 1 MG/ML
0.4 INJECTION, SOLUTION INTRAMUSCULAR; INTRAVENOUS; SUBCUTANEOUS
Status: DISCONTINUED | OUTPATIENT
Start: 2020-11-11 | End: 2020-11-11 | Stop reason: HOSPADM

## 2020-11-11 RX ORDER — BUPIVACAINE HYDROCHLORIDE 2.5 MG/ML
INJECTION, SOLUTION EPIDURAL; INFILTRATION; INTRACAUDAL
Status: DISCONTINUED
Start: 2020-11-11 | End: 2020-11-11 | Stop reason: HOSPADM

## 2020-11-11 RX ORDER — ACETAMINOPHEN 500 MG
1000 TABLET ORAL ONCE
Status: COMPLETED | OUTPATIENT
Start: 2020-11-11 | End: 2020-11-11

## 2020-11-11 RX ORDER — HYDRALAZINE HYDROCHLORIDE 20 MG/ML
INJECTION INTRAMUSCULAR; INTRAVENOUS PRN
Status: DISCONTINUED | OUTPATIENT
Start: 2020-11-11 | End: 2020-11-11 | Stop reason: SURG

## 2020-11-11 RX ORDER — OXYCODONE HCL 5 MG/5 ML
5 SOLUTION, ORAL ORAL
Status: DISCONTINUED | OUTPATIENT
Start: 2020-11-11 | End: 2020-11-11 | Stop reason: HOSPADM

## 2020-11-11 RX ORDER — PROMETHAZINE HYDROCHLORIDE 25 MG/1
12.5 SUPPOSITORY RECTAL EVERY 4 HOURS PRN
Status: DISCONTINUED | OUTPATIENT
Start: 2020-11-11 | End: 2020-11-11 | Stop reason: HOSPADM

## 2020-11-11 RX ORDER — LIDOCAINE HYDROCHLORIDE 40 MG/ML
SOLUTION TOPICAL PRN
Status: DISCONTINUED | OUTPATIENT
Start: 2020-11-11 | End: 2020-11-11 | Stop reason: SURG

## 2020-11-11 RX ORDER — CELECOXIB 200 MG/1
200 CAPSULE ORAL ONCE
Status: COMPLETED | OUTPATIENT
Start: 2020-11-11 | End: 2020-11-11

## 2020-11-11 RX ORDER — ONDANSETRON 2 MG/ML
INJECTION INTRAMUSCULAR; INTRAVENOUS PRN
Status: DISCONTINUED | OUTPATIENT
Start: 2020-11-11 | End: 2020-11-11 | Stop reason: SURG

## 2020-11-11 RX ORDER — GABAPENTIN 300 MG/1
300 CAPSULE ORAL ONCE
Status: COMPLETED | OUTPATIENT
Start: 2020-11-11 | End: 2020-11-11

## 2020-11-11 RX ORDER — OXYCODONE HCL 5 MG/5 ML
10 SOLUTION, ORAL ORAL
Status: DISCONTINUED | OUTPATIENT
Start: 2020-11-11 | End: 2020-11-11 | Stop reason: HOSPADM

## 2020-11-11 RX ORDER — LIDOCAINE HYDROCHLORIDE 20 MG/ML
INJECTION, SOLUTION EPIDURAL; INFILTRATION; INTRACAUDAL; PERINEURAL PRN
Status: DISCONTINUED | OUTPATIENT
Start: 2020-11-11 | End: 2020-11-11 | Stop reason: SURG

## 2020-11-11 RX ORDER — ROCURONIUM BROMIDE 10 MG/ML
INJECTION, SOLUTION INTRAVENOUS PRN
Status: DISCONTINUED | OUTPATIENT
Start: 2020-11-11 | End: 2020-11-11 | Stop reason: SURG

## 2020-11-11 RX ORDER — METOCLOPRAMIDE HYDROCHLORIDE 5 MG/ML
INJECTION INTRAMUSCULAR; INTRAVENOUS PRN
Status: DISCONTINUED | OUTPATIENT
Start: 2020-11-11 | End: 2020-11-11 | Stop reason: SURG

## 2020-11-11 RX ORDER — ONDANSETRON 4 MG/1
4 TABLET, FILM COATED ORAL EVERY 8 HOURS PRN
Qty: 6 TAB | Refills: 1 | Status: SHIPPED
Start: 2020-11-11 | End: 2020-11-13

## 2020-11-11 RX ORDER — CEFAZOLIN SODIUM 1 G/3ML
INJECTION, POWDER, FOR SOLUTION INTRAMUSCULAR; INTRAVENOUS PRN
Status: DISCONTINUED | OUTPATIENT
Start: 2020-11-11 | End: 2020-11-11 | Stop reason: SURG

## 2020-11-11 RX ORDER — ACETAMINOPHEN 500 MG
500-1000 TABLET ORAL EVERY 6 HOURS
COMMUNITY
Start: 2020-11-11 | End: 2022-08-19

## 2020-11-11 RX ORDER — EPINEPHRINE 1 MG/ML(1)
AMPUL (ML) INJECTION
Status: DISCONTINUED
Start: 2020-11-11 | End: 2020-11-11 | Stop reason: HOSPADM

## 2020-11-11 RX ORDER — ONDANSETRON 2 MG/ML
4 INJECTION INTRAMUSCULAR; INTRAVENOUS
Status: DISCONTINUED | OUTPATIENT
Start: 2020-11-11 | End: 2020-11-11 | Stop reason: HOSPADM

## 2020-11-11 RX ORDER — SCOLOPAMINE TRANSDERMAL SYSTEM 1 MG/1
PATCH, EXTENDED RELEASE TRANSDERMAL PRN
Status: DISCONTINUED | OUTPATIENT
Start: 2020-11-11 | End: 2020-11-11 | Stop reason: SURG

## 2020-11-11 RX ORDER — HALOPERIDOL 5 MG/ML
1 INJECTION INTRAMUSCULAR
Status: DISCONTINUED | OUTPATIENT
Start: 2020-11-11 | End: 2020-11-11 | Stop reason: HOSPADM

## 2020-11-11 RX ADMIN — SODIUM CHLORIDE, POTASSIUM CHLORIDE, SODIUM LACTATE AND CALCIUM CHLORIDE: 600; 310; 30; 20 INJECTION, SOLUTION INTRAVENOUS at 07:30

## 2020-11-11 RX ADMIN — PROPOFOL 50 MG: 10 INJECTION, EMULSION INTRAVENOUS at 08:13

## 2020-11-11 RX ADMIN — FENTANYL CITRATE 100 MCG: 50 INJECTION, SOLUTION INTRAMUSCULAR; INTRAVENOUS at 09:15

## 2020-11-11 RX ADMIN — ACETAMINOPHEN 1000 MG: 500 TABLET ORAL at 07:01

## 2020-11-11 RX ADMIN — FENTANYL CITRATE 100 MCG: 50 INJECTION, SOLUTION INTRAMUSCULAR; INTRAVENOUS at 07:37

## 2020-11-11 RX ADMIN — POVIDONE IODINE 15 ML: 100 SOLUTION TOPICAL at 07:01

## 2020-11-11 RX ADMIN — PROPOFOL 100 MG: 10 INJECTION, EMULSION INTRAVENOUS at 07:37

## 2020-11-11 RX ADMIN — SCOPALAMINE 1 PATCH: 1 PATCH, EXTENDED RELEASE TRANSDERMAL at 07:26

## 2020-11-11 RX ADMIN — METOCLOPRAMIDE 10 MG: 5 INJECTION, SOLUTION INTRAMUSCULAR; INTRAVENOUS at 07:41

## 2020-11-11 RX ADMIN — IBUPROFEN 600 MG: 600 TABLET, FILM COATED ORAL at 11:25

## 2020-11-11 RX ADMIN — SODIUM CHLORIDE, POTASSIUM CHLORIDE, SODIUM LACTATE AND CALCIUM CHLORIDE: 600; 310; 30; 20 INJECTION, SOLUTION INTRAVENOUS at 07:00

## 2020-11-11 RX ADMIN — FENTANYL CITRATE 100 MCG: 50 INJECTION, SOLUTION INTRAMUSCULAR; INTRAVENOUS at 08:44

## 2020-11-11 RX ADMIN — ONDANSETRON 4 MG: 2 INJECTION INTRAMUSCULAR; INTRAVENOUS at 10:16

## 2020-11-11 RX ADMIN — GABAPENTIN 300 MG: 300 CAPSULE ORAL at 07:01

## 2020-11-11 RX ADMIN — LIDOCAINE HYDROCHLORIDE 160 MG: 40 SOLUTION TOPICAL at 07:37

## 2020-11-11 RX ADMIN — DEXAMETHASONE SODIUM PHOSPHATE 8 MG: 4 INJECTION, SOLUTION INTRA-ARTICULAR; INTRALESIONAL; INTRAMUSCULAR; INTRAVENOUS; SOFT TISSUE at 07:41

## 2020-11-11 RX ADMIN — PROPOFOL 30 MG: 10 INJECTION, EMULSION INTRAVENOUS at 08:17

## 2020-11-11 RX ADMIN — CELECOXIB 200 MG: 200 CAPSULE ORAL at 07:01

## 2020-11-11 RX ADMIN — FENTANYL CITRATE 50 MCG: 50 INJECTION, SOLUTION INTRAMUSCULAR; INTRAVENOUS at 08:05

## 2020-11-11 RX ADMIN — PHENAZOPYRIDINE HYDROCHLORIDE 200 MG: 200 TABLET ORAL at 07:01

## 2020-11-11 RX ADMIN — FENTANYL CITRATE 50 MCG: 50 INJECTION, SOLUTION INTRAMUSCULAR; INTRAVENOUS at 08:17

## 2020-11-11 RX ADMIN — FENTANYL CITRATE 50 MCG: 50 INJECTION, SOLUTION INTRAMUSCULAR; INTRAVENOUS at 08:27

## 2020-11-11 RX ADMIN — ROCURONIUM BROMIDE 25 MG: 10 INJECTION, SOLUTION INTRAVENOUS at 07:37

## 2020-11-11 RX ADMIN — CEFAZOLIN 2 G: 330 INJECTION, POWDER, FOR SOLUTION INTRAMUSCULAR; INTRAVENOUS at 07:35

## 2020-11-11 RX ADMIN — LIDOCAINE HYDROCHLORIDE 50 MG: 20 INJECTION, SOLUTION EPIDURAL; INFILTRATION; INTRACAUDAL at 07:37

## 2020-11-11 RX ADMIN — SUGAMMADEX 100 MG: 100 INJECTION, SOLUTION INTRAVENOUS at 10:31

## 2020-11-11 RX ADMIN — HYDRALAZINE HYDROCHLORIDE 7.5 MG: 20 INJECTION INTRAMUSCULAR; INTRAVENOUS at 09:51

## 2020-11-11 ASSESSMENT — PAIN SCALES - GENERAL: PAIN_LEVEL: 3

## 2020-11-11 ASSESSMENT — PAIN DESCRIPTION - PAIN TYPE
TYPE: ACUTE PAIN;SURGICAL PAIN
TYPE: SURGICAL PAIN
TYPE: ACUTE PAIN;SURGICAL PAIN
TYPE: SURGICAL PAIN
TYPE: SURGICAL PAIN
TYPE: ACUTE PAIN;SURGICAL PAIN
TYPE: SURGICAL PAIN

## 2020-11-11 ASSESSMENT — FIBROSIS 4 INDEX: FIB4 SCORE: 1.35

## 2020-11-11 NOTE — OR NURSING
1256 to pacu 2 report recvd from Erin bates pt is awake alert and comfortable got up to a recliner chair bleeding light to tavo pad denies need for pain meds without nausea tolerating fluids wemt to bring spouse back hes not in lobby at this time   1320 spouse now at bedside  1348 up to bathroom able to void had emesis in bathroom but now states no nausea   1450 ready to dc to home reviewed all dc instructions dressed iv removed   1454 Escorted out to car in wheelchair with all belongings accomp with spouse

## 2020-11-11 NOTE — ANESTHESIA PROCEDURE NOTES
Airway    Date/Time: 11/11/2020 7:37 AM  Performed by: Cassandra Augustin M.D.  Authorized by: Cassandra Augustin M.D.     Location:  OR  Urgency:  Elective  Indications for Airway Management:  Anesthesia      Spontaneous Ventilation: absent    Sedation Level:  Deep  Preoxygenated: Yes    Patient Position:  Sniffing  Mask Difficulty Assessment:  2 - vent by mask + OA or adjuvant +/- NMBA  Final Airway Type:  Endotracheal airway  Final Endotracheal Airway:  ETT  Cuffed: Yes    Technique Used for Successful ETT Placement:  Direct laryngoscopy    Insertion Site:  Oral  Blade Type:  Ekven  Laryngoscope Blade/Videolaryngoscope Blade Size:  3  ETT Size (mm):  6.5  Measured from:  Teeth  ETT to Teeth (cm):  21  Placement Verified by: auscultation and capnometry    Cormack-Lehane Classification:  Grade I - full view of glottis  Number of Attempts at Approach:  1

## 2020-11-11 NOTE — ANESTHESIA PREPROCEDURE EVALUATION
80 yo pt in good health, walks for 1 hour each morning    Relevant Problems   CARDIAC   (+) Systolic murmur       Physical Exam    Airway   Mallampati: II  TM distance: >3 FB  Neck ROM: full       Cardiovascular - normal exam  Rhythm: regular  Rate: normal  (+) murmur     Dental - normal exam           Pulmonary - normal exam  Breath sounds clear to auscultation     Abdominal    Neurological - normal exam         Other findings: 2/6 systolic murmur c/w med record.            Anesthesia Plan    ASA 2       Plan - general       Airway plan will be ETT        Induction: intravenous    Postoperative Plan: Postoperative administration of opioids is intended.    Pertinent diagnostic labs and testing reviewed    Informed Consent:    Anesthetic plan and risks discussed with patient.    Use of blood products discussed with: patient whom consented to blood products.

## 2020-11-11 NOTE — OR SURGEON
Immediate Post OP Note    PreOp Diagnosis:     Left ovarian cyst  Thickened endometrium    PostOp Diagnosis:     same    Procedure(s):  HYSTERECTOMY, LAPAROSCOPIC-TOTAL - Wound Class: Clean Contaminated  SALPINGO-OOPHORECTOMY - Wound Class: Clean Contaminated  CYSTOSCOPY-DIAGNOSTIC - Wound Class: Clean Contaminated    Surgeon(s):  YUSRA Pretty M.D.    Anesthesiologist/Type of Anesthesia:  Anesthesiologist: Cassandra Augustin M.D./General    Surgical Staff:  Circulator: PEDRO LUIS Healy Circulator: Dorcas Santamaria R.N.  Relief Scrub: Suellen Shah  Scrub Person: Micki Gallardo    Specimens removed if any:  ID Type Source Tests Collected by Time Destination   1 : pelvic washings on entry  Other Other MISCELLANEOUS TEST Max Villatoro M.D. 11/11/2020  8:29 AM    A : left tube and ovary Other Other PATHOLOGY SPECIMEN Max Villatoro M.D. 11/11/2020  9:02 AM    B : uterus, cervix, right fallopian tube, right ovary  Tissue Uterus PATHOLOGY SPECIMEN Max Villatoro M.D. 11/11/2020  9:29 AM        Estimated Blood Loss: 200 cc    Findings:     Washing on entry sent for cytology  5 cm serous left ovarian cyst  Normal right ovary  Normal small uterus  S/p bilateral partial salpingectomy  Bladder and ureters intact at cystoscopy    Complications: none        11/11/2020 10:38 AM Max Villatoro M.D.

## 2020-11-11 NOTE — DISCHARGE INSTRUCTIONS
ACTIVITY: Rest and take it easy for the first 24 hours.  A responsible adult is recommended to remain with you during that time.  It is normal to feel sleepy.  We encourage you to not do anything that requires balance, judgment or coordination.    MILD FLU-LIKE SYMPTOMS ARE NORMAL. YOU MAY EXPERIENCE GENERALIZED MUSCLE ACHES, THROAT IRRITATION, HEADACHE AND/OR SOME NAUSEA.    FOR 24 HOURS DO NOT:  Drive, operate machinery or run household appliances.  Drink beer or alcoholic beverages.   Make important decisions or sign legal documents.    SPECIAL INSTRUCTIONS: Please see attached instructions    DIET: To avoid nausea, slowly advance diet as tolerated, avoiding spicy or greasy foods for the first day.  Add more substantial food to your diet according to your physician's instructions.  Babies can be fed formula or breast milk as soon as they are hungry.  INCREASE FLUIDS AND FIBER TO AVOID CONSTIPATION.    SURGICAL DRESSING/BATHING: Please see attached instructions    FOLLOW-UP APPOINTMENT:  A follow-up appointment should be arranged with your doctor in 2 weeks; call to schedule.    You should CALL YOUR PHYSICIAN if you develop:  Fever greater than 101 degrees F.  Pain not relieved by medication, or persistent nausea or vomiting.  Excessive bleeding (blood soaking through dressing) or unexpected drainage from the wound.  Extreme redness or swelling around the incision site, drainage of pus or foul smelling drainage.  Inability to urinate or empty your bladder within 8 hours.  Problems with breathing or chest pain.    You should call 911 if you develop problems with breathing or chest pain.  If you are unable to contact your doctor or surgical center, you should go to the nearest emergency room or urgent care center.  Physician's telephone #: Dr. Villatoro [phone]    If any questions arise, call your doctor.  If your doctor is not available, please feel free to call the Surgical Center at [phone]. The  Contact Center is open Monday through Friday 7AM to 5PM and may speak to a nurse at [phone], or toll free at [phone].     A registered nurse may call you a few days after your surgery to see how you are doing after your procedure.    MEDICATIONS: Resume taking daily medication.  Take prescribed pain medication with food.  If no medication is prescribed, you may take non-aspirin pain medication if needed.  PAIN MEDICATION CAN BE VERY CONSTIPATING.  Take a stool softener or laxative such as senokot, pericolace, or milk of magnesia if needed.    Prescription given for NONE.  Last pain medication given at Ibuprofen at 11:26am, next dose after 5:26pm.    If your physician has prescribed pain medication that includes Acetaminophen (Tylenol), do not take additional Acetaminophen (Tylenol) while taking the prescribed medication.    Depression / Suicide Risk    As you are discharged from this [facility] facility, it is important to learn how to keep safe from harming yourself.    Recognize the warning signs:  · Abrupt changes in personality, positive or negative- including increase in energy   · Giving away possessions  · Change in eating patterns- significant weight changes-  positive or negative  · Change in sleeping patterns- unable to sleep or sleeping all the time   · Unwillingness or inability to communicate  · Depression  · Unusual sadness, discouragement and loneliness  · Talk of wanting to die  · Neglect of personal appearance   · Rebelliousness- reckless behavior  · Withdrawal from people/activities they love  · Confusion- inability to concentrate     If you or a loved one observes any of these behaviors or has concerns about self-harm, here's what you can do:  · Talk about it- your feelings and reasons for harming yourself  · Remove any means that you might use to hurt yourself (examples: pills, rope, extension cords, firearm)  · Get professional help from the community (Mental Health, Substance  Abuse, psychological counseling)  · Do not be alone:Call your Safe Contact- someone whom you trust who will be there for you.  · Call your local CRISIS HOTLINE 031-7230 or 521-001-2944  · Call your local Children's Mobile Crisis Response Team [place] (881) 768-5763 or www.Weotta  · Call the toll free National Suicide Prevention Hotlines   · National Suicide Prevention Lifeline 984-824-KGFV (1083)  · National Hope Line Network 800-SUICIDE (078-4778)

## 2020-11-11 NOTE — OP REPORT
DATE OF SERVICE:  2020    PROCEDURES:  1.  Pelvic washings  2.  Total laparoscopic hysterectomy, bilateral salpingo-oophorectomy.  3.  Uterosacral ligament colpopexy.  4.  Diagnostic cystoscopy.    SURGEON:  Max Villatoro MD    ASSISTANT:  Bernice Canada MD    ANESTHESIOLOGIST:  Cassandra Augustin MD    ANESTHESIA:  General.    PREOPERATIVE DIAGNOSES:  1.  Enlarging left ovarian cyst.  2.  Endometrial thickening.    POSTOPERATIVE DIAGNOSES:  1.  Benign appearing left ovarian cyst.  2.  Endometrial thickening.  3.  Status post bilateral partial salpingectomy.    COMPLICATIONS:  None.    ESTIMATED BLOOD LOSS:  200 mL    SPECIMENS SENT TO PATHOLOGY:  1.  Pelvic washings on entry.  2.  Left fallopian tube and ovary.  3.  Uterus.  4.  Right fallopian tube and ovary.    INDICATIONS:  This 79-year-old lady is  3, para 2.  She came for   definitive treatment of an enlarging , almost certainly benign postmenopausal   left ovarian cyst, and sonographically thickened endometrium.  Her   preoperative office endometrial biopsy was normal.  Serum tumor markers were   within normal limits.  There was no free fluid noted on ultrasound.    OPERATION:  The patient went to the OR.  General anesthesia was administered.    We gently placed her lower legs in padded Addison universal stirrups with her   thighs slightly flexed.  Bimanual exam under anesthesia revealed no palpable   pelvic masses and a very well-supported vaginal vault and cervix.    Arms were padded and tucked by her sides, shoulder bolsters placed. She was   prepped and draped.  Time-out was done.  I placed a Rollins catheter using   sterile technique.  I applied traction to the cervix with a tenaculum and   injected a total of 10 mL 0.25% Marcaine with epinephrine in the form of a   paracervical block.  I gently dilated the internal os. the uterus sounded to 8   cm, mid plane.  I introduced an 8 cm long Dominga device into the uterus,   inflated the balloon,  and anchored it to the articulating Dominga II device with   a 3.0 cm  cup, to mimic the outer diameter of the cervix.  This   device was locked into place and we proceeded with laparoscopy.    I used a total of 4 laparoscopic incisions were utilized, an 11 mm vertical incision   under the umbilicus and three 7 mm incisions, 2 on the patient's left and 1 on   the right.  Veress needle was introduced through the umbilical incision.    Carbon dioxide was insufflated.  The Veress needle was withdrawn.  I easily   passed an 11 mm trocar and sheath intraperitoneally.  There were no adhesions   involving the anterior abdominal wall.  I introduced three 7 mm sheaths with   laparoscopic guidance and anchored them with intraperitoneal balloons.    The upper abdomen appeared normal.  There were no adhesions between the liver   and diaphragm.  All visible surfaces of the intestines and omentum were smooth   and glistening.  The uterus was freely mobile.  Both anterior and posterior   cul-de-sacs were easily visualized.  Uterosacral ligaments appeared normal.    She appeared to have had bilateral partial salpingectomy in the past.    Proximal tubal segments and fimbria were present on both sides.  There was a 5   cm diameter externally benign appearing smooth left ovarian cyst along with   filmy and fibrous adhesions connecting the left adnexa onto the pelvic   sidewall peritoneum.  Right fallopian tube and ovary appeared normal.    Prior to manipulating any pelvic organs, pelvic washings were sent for   cytology.    First I performed a laparoscopic left salpingo-oophorectomy, keeping the left   ovarian cyst completely intact.  All pedicles were sealed with the   5 mm Ligasure vessel sealer. I triply sealed and divided the   infundibulopelvic ligament and then sealed and divided multiple fibrous   peritoneal adhesions.  The proximal left fallopian tube and left utero-ovarian   ligaments were triply sealed and divided 1  cm lateral to the uterus.    Mesovarium and mesosalpinx were sealed and divided with the LigaSure vessel   sealer.  The specimen was completely freed up, placed into a laparoscopic bag   and extracted from the abdomen, without violating the cyst in any way, through   the 11 mm sheath.    The 5 mm LigaSure vessel sealer was used to triply seal and divide the right   infundibulopelvic ligament.  I progressed up the mesosalpinx and mesovarium   with the vessel sealer, sealing and dividing to within 1 cm of the uterus.    Both round ligaments were triply sealed and divided 2 cm from the uterus.  I   incised the vesicouterine peritoneum between the round ligament pedicles with   the unipolar cautery hook and I pushed the bladder off the cervix and lower   uterine segment using blunt traction and unipolar cautery.  Once the   glistening white pubocervical fascia was identified all across the anterior   cervix, I secured the uterine artery pedicles on each side with the LigaSure   vessel sealer .  Multiple applications of LigaSure energy were used to   control the uterine arteries on both sides beginning at the level of the   internal os, until the uterine artery tissue fell laterally away from the   outline of the  cup, applying firm upward pressure on the    cup at all times.    Circumferential colpotomy was performed with unipolar cautery hook, applying   firm upward pressure on the  cup, completely and precisely   amputating the cervix from the upper vagina.  The uterus and right fallopian   tube were extracted transvaginally and an occluding glove containing 2 sponges   were placed in the vagina to help maintain pneumoperitoneum during cuff   closure.    The vaginal cuff was approximated front to back, right to left, with 2-0   Stratafix PDS barbed unidirectional knotless suture , incorporating precise   vaginal mucosa in each throw.  Substantial purchases of the uterosacral   ligaments  were approximated to the vaginal vault creating uterosacral ligament   colpopexy.  The peritoneum was reapproximated from left to right, and the   Stratafix suture was trimmed close to the peritoneum.  The CT-1 needle and   redundant Stratafix suture were extracted through the 11 mm sheath.  Sponge   and needle counts were correct.    The pelvis was irrigated.  The irrigant was removed.  Good hemostasis was   noted throughout.  The abdomen was desufflated.  All 4 laparoscopic sheaths   were removed.  I closed the rectus fascia beneath the umbilical incision with   a figure-of-eight 0 Vicryl suture.  I closed all 4 skin incisions with   subcuticular 4-0 Vicryl.  Dermabond wound adhesive was applied to all 4   incisions.    Post-hysterectomy diagnostic cystoscopy revealed an intact bladder with normal   egress of Pyridium-stained urine from each ureteral orifice.  The bladder was   drained and the Rollins catheter was not replaced in anticipation of a voiding   trial.    Sponge and needle counts were correct.  There were no immediate complications.       ____________________________________     MD JACOB HENDRICKSON / YAZMIN    DD:  11/11/2020 12:16:49  DT:  11/11/2020 12:56:03    D#:  [id number]  Job#:  [id number]    cc: TIARRA BIRCH MD

## 2020-11-11 NOTE — ANESTHESIA TIME REPORT
Anesthesia Start and Stop Event Times     Date Time Event    11/11/2020 0712 Ready for Procedure     0730 Anesthesia Start     1042 Anesthesia Stop        Responsible Staff  11/11/20    Name Role Begin End    Cassandra Augustin M.D. Anesth 0730 1042        Preop Diagnosis (Free Text):  Pre-op Diagnosis     LEFT OVARIAN CYST, THICKENED ENDOEMTRIUM        Preop Diagnosis (Codes):    Post op Diagnosis  Ovarian cyst      Premium Reason  Non-Premium    Comments:

## 2020-11-12 NOTE — ANESTHESIA POSTPROCEDURE EVALUATION
Patient: Antonio Treviño    Procedure Summary     Date: 11/11/20 Room / Location: [facility] ROOM 23 / SURGERY SAME DAY [facility]    Anesthesia Start: 0730 Anesthesia Stop: 1042    Procedures:       HYSTERECTOMY, LAPAROSCOPIC-TOTAL (Uterus)      SALPINGO-OOPHORECTOMY (Bilateral Fallopian Tube)      CYSTOSCOPY-DIAGNOSTIC (Bladder) Diagnosis: (LEFT OVARIAN CYST, THICKENED ENDOEMTRIUM)    Surgeons: Max Villatoro M.D. Responsible Provider: Cassandra Augustin M.D.    Anesthesia Type: general ASA Status: 2          Final Anesthesia Type: general  Last vitals  BP   Blood Pressure : 113/57    Temp   36.2 °C (97.2 °F)    Pulse   Pulse: 77   Resp   20    SpO2   95 %      Anesthesia Post Evaluation    Patient location during evaluation: PACU  Patient participation: complete - patient participated  Level of consciousness: awake and alert  Pain score: 3    Airway patency: patent  Anesthetic complications: no  Cardiovascular status: hemodynamically stable  Respiratory status: acceptable  Hydration status: euvolemic    PONV: none           Nurse Pain Score: 3 (NPRS)

## 2020-12-09 ENCOUNTER — OFFICE VISIT (OUTPATIENT)
Dept: URGENT CARE | Facility: CLINIC | Age: 80
End: 2020-12-09
Payer: COMMERCIAL

## 2020-12-09 ENCOUNTER — HOSPITAL ENCOUNTER (EMERGENCY)
Facility: MEDICAL CENTER | Age: 80
End: 2020-12-09
Attending: EMERGENCY MEDICINE
Payer: COMMERCIAL

## 2020-12-09 ENCOUNTER — APPOINTMENT (OUTPATIENT)
Dept: RADIOLOGY | Facility: MEDICAL CENTER | Age: 80
End: 2020-12-09
Attending: EMERGENCY MEDICINE
Payer: COMMERCIAL

## 2020-12-09 ENCOUNTER — APPOINTMENT (OUTPATIENT)
Dept: RADIOLOGY | Facility: IMAGING CENTER | Age: 80
End: 2020-12-09
Attending: PHYSICIAN ASSISTANT
Payer: COMMERCIAL

## 2020-12-09 VITALS
HEIGHT: 63 IN | HEART RATE: 66 BPM | OXYGEN SATURATION: 96 % | DIASTOLIC BLOOD PRESSURE: 75 MMHG | RESPIRATION RATE: 16 BRPM | SYSTOLIC BLOOD PRESSURE: 129 MMHG | TEMPERATURE: 97.7 F | BODY MASS INDEX: 20.08 KG/M2 | WEIGHT: 113.32 LBS

## 2020-12-09 VITALS
HEIGHT: 63 IN | WEIGHT: 113.5 LBS | HEART RATE: 69 BPM | BODY MASS INDEX: 20.11 KG/M2 | TEMPERATURE: 98.4 F | OXYGEN SATURATION: 97 % | DIASTOLIC BLOOD PRESSURE: 62 MMHG | SYSTOLIC BLOOD PRESSURE: 112 MMHG

## 2020-12-09 DIAGNOSIS — M54.16 ACUTE RIGHT LUMBAR RADICULOPATHY: ICD-10-CM

## 2020-12-09 DIAGNOSIS — S33.130A: ICD-10-CM

## 2020-12-09 DIAGNOSIS — M54.41 ACUTE RIGHT-SIDED LOW BACK PAIN WITH RIGHT-SIDED SCIATICA: ICD-10-CM

## 2020-12-09 PROBLEM — M72.2 PLANTAR FASCIITIS: Status: ACTIVE | Noted: 2020-09-09

## 2020-12-09 PROBLEM — M48.061 LUMBAR SPINAL STENOSIS: Status: ACTIVE | Noted: 2019-12-11

## 2020-12-09 PROBLEM — G62.9 PERIPHERAL NEUROPATHY: Status: ACTIVE | Noted: 2019-09-05

## 2020-12-09 PROBLEM — M25.579 ANKLE PAIN: Status: ACTIVE | Noted: 2019-09-05

## 2020-12-09 PROCEDURE — 74176 CT ABD & PELVIS W/O CONTRAST: CPT

## 2020-12-09 PROCEDURE — 72131 CT LUMBAR SPINE W/O DYE: CPT

## 2020-12-09 PROCEDURE — 72110 X-RAY EXAM L-2 SPINE 4/>VWS: CPT | Mod: TC,FY | Performed by: PHYSICIAN ASSISTANT

## 2020-12-09 PROCEDURE — 700102 HCHG RX REV CODE 250 W/ 637 OVERRIDE(OP): Performed by: EMERGENCY MEDICINE

## 2020-12-09 PROCEDURE — 99203 OFFICE O/P NEW LOW 30 MIN: CPT | Performed by: PHYSICIAN ASSISTANT

## 2020-12-09 PROCEDURE — 99283 EMERGENCY DEPT VISIT LOW MDM: CPT

## 2020-12-09 PROCEDURE — A9270 NON-COVERED ITEM OR SERVICE: HCPCS | Performed by: EMERGENCY MEDICINE

## 2020-12-09 PROCEDURE — 700111 HCHG RX REV CODE 636 W/ 250 OVERRIDE (IP): Performed by: EMERGENCY MEDICINE

## 2020-12-09 RX ORDER — OXYCODONE HYDROCHLORIDE AND ACETAMINOPHEN 5; 325 MG/1; MG/1
1 TABLET ORAL ONCE
Status: COMPLETED | OUTPATIENT
Start: 2020-12-09 | End: 2020-12-09

## 2020-12-09 RX ORDER — OXYCODONE HYDROCHLORIDE AND ACETAMINOPHEN 5; 325 MG/1; MG/1
1 TABLET ORAL EVERY 4 HOURS PRN
Qty: 12 TAB | Refills: 0 | Status: SHIPPED | OUTPATIENT
Start: 2020-12-09 | End: 2020-12-12

## 2020-12-09 RX ORDER — ONDANSETRON 4 MG/1
4 TABLET, ORALLY DISINTEGRATING ORAL EVERY 8 HOURS PRN
Qty: 10 TAB | Refills: 0 | Status: SHIPPED | OUTPATIENT
Start: 2020-12-09 | End: 2022-08-19

## 2020-12-09 RX ORDER — ONDANSETRON 4 MG/1
4 TABLET, ORALLY DISINTEGRATING ORAL ONCE
Status: COMPLETED | OUTPATIENT
Start: 2020-12-09 | End: 2020-12-09

## 2020-12-09 RX ADMIN — OXYCODONE HYDROCHLORIDE AND ACETAMINOPHEN 1 TABLET: 5; 325 TABLET ORAL at 16:52

## 2020-12-09 RX ADMIN — ONDANSETRON 4 MG: 4 TABLET, ORALLY DISINTEGRATING ORAL at 16:52

## 2020-12-09 ASSESSMENT — ENCOUNTER SYMPTOMS
TINGLING: 0
ABDOMINAL PAIN: 0
FLANK PAIN: 0
VOMITING: 0
DIZZINESS: 0
WEAKNESS: 1
BRUISES/BLEEDS EASILY: 0
MYALGIAS: 1
BACK PAIN: 1
SENSORY CHANGE: 0
HEADACHES: 0
FOCAL WEAKNESS: 0
FEVER: 0
CHILLS: 0
NECK PAIN: 0
SHORTNESS OF BREATH: 0
NAUSEA: 0

## 2020-12-09 ASSESSMENT — FIBROSIS 4 INDEX
FIB4 SCORE: 1.61
FIB4 SCORE: 1.61

## 2020-12-09 NOTE — PROGRESS NOTES
Subjective:   Antonio Treviño is a 80 y.o. female who presents for Low Back Pain (x1 day right side ) and Hip Pain (x1 day right )      HPI  80 y.o. female with history of spinal stenosis presents to urgent care with new problem to provider of acute right-sided low back pain that radiates to right hip onset yesterday.  Patient denies specific injury.  Patient reports pain is worse with extension of her spine and relieved with bending forward.  She has tried previously prescribed Zanaflex and ibuprofen no symptomatic relief.  She denies unilateral weakness.  Reports her pain is worse with ambulation.  She denies bowel or bladder incontinence or saddle anesthesias.  History of previous compression fracture of lumbar spine.  She is followed by Dr. Land. Denies other associated aggravating or alleviating factors.     Review of Systems   Constitutional: Negative for chills, fever and malaise/fatigue.   Respiratory: Negative for shortness of breath.    Cardiovascular: Negative for chest pain.   Gastrointestinal: Negative for abdominal pain, nausea and vomiting.   Genitourinary: Negative for flank pain and hematuria.   Musculoskeletal: Positive for back pain and myalgias. Negative for neck pain.   Neurological: Positive for weakness. Negative for dizziness, tingling, sensory change, focal weakness and headaches.   Endo/Heme/Allergies: Does not bruise/bleed easily.   All other systems reviewed and are negative.      Patient Active Problem List   Diagnosis   • Osteopenia   • HLD (hyperlipidemia)   • S/P colonoscopy-2011 neg in [place]   • Systolic murmur   • Vitamin D deficiency   • Closed compression fracture of third lumbar vertebra (HCC)   • Benign paroxysmal positional vertigo due to bilateral vestibular disorder   • Bilateral lumbar radiculopathy   • Left ovarian cyst   • Thickened endometrium     Past Surgical History:   Procedure Laterality Date   • HYSTERECTOMY LAPAROSCOPY  11/11/2020    Procedure: HYSTERECTOMY,  "LAPAROSCOPIC-TOTAL;  Surgeon: Max Villatoro M.D.;  Location: SURGERY SAME DAY [facility];  Service: Gynecology   • SALPINGO OOPHORECTOMY Bilateral 11/11/2020    Procedure: SALPINGO-OOPHORECTOMY;  Surgeon: Max Villatoro M.D.;  Location: SURGERY SAME DAY [facility];  Service: Gynecology   • CYSTOSCOPY  11/11/2020    Procedure: CYSTOSCOPY-DIAGNOSTIC;  Surgeon: Max Villatoro M.D.;  Location: SURGERY SAME DAY [facility];  Service: Gynecology   • OTHER  2020    laminectomy   • CATARACT PHACO WITH IOL  7/2/2013    Performed by Bin Cardona M.D. at [facility]   • TENDON TRANSFER       Social History     Tobacco Use   • Smoking status: Never Smoker   • Smokeless tobacco: Never Used   Substance Use Topics   • Alcohol use: No   • Drug use: No      History reviewed. No pertinent family history.   (Allergies, Medications, & Tobacco/Substance Use were reconciled by the Medical Assistant and reviewed by myself. The family history is prepopulated)     Objective:     /62 (BP Location: Right arm, Patient Position: Sitting, BP Cuff Size: Adult)   Pulse 69   Temp 36.9 °C (98.4 °F) (Temporal)   Ht 1.6 m (5' 3\")   Wt 51.5 kg (113 lb 8 oz)   SpO2 97%   BMI 20.11 kg/m²     Physical Exam  Vitals signs reviewed.   Constitutional:       General: She is not in acute distress.     Appearance: Normal appearance. She is well-developed.   HENT:      Head: Normocephalic and atraumatic.      Mouth/Throat:      Mouth: Mucous membranes are moist.      Pharynx: Oropharynx is clear.   Eyes:      Conjunctiva/sclera: Conjunctivae normal.   Neck:      Musculoskeletal: Normal range of motion and neck supple. No muscular tenderness.   Cardiovascular:      Rate and Rhythm: Normal rate and regular rhythm.      Heart sounds: Normal heart sounds.   Pulmonary:      Effort: Pulmonary effort is normal. No respiratory distress.      Breath sounds: Normal breath sounds.   Abdominal:      Palpations: Abdomen is soft.      " Tenderness: There is no abdominal tenderness.   Musculoskeletal:      Comments: Minimal midline tenderness with palpation of lumbar vertebral bodies. No step-off or deformity noted. Right sided paraspinous muscular tenderness over lumbar region. No radiculopathy. Strengths are 5/5 bilateral lower extremities. Distal neurovascular intact     Skin:     General: Skin is warm and dry.      Findings: No erythema.   Neurological:      General: No focal deficit present.      Mental Status: She is alert and oriented to person, place, and time.      Cranial Nerves: No cranial nerve deficit.      Sensory: No sensory deficit.      Motor: No weakness.      Gait: Gait normal.      Comments: Slowed gait secondary to pain   Psychiatric:         Mood and Affect: Mood normal.         Behavior: Behavior normal.         Thought Content: Thought content normal.         Judgment: Judgment normal.         Assessment/Plan:     1. Acute right-sided low back pain with right-sided sciatica  DX-LUMBAR SPINE-4+ VIEWS     Narrative & Impression        12/9/2020 2:47 PM     HISTORY/REASON FOR EXAM:  Atraumatic Pain.  Back pain     TECHNIQUE/ EXAM DESCRIPTION AND NUMBER OF VIEWS:  4 views of the lumbar spine.     COMPARISON: Lumbar spine x-ray 3/13/2020     FINDINGS:  Lumbar vertebral bodies: Old L2 compression fracture.     Alignment: L3-4 subluxation measures 3 mm in the neutral position and increases to 6 mm with flexion.           L4-5 subluxation measures 2 mm in the neutral position increases to 4 mm flexion     There is mild dextroconvex scoliosis.     There has been L5 laminectomy.     There is multilevel facet arthropathy.     The visualized portions of the abdomen are within normal limits.     IMPRESSION:     1.  Degenerative subluxations at L3-4 and L4-5 with abnormal motion     2.  Multilevel facet arthropathy     3.  Mild dextroconvex scoliosis     4.  Prior L5 laminectomy     5.  Old L2 compression fracture     Patient presents to  urgent care with acute low back pain.  On exam she is in mild distress secondary to her pain.  She has taken muscle relaxant and ibuprofen with no symptomatic relief.  Her x-ray imaging shows new subluxation at L3-4 with 2 mm of movement with flexion.  Due to patient's age, previous history of lumbar compression fracture and L5 laminectomy, and acute pain I recommend patient proceed to emergency department for further evaluation and medical treatment plan.  Patient has no focal neuro deficits at this time.  Discussed with patient risks of further subluxation of lumbar spine and possible spinal cord compression.  Patient needs higher level of medical care.  Consider MRI imaging of lumbar spine.  Patient is followed by Dr. Land.  Patient understands treatment plan and will proceed per private vehicle to emergency department of her choice driven by her .  Patient has no further questions regarding care.     Please note that this dictation was created using voice recognition software. I have made a reasonable attempt to correct obvious errors, but I expect that there are errors of grammar and possibly content that I did not discover before finalizing the note.    This note was electronically signed by Anu Godoy PA-C

## 2020-12-09 NOTE — ED TRIAGE NOTES
Pt presents with  from urgent care for right low back pain that radiates down right glute. Denies bowel/bladder problems. Pain started yesterday after getting out of a chair - denies trauma. Using aleve and zanaflex without relief. Hx of laminectomy. Pt A&Ox4 and ambulatory w pain. Declines wheelchair.     Patient masked. No respiratory symptoms, no recent travel, denies known COVID exposure.

## 2020-12-10 NOTE — ED PROVIDER NOTES
ED Provider Note    CHIEF COMPLAINT  Chief Complaint   Patient presents with   • Back Pain       HPI  Antonio Treviño is a 80 y.o. female who presents for evaluation of low back pain.  Acute onset of right-sided low back pain about 24 hours ago, no trauma.  She has a history of laminectomy.  She has no weakness, numbness or tingling.  While seated she states that the pain is gone, when she stands and puts weight on her leg she experiences pain in the midline, right buttocks and half-way down the right thigh posteriorly.  No difficulty urinating, no bowel incontinence, no saddle anesthesia.  Went to urgent care where she had an x-ray revealing degenerative subluxations at L3-L4 as well as L4-L5 more prominent with flexion.    REVIEW OF SYSTEMS  Negative for fever, rash, chest pain, dyspnea, abdominal pain. All other systems are negative.     PAST MEDICAL HISTORY   has a past medical history of Anesthesia, Bowel habit changes, Cataract, Dry eyes, Heart murmur, High cholesterol (11/02/2020), Hyperlipidemia, Osteopenia, OSTEOPOROSIS, and Vitiligo.    SOCIAL HISTORY  Social History     Tobacco Use   • Smoking status: Never Smoker   • Smokeless tobacco: Never Used   Substance and Sexual Activity   • Alcohol use: No   • Drug use: No   • Sexual activity: Not on file       SURGICAL HISTORY   has a past surgical history that includes cataract phaco with iol (7/2/2013); tendon transfer; other (2020); hysterectomy laparoscopy (11/11/2020); salpingo oophorectomy (Bilateral, 11/11/2020); and cystoscopy (11/11/2020).    CURRENT MEDICATIONS  I personally reviewed the medication list in the charting documentation.     ALLERGIES  Allergies   Allergen Reactions   • Codeine      Nausea and vommiting  Other reaction(s): nausea and vomiting   • Morphine      Nausea amd vomitting  Other reaction(s): nausea and vomiting       PHYSICAL EXAM  VITAL SIGNS: BP (!) 173/86   Pulse 71   Temp 36.4 °C (97.6 °F) (Temporal)   Resp 16   Ht 1.6 m (5'  "3\")   Wt 51.4 kg (113 lb 5.1 oz)   SpO2 99%   BMI 20.07 kg/m²   Constitutional: Alert in no apparent distress.  HENT: No signs of trauma.   Eyes: Conjunctiva normal, Non-icteric.   Chest: Normal nonlabored respirations  Skin: No erythema, No rash.   Musculoskeletal: No tenderness on palpation of the low back.  Limited range of motion of the right hip secondary to pain.  Neurologic: Alert, No focal deficits noted.  Normal and symmetric lower extremity motor and sensory function bilaterally.  Psychiatric: Affect normal, Judgment normal.    DIAGNOSTIC STUDIES / PROCEDURES    RADIOLOGY  CT-LSPINE W/O PLUS RECONS   Final Result      Chronic appearing L2 superior endplate compression fracture. No acute fracture or dislocation.      Postsurgical and degenerative changes as detailed.      CT-RENAL COLIC EVALUATION(A/P W/O)   Final Result         1.  No acute abnormality.   2.  Chronic appearing L2 superior endplate compression fracture.                     COURSE & MEDICAL DECISION MAKING  Pertinent Labs & Imaging studies reviewed. (See chart for details)    Encounter Summary: This is a 80 y.o. female with atraumatic low back pain, sent here from urgent care specifically for an MRI although given her presentation with no focal neurologic complaints or findings on exam, no red flags for spinal cord compression, there is no indication for emergent MRI through the emergency department.  An x-ray performed at the urgent care reveals up to 3 mm of subluxation with flexion of her lumbar spine.  At this point, specially given the degenerative findings on that x-ray, CT scan will be obtained.  This will also include the abdomen pelvis looking for other pathology such as aortic catastrophes and retroperitoneal hematomas as well as the osseous structures of the pelvis.  She will be treated with Percocet, she states in the past she has had some nausea and vomiting with opiates and I will also treat her with Zofran and she will be " reevaluated -------- CT scans reveal the chronic L2 compression fracture with no other acute abnormalities.  The patient is feeling somewhat improved after the Percocet.  At this point, she will follow-up as an outpatient with her neurosurgeon Dr. Land.  In the meantime I will prescribe Percocet and Zofran for pain, strict return instructions have been discussed      DISPOSITION: Discharge Home      FINAL IMPRESSION  1. Acute right lumbar radiculopathy        This dictation was created using voice recognition software. The accuracy of the dictation is limited to the abilities of the software. I expect there may be some errors of grammar and possibly content. The nursing notes were reviewed and certain aspects of this information were incorporated into this note.    Electronically signed by: Chiki Schmidt M.D., 12/9/2020 4:45 PM

## 2021-01-15 DIAGNOSIS — Z23 NEED FOR VACCINATION: ICD-10-CM

## 2021-01-27 ENCOUNTER — HOSPITAL ENCOUNTER (OUTPATIENT)
Dept: LAB | Facility: MEDICAL CENTER | Age: 81
End: 2021-01-27
Attending: INTERNAL MEDICINE
Payer: COMMERCIAL

## 2021-01-27 PROCEDURE — 84207 ASSAY OF VITAMIN B-6: CPT

## 2021-01-27 PROCEDURE — 36415 COLL VENOUS BLD VENIPUNCTURE: CPT

## 2021-01-28 ENCOUNTER — OFFICE VISIT (OUTPATIENT)
Dept: NEUROLOGY | Facility: MEDICAL CENTER | Age: 81
End: 2021-01-28
Attending: PSYCHIATRY & NEUROLOGY
Payer: COMMERCIAL

## 2021-01-28 VITALS
SYSTOLIC BLOOD PRESSURE: 106 MMHG | HEIGHT: 62 IN | OXYGEN SATURATION: 95 % | HEART RATE: 75 BPM | BODY MASS INDEX: 21.18 KG/M2 | DIASTOLIC BLOOD PRESSURE: 74 MMHG | TEMPERATURE: 97.8 F | RESPIRATION RATE: 15 BRPM | WEIGHT: 115.08 LBS

## 2021-01-28 DIAGNOSIS — M48.062 SPINAL STENOSIS OF LUMBAR REGION WITH NEUROGENIC CLAUDICATION: ICD-10-CM

## 2021-01-28 PROCEDURE — 99213 OFFICE O/P EST LOW 20 MIN: CPT | Performed by: PSYCHIATRY & NEUROLOGY

## 2021-01-28 PROCEDURE — 99212 OFFICE O/P EST SF 10 MIN: CPT | Performed by: PSYCHIATRY & NEUROLOGY

## 2021-01-28 ASSESSMENT — PATIENT HEALTH QUESTIONNAIRE - PHQ9: CLINICAL INTERPRETATION OF PHQ2 SCORE: 0

## 2021-01-28 ASSESSMENT — ENCOUNTER SYMPTOMS
TINGLING: 1
BACK PAIN: 1
MEMORY LOSS: 0

## 2021-01-28 ASSESSMENT — FIBROSIS 4 INDEX: FIB4 SCORE: 1.61

## 2021-01-29 NOTE — PROGRESS NOTES
"Subjective:      Antonio Treviño is a 80 y.o. female who presents for follow-up, with a history of lumbosacral stenosis and known bilateral radiculopathy, now status post decompression on May 29, 2020, but who symptoms have remained problematic with little benefit.     HPI    Ms. Treviño states that the lower extremity pain and paresthesias really remain at about the same level.  More problematic on the right side, she still describes radiation down the anterior medial thigh and calf into the foot on both sides.  This occurs when she does stand and walk, though evidently she can do so for a bit longer before things act up.  There is no real weakness other than that associated with the pain and sensory distortions when they occur.  Bowel and bladder function are not curtailed.  There is no loss of sensation.    Under the care of Dr. Marcellus Land MD, the surgery itself was uneventful.  Imaging studies from August, 2020, when compared to the preoperative studies from November, 2019, were unchanged.  There were multiple levels of foraminal narrowing but without nerve root impingement.  There was a chronic L2 vertebral body compression fracture with little instability demonstrated.  The postoperative L2/3 through L4/5 postoperative changes were noted.    Symptomatically, she is scheduled for epidural steroid infusions, EMG/NCV studies were requested.    Medical, surgical and family histories are reviewed, there are no new drug allergies.  She is on vitamin D with calcium, Tylenol and Motrin as needed and melatonin.    Review of Systems   Musculoskeletal: Positive for back pain.   Neurological: Positive for tingling.   Psychiatric/Behavioral: Negative for memory loss.   All other systems reviewed and are negative.       Objective:     /74 (BP Location: Left arm)   Pulse 75   Temp 36.6 °C (97.8 °F) (Temporal)   Resp 15   Ht 1.575 m (5' 2\")   Wt 52.2 kg (115 lb 1.3 oz)   SpO2 95%   BMI 21.05 kg/m²      Physical " Exam    She appears in no acute distress.  Her vital signs are stable.  There is no malar rash.  The neck is supple.  Cardiac evaluation reveals a regular rhythm.  Straight leg raising is negative bilaterally, even with forced dorsiflexion of the feet.    Mental status and cranial nerve exams are grossly intact.    Musculoskeletal exam reveals normal tone in the upper extremities, there is no tremor or drift.  In the lower extremities, strength is intact throughout.    Reflexes are present in the lower extremities, though there is a slight left sided predominance.  The toes are downgoing bilaterally.    Repetitive movements are intact and symmetric in both lower extremities.  She stands easily, though there is some stiffness.  She does not limp as she walks, heel and toe walking are normal.    Sensory exam is intact to vibration, temperature and pinprick bilaterally.     Assessment/Plan:     1. Spinal stenosis of lumbar region with neurogenic claudication  For now, I am going to be sitting in the backseat, she seems to be under good care with Dr. Land and with whom I suspect is Dr. Fabien Schaeffer MD, their pain specialist.  Hopefully the epidural steroid infusions will be helpful.  I will order the EMG/NCV studies.  She and I can follow-up into the future as needed.    - REFERRAL TO NEURODIAGNOSTICS (EEG,EP,EMG/NCS/DBS)    Time: 20-minute spent face-to-face for exam, review, discussion, and education, of this over 50% of the time spent counseling and coordinating care.

## 2021-01-30 LAB — VIT B6 SERPL-MCNC: 357.6 NMOL/L (ref 20–125)

## 2021-02-03 ENCOUNTER — NON-PROVIDER VISIT (OUTPATIENT)
Dept: NEUROLOGY | Facility: MEDICAL CENTER | Age: 81
End: 2021-02-03
Attending: PSYCHIATRY & NEUROLOGY
Payer: COMMERCIAL

## 2021-02-03 DIAGNOSIS — M54.16 BILATERAL LUMBAR RADICULOPATHY: ICD-10-CM

## 2021-02-03 DIAGNOSIS — M48.062 SPINAL STENOSIS OF LUMBAR REGION WITH NEUROGENIC CLAUDICATION: ICD-10-CM

## 2021-02-03 PROCEDURE — 95909 NRV CNDJ TST 5-6 STUDIES: CPT | Performed by: PSYCHIATRY & NEUROLOGY

## 2021-02-03 PROCEDURE — 95909 NRV CNDJ TST 5-6 STUDIES: CPT | Mod: 26 | Performed by: PSYCHIATRY & NEUROLOGY

## 2021-02-03 PROCEDURE — 95886 MUSC TEST DONE W/N TEST COMP: CPT | Performed by: PSYCHIATRY & NEUROLOGY

## 2021-02-03 PROCEDURE — 99999 PR NO CHARGE: CPT | Performed by: PSYCHIATRY & NEUROLOGY

## 2021-02-03 PROCEDURE — 95886 MUSC TEST DONE W/N TEST COMP: CPT | Mod: 26 | Performed by: PSYCHIATRY & NEUROLOGY

## 2021-02-03 NOTE — PROCEDURES
"NERVE CONDUCTION STUDIES AND ELECTROMYOGRAPHY REPORT  [facility]  02/03/21          IMPRESSION:  This is an abnormal electrodiagnostic study due to chronic inactive reinnervation changes in the left vastus medialis/lateralis which can be seen in an old L2-4 lumbosacral radiculopathy but in itself is not diagnostic.  There is no evidence of a large fiber peripheral neuropathy in the lower extremities or right lumbosacral radiculopathy.  Compared to prior October 2019 study, there are no significant changes.    Magdlaena Quintero MD  Neurology - Neurophysiology  [facility]        REASON FOR REFERRAL:  Ms. Antonio Treviño 80 y.o. referred by Dr. Renaldo Cook for evaluation of persistent lower extremity pain and paresthesias that have not improved with lumbar spine intervention.  Patient is experiencing radiation of pain down the anterior medial thigh and calf into the feet, worse on the right.  No associated weakness or numbness.    Height: 5'2\"  Weight: 110 lbs    Symptom focused neurological exam shows normal strength in the bilateral lower extremities.  Bilateral patellar reflexes are 2+.  Patient has decreased sensation to light touch on the sole of the feet and lateral and medial aspects of the foot.       ELECTRODIAGNOSTIC EXAMINATION:  Nerve conduction studies (NCS) and electromyography (EMG) are utilized to evaluate direct or indirect damage to the peripheral nervous system. NCS are performed to measure the nerve(s) response(s) to electrostimulation across a given nerve segment. EMG evaluates the passive and active electrical activity of the muscle(s) in question.  Muscles are innervated by specific peripheral nerves and roots. Often times, several nerves the muscle to be examined in order to determine the presence or absence of the disease process. Furthermore, nerves and muscles may need to be tested in a [id number] comparison, as well as in additional extremities, as this " may be crucial in characterizing the extent of the disease process, which may be diffuse or isolated and of varying degree of severity. The extent of the neurodiagnostic exam is justified as it may help arrive to a proper diagnosis, which ultimately may contribute to better management of the patient. Therefore, the nerves to muscles examined during the study were medically necessary.    Unless otherwise noted, temperature of the extremity(s) study was monitored before and during the examination and remained between 32 and 36 degrees C for the upper extremities, and between 30 and 36 degrees C for the lower extremities. The patient tolerated testing well, without any complications.       NERVE CONDUCTION STUDY SUMMARY:  Selected nerves of the bilateral lower extremity are studied.    Normal bilateral sural sensory responses.  Unchanged compared to prior.  Normal bilateral common peroneal motor responses at the extensor digitorum brevis.  Bilateral F waves are unobtainable.  Unchanged compared to prior.  Normal bilateral tibial motor responses at the abductor hallucis brevis.  Bilateral F waves are within normal limits.  Compared to prior study, amplitudes are decreased.  This can be technical in nature.    NEEDLE EMG SUMMARY:  Concentric needle study of selected bilateral lower extremity muscles is performed.     Insertion activity is normal in all muscles sampled.   Mildly large amplitude prolonged duration motor unit potentials are appreciated in the left vastus medialis/lateralis.  Otherwise with activation, there are normal morphology (amplitude/duration) motor unit action potentials firing with normal recruitment in remaining muscles tested.       PATIENT DATA TABLES  Nerve Conduction Studies     Stim Site NR Onset (ms) Norm Onset (ms) O-P Amp (µV) Norm O-P Amp Site1 Site2 Delta-P (ms) Dist (cm) Andrae (m/s) Norm Andrae (m/s)   Left Sural Anti Sensory (Lat Mall)   Calf    2.9 <4.6 4.1 >3 Calf Lat Mall 3.5 14.0 40 >40    Right Sural Anti Sensory (Lat Mall)   Calf    2.9 <4.6 6.7 >3 Calf Lat Mall 3.5 14.0 40 >40        Stim Site NR Onset (ms) Norm Onset (ms) O-P Amp (mV) Norm O-P Amp Site1 Site2 Delta-0 (ms) Dist (cm) Andrae (m/s) Norm Andrae (m/s)   Left Peroneal EDB Motor (Ext Dig Brev)   Ankle    3.9 <6 4.0 >2.5 B Fib Ankle 6.4 28.0 44 >40   B Fib    10.3  3.9  Poplt B Fib 1.4 10.0 71    Poplt    11.7  3.9          Right Peroneal EDB Motor (Ext Dig Brev)   Ankle    4.4 <6 3.2 >2.5 B Fib Ankle 6.7 28.0 42 >40   B Fib    11.1  2.7  Poplt B Fib 1.6 10.0 63    Poplt    12.7  2.2          Left Tibial Motor (Abd Yu Brev)   Ankle    5.0 <6 6.4 >4 Knee Ankle 5.2 33.0 63 >40   Knee    10.2  5.3          Right Tibial Motor (Abd Yu Brev)   Ankle    4.6 <6 9.4 >4 Knee Ankle 7.9 32.0 41 >40   Knee    12.5  8.0            F Wave Studies     NR F-Lat (ms) Lat Norm (ms)   Left Peroneal EDB (Ext Dig Brev)   *NR  <57   Right Peroneal EDB (Ext Dig Brev)   *NR  <57   Left Tibial (Abd Hallucis)      45.47 <57   Right Tibial (Abd Hallucis)      47.09 <57                               Electromyography     Side Muscle Nerve Root Ins Act Fibs Psw Amp Dur Poly Recrt Int Pat Comment   Right AntTibialis Dp Br Fibular L4-5 Nml Nml Nml Nml Nml 0 Nml Nml    Right Gastroc Tibial S1-2 Nml Nml Nml Nml Nml 0 Nml Nml    Right VastusLat Femoral L2-4 Nml Nml Nml Nml Nml 0 Nml Nml    Right GluteusMed SupGluteal L5-S1 Nml Nml Nml Nml Nml 0 Nml Nml    Right Lumbo Parasp Low Rami L5-S1 Nml Nml Nml         Left AntTibialis Dp Br Fibular L4-5 Nml Nml Nml Nml Nml 0 Nml Nml    Left Gastroc Tibial S1-2 Nml Nml Nml Nml Nml 0 Nml Nml    Left VastusLat Femoral L2-4 Nml Nml Nml Nml Nml 0 Nml Nml    Left GluteusMed SupGluteal L5-S1 Nml Nml Nml *Incr *>12ms 0 Nml Nml    Left VastusMed Femoral L2-4 Nml Nml Nml *Incr *>12ms 0 Nml Nml    Right VastusMed Femoral L2-4 Nml Nml Nml Nml Nml 0 Nml Nml

## 2021-05-03 ENCOUNTER — HOSPITAL ENCOUNTER (OUTPATIENT)
Dept: LAB | Facility: MEDICAL CENTER | Age: 81
End: 2021-05-03
Attending: INTERNAL MEDICINE
Payer: COMMERCIAL

## 2021-05-03 DIAGNOSIS — E55.9 VITAMIN D DEFICIENCY: ICD-10-CM

## 2021-05-03 LAB
CA-I SERPL-SCNC: 1.2 MMOL/L (ref 1.1–1.3)
CALCIUM SERPL-MCNC: 9.6 MG/DL (ref 8.5–10.5)
PHOSPHATE SERPL-MCNC: 3.3 MG/DL (ref 2.5–4.5)
PTH-INTACT SERPL-MCNC: 19.9 PG/ML (ref 14–72)

## 2021-05-03 PROCEDURE — 82330 ASSAY OF CALCIUM: CPT

## 2021-05-03 PROCEDURE — 83970 ASSAY OF PARATHORMONE: CPT

## 2021-05-03 PROCEDURE — 82306 VITAMIN D 25 HYDROXY: CPT

## 2021-05-03 PROCEDURE — 36415 COLL VENOUS BLD VENIPUNCTURE: CPT

## 2021-05-03 PROCEDURE — 84100 ASSAY OF PHOSPHORUS: CPT

## 2021-05-04 LAB — 25(OH)D3 SERPL-MCNC: 84 NG/ML (ref 30–80)

## 2021-05-25 ENCOUNTER — HOSPITAL ENCOUNTER (OUTPATIENT)
Dept: RADIOLOGY | Facility: MEDICAL CENTER | Age: 81
End: 2021-05-25
Attending: INTERNAL MEDICINE
Payer: COMMERCIAL

## 2021-05-25 DIAGNOSIS — M80.80XD LOCALIZED OSTEOPOROSIS WITH CURRENT PATHOLOGICAL FRACTURE WITH ROUTINE HEALING, SUBSEQUENT ENCOUNTER: ICD-10-CM

## 2021-05-25 PROCEDURE — 77080 DXA BONE DENSITY AXIAL: CPT

## 2021-06-23 ENCOUNTER — APPOINTMENT (OUTPATIENT)
Dept: ENDOCRINOLOGY | Facility: MEDICAL CENTER | Age: 81
End: 2021-06-23
Attending: INTERNAL MEDICINE
Payer: COMMERCIAL

## 2021-07-06 ENCOUNTER — HOSPITAL ENCOUNTER (OUTPATIENT)
Dept: LAB | Facility: MEDICAL CENTER | Age: 81
End: 2021-07-06
Attending: INTERNAL MEDICINE
Payer: COMMERCIAL

## 2021-07-06 LAB
25(OH)D3 SERPL-MCNC: 76 NG/ML (ref 30–100)
ALBUMIN SERPL BCP-MCNC: 4.3 G/DL (ref 3.2–4.9)
ALBUMIN/GLOB SERPL: 1.5 G/DL
ALP SERPL-CCNC: 80 U/L (ref 30–99)
ALT SERPL-CCNC: 13 U/L (ref 2–50)
ANION GAP SERPL CALC-SCNC: 9 MMOL/L (ref 7–16)
AST SERPL-CCNC: 16 U/L (ref 12–45)
BILIRUB SERPL-MCNC: 0.5 MG/DL (ref 0.1–1.5)
BUN SERPL-MCNC: 19 MG/DL (ref 8–22)
CALCIUM SERPL-MCNC: 9.2 MG/DL (ref 8.5–10.5)
CHLORIDE SERPL-SCNC: 101 MMOL/L (ref 96–112)
CHOLEST SERPL-MCNC: 258 MG/DL (ref 100–199)
CO2 SERPL-SCNC: 25 MMOL/L (ref 20–33)
CREAT SERPL-MCNC: 0.66 MG/DL (ref 0.5–1.4)
FASTING STATUS PATIENT QL REPORTED: NORMAL
GLOBULIN SER CALC-MCNC: 2.9 G/DL (ref 1.9–3.5)
GLUCOSE SERPL-MCNC: 102 MG/DL (ref 65–99)
HDLC SERPL-MCNC: 63 MG/DL
LDLC SERPL CALC-MCNC: 181 MG/DL
POTASSIUM SERPL-SCNC: 4.1 MMOL/L (ref 3.6–5.5)
PROT SERPL-MCNC: 7.2 G/DL (ref 6–8.2)
SODIUM SERPL-SCNC: 135 MMOL/L (ref 135–145)
TRIGL SERPL-MCNC: 72 MG/DL (ref 0–149)
TSH SERPL DL<=0.005 MIU/L-ACNC: 4.54 UIU/ML (ref 0.38–5.33)

## 2021-07-06 PROCEDURE — 36415 COLL VENOUS BLD VENIPUNCTURE: CPT

## 2021-07-06 PROCEDURE — 80061 LIPID PANEL: CPT

## 2021-07-06 PROCEDURE — 82306 VITAMIN D 25 HYDROXY: CPT

## 2021-07-06 PROCEDURE — 84443 ASSAY THYROID STIM HORMONE: CPT

## 2021-07-06 PROCEDURE — 80053 COMPREHEN METABOLIC PANEL: CPT

## 2021-08-06 ENCOUNTER — HOSPITAL ENCOUNTER (OUTPATIENT)
Dept: LAB | Facility: MEDICAL CENTER | Age: 81
End: 2021-08-06
Attending: GENERAL PRACTICE
Payer: COMMERCIAL

## 2021-08-06 LAB
EST. AVERAGE GLUCOSE BLD GHB EST-MCNC: 120 MG/DL
HBA1C MFR BLD: 5.8 % (ref 4–5.6)
VIT B12 SERPL-MCNC: 800 PG/ML (ref 211–911)

## 2021-08-06 PROCEDURE — 83036 HEMOGLOBIN GLYCOSYLATED A1C: CPT

## 2021-08-06 PROCEDURE — 84207 ASSAY OF VITAMIN B-6: CPT

## 2021-08-06 PROCEDURE — 82525 ASSAY OF COPPER: CPT

## 2021-08-06 PROCEDURE — 84165 PROTEIN E-PHORESIS SERUM: CPT

## 2021-08-06 PROCEDURE — 84155 ASSAY OF PROTEIN SERUM: CPT

## 2021-08-06 PROCEDURE — 36415 COLL VENOUS BLD VENIPUNCTURE: CPT

## 2021-08-06 PROCEDURE — 82607 VITAMIN B-12: CPT

## 2021-08-09 ENCOUNTER — HOSPITAL ENCOUNTER (OUTPATIENT)
Dept: RADIOLOGY | Facility: MEDICAL CENTER | Age: 81
End: 2021-08-09
Attending: NURSE PRACTITIONER
Payer: COMMERCIAL

## 2021-08-09 DIAGNOSIS — M48.062 SPINAL STENOSIS, LUMBAR REGION, WITH NEUROGENIC CLAUDICATION: ICD-10-CM

## 2021-08-09 LAB
ALBUMIN SERPL ELPH-MCNC: 4.17 G/DL (ref 3.75–5.01)
ALPHA1 GLOB SERPL ELPH-MCNC: 0.24 G/DL (ref 0.19–0.46)
ALPHA2 GLOB SERPL ELPH-MCNC: 0.61 G/DL (ref 0.48–1.05)
B-GLOBULIN SERPL ELPH-MCNC: 0.67 G/DL (ref 0.48–1.1)
COPPER SERPL-MCNC: 139.2 UG/DL (ref 80–155)
GAMMA GLOB SERPL ELPH-MCNC: 1.12 G/DL (ref 0.62–1.51)
INTERPRETATION SERPL IFE-IMP: NORMAL
MONOCLON BAND OBS SERPL: NORMAL
PATHOLOGY STUDY: NORMAL
PROT SERPL-MCNC: 6.8 G/DL (ref 6.3–8.2)

## 2021-08-09 PROCEDURE — 72110 X-RAY EXAM L-2 SPINE 4/>VWS: CPT

## 2021-08-10 ENCOUNTER — APPOINTMENT (OUTPATIENT)
Dept: RADIOLOGY | Facility: MEDICAL CENTER | Age: 81
End: 2021-08-10
Attending: NURSE PRACTITIONER
Payer: COMMERCIAL

## 2021-08-10 DIAGNOSIS — M48.062 SPINAL STENOSIS, LUMBAR REGION, WITH NEUROGENIC CLAUDICATION: ICD-10-CM

## 2021-08-10 PROCEDURE — 72148 MRI LUMBAR SPINE W/O DYE: CPT

## 2021-08-11 LAB — VIT B6 SERPL-MCNC: 101.9 NMOL/L (ref 20–125)

## 2022-03-01 ENCOUNTER — HOSPITAL ENCOUNTER (OUTPATIENT)
Dept: RADIOLOGY | Facility: MEDICAL CENTER | Age: 82
End: 2022-03-01
Attending: SPECIALIST
Payer: COMMERCIAL

## 2022-03-01 DIAGNOSIS — R20.2 PARESTHESIA: ICD-10-CM

## 2022-03-01 PROCEDURE — 72146 MRI CHEST SPINE W/O DYE: CPT

## 2022-03-01 PROCEDURE — 72141 MRI NECK SPINE W/O DYE: CPT

## 2022-03-07 ENCOUNTER — HOSPITAL ENCOUNTER (OUTPATIENT)
Dept: RADIOLOGY | Facility: MEDICAL CENTER | Age: 82
End: 2022-03-07
Attending: SPECIALIST
Payer: COMMERCIAL

## 2022-03-07 DIAGNOSIS — M48.061 SPINAL STENOSIS, LUMBAR REGION, WITHOUT NEUROGENIC CLAUDICATION: ICD-10-CM

## 2022-03-07 PROCEDURE — 72148 MRI LUMBAR SPINE W/O DYE: CPT

## 2022-04-08 ENCOUNTER — PHYSICAL THERAPY (OUTPATIENT)
Dept: PHYSICAL THERAPY | Facility: MEDICAL CENTER | Age: 82
End: 2022-04-08
Attending: SPECIALIST
Payer: COMMERCIAL

## 2022-04-08 DIAGNOSIS — M54.50 LOW BACK PAIN, UNSPECIFIED BACK PAIN LATERALITY, UNSPECIFIED CHRONICITY, UNSPECIFIED WHETHER SCIATICA PRESENT: ICD-10-CM

## 2022-04-08 PROCEDURE — 97110 THERAPEUTIC EXERCISES: CPT

## 2022-04-08 PROCEDURE — 97162 PT EVAL MOD COMPLEX 30 MIN: CPT

## 2022-04-08 SDOH — ECONOMIC STABILITY: GENERAL: QUALITY OF LIFE: FAIR

## 2022-04-08 ASSESSMENT — ENCOUNTER SYMPTOMS: PAIN SCALE AT HIGHEST: 6

## 2022-04-08 NOTE — OP THERAPY EVALUATION
Outpatient Physical Therapy  INITIAL EVALUATION    [facility] Outpatient Physical Therapy  [address]  [place] [address]  Phone:  [phone]  Fax:  [phone]    Date of Evaluation: 2022    Patient: Antonio Treviño  YOB: 1940  MRN: [id number]     Referring Provider: EVELYNE Osborn M.D.  [address]  [address]  [place] [address]   Referring Diagnosis Low back pain, unspecified [M54.50]     Time Calculation  Start time: 920  Stop time: 1000 Time Calculation (min): 40 minutes         Chief Complaint: Back Problem    Visit Diagnoses     ICD-10-CM   1. Low back pain, unspecified back pain laterality, unspecified chronicity, unspecified whether sciatica present  M54.50       Date of onset of impairment: No data found    Subjective:   History of Present Illness:     Onset date: chronic.    Mechanism of injury:  Patient has history of chronic bilat LE numbness and tingling that radiates into her toes. She failed conservative treatment and she underwent laminectomy in 2020 with no resolution. She sought out neurologist, by her report, he told her that nerve symptoms will probably not resolve since they were there before surgery and he referred her to PT  Quality of life:  Fair  Sleep disturbance:  Non-restful sleep  Pain:     At worst pain ratin    Location:  Bilat legs (ant and post), especially ankles/ feet and pain in toes    Pain Comments::  Aggravating factors   1) Sleeping - able to sleep 4 hours but then wakes up d/t symptoms,  2) Walking on ground 10-15 minutes, walking in pool 30 minutes, sitting for > 20-30 minutes increases ankle/foot numbness    Easing factors:  Hot tub and walking for 30-40 minutes in pool eases symptoms     Is taking gabapentin, having dizzy symptoms, adjusted to taking at night time per MD. Not sure if it is helping or not.   Diagnostic Tests:     MRI studies: abnormal      Diagnostic Tests Comments:  Thoracic  spine:  FINDINGS:  The thoracic spine maintains normal height and alignment. There is no pathologic marrow infiltration. There is no focal osseous lesion. There is no perivertebral abnormality     There is no epidural lesion. There is no intradural extramedullary lesion. There is no abnormal intramedullary T2 signal intensity in the thoracic spinal cord. The visualized pre and paraspinal soft tissues are unremarkable.     The localization images demonstrates chronic compression deformity at L2 with loss of L1-2 disc space. There is central canal stenosis at the L1-2.     IMPRESSION:     1.  There is no central canal stenosis at thoracic spine.  2.  The localization images demonstrates chronic compression deformity at the L2 with central canal stenosis at the L1-2. The finding appears to be progressed since the previous MRI dated 8/10/2021. MRI examination of the lumbar spine is recommended for   further assessment if clinically indicated.  Lumbar spine FINDINGS:     5 lumbar type vertebral bodies are counted. Conus medullaris terminates at T12-L1 and is normal in signal.     Stable chronic moderate L2 compression fracture with mild bony retropulsion. No acute osseous abnormality. There is an intraosseous hemangioma in the L5 vertebral body. The alignment is stable from prior.     Multilevel disc and facet degeneration again noted.     T12-L1:  Canal and foramina are patent.  L1-L2:  Posterior retropulsion of the L2 fracture and diffuse disc bulge, osteophytes. Mild right facet degeneration. Mild spinal stenosis is unchanged. Stable mild right foraminal narrowing.  L2-L3:  Slight retrolisthesis and mild disc bulge. Mild facet degeneration. Mild left foraminal narrowing. No significant spinal stenosis.  L3-L4:  Laminectomies. Moderate facet degeneration. Stable slight anterolisthesis. Disc narrowing, is disc bulge, osteophytes, small chronic right paracentral disc protrusion and annular fissure. No spinal stenosis. At  "least moderate left foraminal   narrowing is stable.  L4-L5:  Laminectomies. Severe facet degeneration. Diffuse disc bulge, osteophytes. No spinal stenosis. Stable moderate foraminal narrowing.  L5-S1:  Right facet degeneration. No spinal stenosis. Mild foraminal narrowing.           IMPRESSION:     1.  Stable chronic L2 moderate compression fracture. No acute abnormality.  2.  Stable postoperative changes L3-L5 laminectomies.  3.  Stable multilevel disc and facet degeneration as detailed above.  4.  Moderate foraminal narrowing bilaterally at L4-L5, at least moderate left L3-L4.  5.  Mild spinal stenosis L1-L2.  Treatments:     Previous treatment:  Physical therapy and injection treatment  Activities of Daily Living:     Patient reported ADL status: Patient walks in pool and does back stretches most days (SKTC, DKTC, gastroc and hamstring stretch)  Patient Goals:     Other patient goals:  Decrease intensity of numbness and tingling in bilat LEs      Past Medical History:   Diagnosis Date   • Anesthesia     nausea   \"no narcotics\"   • Bowel habit changes     constipation   • Cataract     bilateral IOL   • Dry eyes    • Heart murmur    • High cholesterol 11/02/2020    no medication   • Hyperlipidemia    • Osteopenia    • OSTEOPOROSIS    • Vitiligo      Past Surgical History:   Procedure Laterality Date   • HYSTERECTOMY LAPAROSCOPY  11/11/2020    Procedure: HYSTERECTOMY, LAPAROSCOPIC-TOTAL;  Surgeon: Max Villatoro M.D.;  Location: SURGERY SAME DAY [facility];  Service: Gynecology   • SALPINGO OOPHORECTOMY Bilateral 11/11/2020    Procedure: SALPINGO-OOPHORECTOMY;  Surgeon: Max Villatoro M.D.;  Location: SURGERY SAME DAY [facility];  Service: Gynecology   • CYSTOSCOPY  11/11/2020    Procedure: CYSTOSCOPY-DIAGNOSTIC;  Surgeon: Max Villatoro M.D.;  Location: SURGERY SAME DAY [facility];  Service: Gynecology   • OTHER  2020    laminectomy   • CATARACT PHACO WITH IOL  7/2/2013    Performed by Bin Cardona M.D. " at [facility]   • TENDON TRANSFER       Social History     Tobacco Use   • Smoking status: Never Smoker   • Smokeless tobacco: Never Used   Substance Use Topics   • Alcohol use: No     Family and Occupational History     Socioeconomic History   • Marital status:      Spouse name: Not on file   • Number of children: Not on file   • Years of education: Not on file   • Highest education level: Not on file   Occupational History   • Not on file       Objective     Observations     Additional Observation Details  Gait - slow addy, decreased step length, holds arms at side, minimal trunk rotation     Palpation   Left   No palpable tenderness to the quadratus lumborum.     Right   No palpable tenderness to the quadratus lumborum.     Additional Palpation Details  No TTP bilat paraspinals     Active Range of Motion     Lumbar   Flexion: within functional limits  Extension: decreased  Left lateral flexion: within functional limits  Right lateral flexion: within functional limits  Left rotation: decreased  Right rotation: decreased    Additional Active Range of Motion Details  Flexion, rotation, and side bending increase foot/ankle pain on return to neutral     Tests     Additional Tests Details  SLR positive bilat for increased tingling in feet     Bilat hamstring tightness  Gastroc length R -5 deg, L 3 deg   Erma neg   Hip ROM WFL     General Comments     Spine Comments   Neuro testing, gait speed, and assess hip strength at next visit         Therapeutic Exercises (CPT 70483):     1. Hooklying sciatic nerve glides , 5x bilat      Time-based treatments/modalities:    Physical Therapy Timed Treatment Charges  Therapeutic exercise minutes (CPT 59139): 10 minutes      Assessment, Response and Plan:   Impairments: abnormal gait, impaired functional mobility and limited mobility    Assessment details:  Patient presents with chronic history of low back pain with radicular symptoms in bilat LEs. Has  history of spinal surgery with no change in symptoms. She presents to therapy with impaired gait, trunk mobility, and high irritability with neurodynamic testing. She would benefit from skilled therapy to address impairments, instruction in body mechanics, and home exercise programming.   Barriers to therapy:  Poorly tolerated treatments  Prognosis: fair    Goals:   Short Term Goals:   Patient will demonstrate independence with HEP 3x/wk   Patient will be able to walk 15 minutes with < 3/10 radicular symptoms   Short term goal time span:  2-4 weeks      Long Term Goals:    Patient will demonstrate gait speed equal to age-related peers   Patient will demonstrate safe body mechanics with kneeling tasks in clinic   Patient will be able to garden for 30 minutes without increase in radicular symptoms   Long term goal time span:  6-8 weeks    Plan:   Planned therapy interventions:  Manual Therapy (CPT 64903), Mechanical Traction (CPT 36018), Neuromuscular Re-education (CPT 98920) and Therapeutic Exercise (CPT 45684)  Frequency:  2x week  Duration in weeks:  8  Duration in visits:  16  Discussed with:  Patient      Functional Assessment Used  Bernardo Walt Low Back Pain and Disability Score: 29.17     Referring provider co-signature:  I have reviewed this plan of care and my co-signature certifies the need for services.    Certification Period: 04/08/2022 to  07/07/22    Physician Signature: ________________________________ Date: ______________

## 2022-04-11 ENCOUNTER — PHYSICAL THERAPY (OUTPATIENT)
Dept: PHYSICAL THERAPY | Facility: MEDICAL CENTER | Age: 82
End: 2022-04-11
Attending: SPECIALIST
Payer: COMMERCIAL

## 2022-04-11 DIAGNOSIS — M54.50 LOW BACK PAIN, UNSPECIFIED BACK PAIN LATERALITY, UNSPECIFIED CHRONICITY, UNSPECIFIED WHETHER SCIATICA PRESENT: ICD-10-CM

## 2022-04-11 PROCEDURE — 97110 THERAPEUTIC EXERCISES: CPT

## 2022-04-11 PROCEDURE — 97140 MANUAL THERAPY 1/> REGIONS: CPT

## 2022-04-11 NOTE — OP THERAPY DAILY TREATMENT
Outpatient Physical Therapy  DAILY TREATMENT     [facility] Outpatient Physical Therapy  [address]  [place] [address]  Phone:  [phone]  Fax:  [phone]    Date: 04/11/2022    Patient: Antonio Treviño  YOB: 1940  MRN: [id number]     Time Calculation    Start time: 0920  Stop time: 1000 Time Calculation (min): 40 minutes         Chief Complaint: Back Problem    Visit #: 2    SUBJECTIVE: Nothing new to report    OBJECTIVE:  Current objective measures: R hip abduction 4+5, L hip abduction 4+/5, bilat hip ER/IR (seated) 5/5          Therapeutic Exercises (CPT 51672):     1. Hooklying sciatic nerve glides , 5x bilat    2. Knee fall outs, 2 x 5 bilat    3. Bridge w/ TRA, 10x    4. Low trunk rotations , 2 x 3 reps    5. Katya hip add/abd, 5x     6. Standing gastroc/ hip flex stretch, 30 sec ea    7. 10 meter walk test, 0.71 m/s, no AD    17. New Goal: Increase 10 meter walk by 0.17 m/s    Therapeutic Treatments and Modalities:     1. Manual Therapy (CPT 13844), see below    Therapeutic Treatment and Modalities Summary: Manual: STW R thoracic paraspinals/ L lumbar paraspinals     Time-based treatments/modalities:    Physical Therapy Timed Treatment Charges  Manual therapy minutes (CPT 77569): 10 minutes  Therapeutic exercise minutes (CPT 75630): 30 minutes        ASSESSMENT:   Response to treatment: Increased foot tingling with most of therex and R low back discomfort with lying supine/ prone. Symptoms subside with cessation of task. Assessed hip strength with slightly impaired hip abduction strength (R > L). Also measured 10 meter walk test with patient scoring below norms for age group (norm = 0.94 m/s), added new goal for this.    PLAN/RECOMMENDATIONS:   Plan for treatment: therapy treatment to continue next visit.  Planned interventions for next visit: continue with current treatment.

## 2022-04-13 ENCOUNTER — PHYSICAL THERAPY (OUTPATIENT)
Dept: PHYSICAL THERAPY | Facility: MEDICAL CENTER | Age: 82
End: 2022-04-13
Attending: SPECIALIST
Payer: COMMERCIAL

## 2022-04-13 DIAGNOSIS — M54.50 LOW BACK PAIN, UNSPECIFIED BACK PAIN LATERALITY, UNSPECIFIED CHRONICITY, UNSPECIFIED WHETHER SCIATICA PRESENT: ICD-10-CM

## 2022-04-13 PROCEDURE — 97140 MANUAL THERAPY 1/> REGIONS: CPT

## 2022-04-13 PROCEDURE — 97110 THERAPEUTIC EXERCISES: CPT

## 2022-04-13 NOTE — OP THERAPY DAILY TREATMENT
Outpatient Physical Therapy  DAILY TREATMENT     [facility] Outpatient Physical Therapy  [address]  [place] [address]  Phone:  [phone]  Fax:  [phone]    Date: 04/13/2022    Patient: Antonio Treviño  YOB: 1940  MRN: [id number]     Time Calculation      0930  1015  45 minutes         Chief Complaint: Back Problem    Visit #: 3    SUBJECTIVE:  Nothing new to report    OBJECTIVE:  Current objective measures:na          Therapeutic Exercises (CPT 70134):     1. Hooklying sciatic nerve glides , 5x bilat    2. Knee fall outs, 2 x 5 bilat, NT    3. Bridge w/ TRA, 10x, NT    4. Low trunk rotations , 2 x 3 reps, NT    5. Katya hip add/abd, 5x , NT    6. True standing gastroc/ hip flex stretch, 3 x 10 sec     7. Nustep (S7, A8), 5 min, L2 (SPM 70-80), warm up + increasing SPM    8. Balance board AP balance /weight shift , 1 min ea, core stabilization, tendency to posterior pelvic tilt     9. Kate pose to side , 3 x 10 sec ea, address QL tightness    10. Stability ball marching , 3 x 10 , v/c and t/c to enhance erect posture/ pelvic stability    11. Anti rotation w/ blue Tband sport cord, 5 reps ea, v/c to inhibit rotation       Therapeutic Exercise Summary: HEP: LTR, side lying leg circles, kate pose w/ lateral stretch     New LT Goal (04/11/2022): LT Goal: Increase 10 meter walk by 0.17 m/s to match age-related peers     Therapeutic Treatments and Modalities:     1. Manual Therapy (CPT 41870), see below    Therapeutic Treatment and Modalities Summary: Manual: STW R QL    Time-based treatments/modalities:  Manual 74173 8 minutes  Therapeutic Exercise 70863 37 minutes         ASSESSMENT:   Response to treatment: Dec TTP and R iliac crest pain with STW to R QL. Addressed core and pelvic stability this visit. Foot tingling 3-5/10 with visit. Quickly decreases to baseline 3/10.     PLAN/RECOMMENDATIONS:   Plan for treatment: therapy treatment to continue next  visit.  Planned interventions for next visit: continue with current treatment.

## 2022-04-19 ENCOUNTER — PHYSICAL THERAPY (OUTPATIENT)
Dept: PHYSICAL THERAPY | Facility: MEDICAL CENTER | Age: 82
End: 2022-04-19
Attending: SPECIALIST
Payer: COMMERCIAL

## 2022-04-19 DIAGNOSIS — M54.50 LOW BACK PAIN, UNSPECIFIED BACK PAIN LATERALITY, UNSPECIFIED CHRONICITY, UNSPECIFIED WHETHER SCIATICA PRESENT: ICD-10-CM

## 2022-04-19 PROCEDURE — 97140 MANUAL THERAPY 1/> REGIONS: CPT

## 2022-04-19 PROCEDURE — 97110 THERAPEUTIC EXERCISES: CPT

## 2022-04-19 NOTE — OP THERAPY DAILY TREATMENT
Outpatient Physical Therapy  DAILY TREATMENT     [facility] Outpatient Physical Therapy  [address]  [place] [address]  Phone:  [phone]  Fax:  [phone]    Date: 04/19/2022    Patient: Antonio Treviño  YOB: 1940  MRN: [id number]     Time Calculation    Start time: 1045  Stop time: 1125 Time Calculation (min): 40 minutes         Chief Complaint: Back Problem    Visit #: 4    SUBJECTIVE:  Having 3/10 foot tingling on presentation. No change in symptoms with walking or sleeping    OBJECTIVE:  Current objective measures:   - Tendency to have moderate posterior pelvic tilt with standing posture          Therapeutic Exercises (CPT 05153):     1. Hooklying sciatic nerve glides , 5x bilat    2. Knee fall outs, 2 x 5 bilat, NT    3. Bridge w/ TRA, 10x, NT    4. Low trunk rotations , 2 x 3 reps, NT    5. Katya hip add/abd, 5x , NT    6. Standing gastroc/ hip flex stretch, 30 sec ea    7. Standing TrA activation , 10x     8. Balance board AP balance /weight shift , 1 min ea, core stabilization, tendency to posterior pelvic tilt     9. Kate pose to side , 3 x 10 sec ea, address QL tightness    10. Stability ball marching , 3 x 10 , v/c and t/c to enhance erect posture/ pelvic stability    11. Anti rotation w/ blue Tband sport cord, 5 reps ea-NT, v/c to inhibit rotation     12. Side lying leg circles , 2 x 10 ea    13. DKTC stretch, 1 min     17. New Goal: Increase 10 meter walk by 0.17 m/s      Therapeutic Exercise Summary: HEP: LTR, side lying leg circles, kate pose w/ lateral stretch     Patient education: Corrected posture to decrease posture pelvic tilt and align hips under shoulders. Pt able to adopt with cues to engage transverse abdominis.     New LT Goal (04/11/2022): LT Goal: Increase 10 meter walk by 0.17 m/s to match age-related peers         Therapeutic Treatments and Modalities:     1. Manual Therapy (CPT 69804), see below    Therapeutic Treatment  and Modalities Summary: Manual: STW R thoracic paraspinals/ L lumbar paraspinals     Time-based treatments/modalities:    Physical Therapy Timed Treatment Charges  Manual therapy minutes (CPT 59443): 8 minutes  Therapeutic exercise minutes (CPT 15584): 32 minutes      Pain rating (1-10) before treatment:  3  Pain rating (1-10) after treatment:  2    ASSESSMENT:   Response to treatment: Worked on lumbo pelvic dissociation on gym ball and in hooklying. Frequent cues needed to enhance anterior pelvic tilt. Continue to address this. Decreased foot tingling with flexion-based exercises.     PLAN/RECOMMENDATIONS:   Plan for treatment: therapy treatment to continue next visit.  Planned interventions for next visit: continue with current treatment.

## 2022-04-22 ENCOUNTER — PHYSICAL THERAPY (OUTPATIENT)
Dept: PHYSICAL THERAPY | Facility: MEDICAL CENTER | Age: 82
End: 2022-04-22
Attending: SPECIALIST
Payer: COMMERCIAL

## 2022-04-22 DIAGNOSIS — M54.50 LOW BACK PAIN, UNSPECIFIED BACK PAIN LATERALITY, UNSPECIFIED CHRONICITY, UNSPECIFIED WHETHER SCIATICA PRESENT: ICD-10-CM

## 2022-04-22 PROCEDURE — 97110 THERAPEUTIC EXERCISES: CPT

## 2022-04-22 NOTE — OP THERAPY DAILY TREATMENT
Outpatient Physical Therapy  DAILY TREATMENT     [facility] Outpatient Physical Therapy  [address]  [place] [address]  Phone:  [phone]  Fax:  [phone]    Date: 04/22/2022    Patient: Antonio Treviño  YOB: 1940  MRN: [id number]     Time Calculation    Start time: 1045  Stop time: 1130 Time Calculation (min): 45 minutes         Chief Complaint: Back Problem    Visit #: 5    SUBJECTIVE: Doing okay, 3/10 bilat foot tingling on presentation    OBJECTIVE:  Current objective measures: Increased thoracic kyphosis with standing posture          Therapeutic Exercises (CPT 48403):     1. NuSTep S7, A9, 6 min L3 (SPM )     2. FR marching , 1 min , tactile cues to enhance pelvic stability    3. FR alt UE elevation, bear hugs, flyes, 20x ea    4. Balance board AP balance , 3 min w/ alt UE elevation and UE diagonals    5. Balance board M/L balance , 1 min, inc R side back pain    6. Unilat LPD w/ cable machine , 2 x 10 15# (seated on gym ball)    7. Shuttle DL squats     8. Seated SKTC stretch, 2 x 30 sec, decreased R back symptoms    9. Kate pose to fwd and side , 2 x 10 sec ea    10. Lateral step w/ band over feet, 1 lap (20 feet)     11. Partial squats , 10x 7.5# KB, tactile cues to enhance hip hinge    12. Standing unilat rows, 2 x 10 15#, tactile cues to enhance scapular retraction    17. New Goal: Increase 10 meter walk by 0.17 m/s      Therapeutic Exercise Summary: HEP: LTR, side lying leg circles, kate pose w/ lateral stretch     Patient education: Corrected posture to decrease posture pelvic tilt and align hips under shoulders. Pt able to adopt with cues to engage transverse abdominis.     New LT Goal (04/11/2022): LT Goal: Increase 10 meter walk by 0.17 m/s to match age-related peers         Therapeutic Treatments and Modalities:     1. Manual Therapy (CPT 80869), see below    Therapeutic Treatment and Modalities Summary: Manual: STW R thoracic  paraspinals/ L lumbar paraspinals     Time-based treatments/modalities:    Physical Therapy Timed Treatment Charges  Therapeutic exercise minutes (CPT 56192): 45 minutes      Pain rating (1-10) before treatment:  3  Pain rating (1-10) after treatment:  3    ASSESSMENT:   Response to treatment: Worked on core/ pelvic stability with OC and CC tasks, including squats. She had increased R low back pain with med/lat balance on unstable surface, decreased with trunk flexion stretching. No increased foot tingling with session. Continued therapy recommended to address impaired posture, core stability, and leg strength to enhance function and gait.     PLAN/RECOMMENDATIONS:   Plan for treatment: therapy treatment to continue next visit.  Planned interventions for next visit: continue with current treatment.

## 2022-04-25 ENCOUNTER — PHYSICAL THERAPY (OUTPATIENT)
Dept: PHYSICAL THERAPY | Facility: MEDICAL CENTER | Age: 82
End: 2022-04-25
Attending: SPECIALIST
Payer: COMMERCIAL

## 2022-04-25 DIAGNOSIS — M54.50 LOW BACK PAIN, UNSPECIFIED BACK PAIN LATERALITY, UNSPECIFIED CHRONICITY, UNSPECIFIED WHETHER SCIATICA PRESENT: ICD-10-CM

## 2022-04-25 PROCEDURE — 97110 THERAPEUTIC EXERCISES: CPT

## 2022-04-25 NOTE — OP THERAPY DAILY TREATMENT
Outpatient Physical Therapy  DAILY TREATMENT     [facility] Outpatient Physical Therapy  [address]  [place] [address]  Phone:  [phone]  Fax:  [phone]    Date: 04/25/2022    Patient: Antonio Treviño  YOB: 1940  MRN: [id number]     Time Calculation    Start time: 1045  Stop time: 1127 Time Calculation (min): 42 minutes         Chief Complaint: Back Problem    Visit #: 6    SUBJECTIVE: She reports tingling might be a little bit better. R back pain is about the same.     OBJECTIVE:  Current objective measures: na          Therapeutic Exercises (CPT 27337):     1. NuSTep S7, A9, 5 min L3 (SPM )     2. 1/2 FR marching , 2 x 20 , tactile cues to enhance pelvic stability    3. 1/2 FR alt UE elevation, bear hugs, 20x ea    4. BOSU balance, 3 min    5. BoSU step up, 10x ea, inc R side back pain    6. Unilat LPD w/ cable machine , 2 x 10 15# (seated on gym ball)    7. Shuttle DL squats     8. Seated SKTC stretch, 2 x 30 sec    9. Supine DKTC , 2 x 30 sec    10. Standing alt hip abduction, 3 x 20 reps (pink band over feet)    11. Partial squats , 10x , external cue ith golf club to enhance hip hinge    12. Unilat rows seated on gym ball, 20x 15#, tactile cues to enhance scapular retraction    17. New Goal: Increase 10 meter walk by 0.17 m/s      Therapeutic Exercise Summary: HEP: LTR, side lying leg circles, kate pose w/ lateral stretch, standing alt hip abduction     Patient education: Corrected posture to decrease posture pelvic tilt and align hips under shoulders. Pt able to adopt with cues to engage transverse abdominis.     New LT Goal (04/11/2022): LT Goal: Increase 10 meter walk by 0.17 m/s to match age-related peers           Time-based treatments/modalities:    Physical Therapy Timed Treatment Charges  Therapeutic exercise minutes (CPT 37377): 42 minutes        ASSESSMENT:   Response to treatment: Increased pelvic stability with regressing to  1/2 FR today. Occasional LOB to R side with balance on BOSU likely due impaired lateral stability/ strength. Hip hinge with squats increased with external cues. Add squats next visit for leg strengthening.      PLAN/RECOMMENDATIONS:   Plan for treatment: therapy treatment to continue next visit.  Planned interventions for next visit: continue with current treatment.

## 2022-04-29 ENCOUNTER — PHYSICAL THERAPY (OUTPATIENT)
Dept: PHYSICAL THERAPY | Facility: MEDICAL CENTER | Age: 82
End: 2022-04-29
Attending: SPECIALIST
Payer: COMMERCIAL

## 2022-04-29 DIAGNOSIS — M54.50 LOW BACK PAIN, UNSPECIFIED BACK PAIN LATERALITY, UNSPECIFIED CHRONICITY, UNSPECIFIED WHETHER SCIATICA PRESENT: ICD-10-CM

## 2022-04-29 PROCEDURE — 97110 THERAPEUTIC EXERCISES: CPT

## 2022-04-29 NOTE — OP THERAPY DAILY TREATMENT
"  Outpatient Physical Therapy  DAILY TREATMENT     [facility] Outpatient Physical Therapy  [address]  [place] [address]  Phone:  [phone]  Fax:  [phone]    Date: 04/29/2022    Patient: Antonio Treviño  YOB: 1940  MRN: [id number]     Time Calculation    Start time: 1048  Stop time: 1130 Time Calculation (min): 42 minutes         Chief Complaint: Back Problem    Visit #: 7    SUBJECTIVE: She is doing a little better. She is able to sleep 5 hours (instead of 4 hours) before foot tingling wakes her up. She is also able to \"tolerate\" R low back pain without taking Aleve. She reports 3/10 foot tingling on presentation    OBJECTIVE:  Current objective measures: na          Therapeutic Exercises (CPT 99699):     1. NuSTep S7, A9, 6 min L3 (SPM )     2. FR marching , 2 x 20 , tactile cues to enhance pelvic stability    3. FR alt UE elevation, bear hugs, 20x ea    5. BoSU step up, next visit    6.  LPD w/ cable machine , 2 x 15 20# (seated on gym ball)    7. Shuttle DL squats , 30x 4 cords    8. Seated SKTC stretch, 2 x 30 sec    9. Fwd/lat kate pose stretch , 3 x 15 sec    10. Standing alt hip abduction, 3 x 20 reps (pink band over feet)-NT    11. Partial squats , 10x -NT, external cue with golf club to enhance hip hinge    12. Unilat rows seated on gym ball, 20x 15#-NT    13. Seated single knee opp shoulder stretch , 3 x 15 sec, dec foot tingling     14. BOSU exs:    15. Balance, 1 min    16. Balance w/ bilat UE elevation, 2 x 10 1# ball    17. Balance with trunk rotation, 2 x 5 reps 1# ball , R low back pain increased     18. 10 meter walk test, 0.71 m/s , foot tingling inc to 4/10     19. New Goal: Increase 10 meter walk by 0.17 m/s      Therapeutic Exercise Summary: HEP: LTR, side lying leg circles, kate pose w/ lateral stretch, standing alt hip abduction, seated SKOS stretch     Patient education: Discussed trial of short hike (20 min) w/ back " brace and using seated SKOS stretch when tingling increases            Time-based treatments/modalities:    Physical Therapy Timed Treatment Charges  Therapeutic exercise minutes (CPT 89169): 42 minutes        ASSESSMENT:   Response to treatment: No change in speed of 10 meter walk test from initial assessment. Slight increase in foot tingling (4/10). Symptoms decreased with seated stretching. Tactile and verbal cues needed with balance on unstable surface to facilitate erect posture.     PLAN/RECOMMENDATIONS:   Plan for treatment: therapy treatment to continue next visit.  Planned interventions for next visit: continue with current treatment.

## 2022-05-02 ENCOUNTER — PHYSICAL THERAPY (OUTPATIENT)
Dept: PHYSICAL THERAPY | Facility: MEDICAL CENTER | Age: 82
End: 2022-05-02
Attending: SPECIALIST
Payer: COMMERCIAL

## 2022-05-02 DIAGNOSIS — M54.50 LOW BACK PAIN, UNSPECIFIED BACK PAIN LATERALITY, UNSPECIFIED CHRONICITY, UNSPECIFIED WHETHER SCIATICA PRESENT: ICD-10-CM

## 2022-05-02 PROCEDURE — 97530 THERAPEUTIC ACTIVITIES: CPT

## 2022-05-02 PROCEDURE — 97110 THERAPEUTIC EXERCISES: CPT

## 2022-05-02 NOTE — OP THERAPY DAILY TREATMENT
Outpatient Physical Therapy  DAILY TREATMENT     [facility] Outpatient Physical Therapy  [address]  [place] [address]  Phone:  [phone]  Fax:  [phone]    Date: 05/02/2022    Patient: Antonio Treviño  YOB: 1940  MRN: [id number]     Time Calculation    Start time: 1050  Stop time: 1130 Time Calculation (min): 40 minutes         Chief Complaint: Back Problem    Visit #: 8    SUBJECTIVE: Patient reports she attempted a 1 hour hike and her R low back before very sore and it lasted all day. She reports bending forward to water plants in yard also increases pain.   OBJECTIVE:  Current objective measures: na      Therapeutic Exercises (CPT 72074):     1. NuSTep S7, A9, 6 min L3 (SPM )     2. Fwd/lat kate pose stretch , 3 x 15 sec    3. DKTC stretch, 1 min x4    4. Posterior pelvic tilts, 3 x 30 sec hold    5. BOSU step up, next visit    19. New Goal: Increase 10 meter walk by 0.17 m/s      Therapeutic Exercise Summary: HEP: LTR, side lying leg circles, kate pose w/ lateral stretch, standing alt hip abduction, seated SKOS stretch, modified posterior pelvic tilts -> 3 x 30 sec hold    Patient education: Discussed trial of short hike (20 min) w/ back brace and using seated SKOS stretch when tingling increases          Therapeutic Treatments and Modalities:     1. Therapeutic Activities (CPT 30720), see below    Therapeutic Treatment and Modalities Summary: Worked on using sleeping positions: ,  - supine with roll under knees //dec foot tingling;   - R side sleeping w/ pillow between iliac crest and ribs // inc foot tingling  - R side sleeping w/ pillow between knees // inc foot tingling  ** Encouraged her to increase support behind knees with lying supine    Worked on reaching/ bending  - lunge to ground w/ L LE fwd = 2 x 5 reps w/ 1# weight // no inc in foot tingling  - RDL on L LE w/ reaching to ground = 5x 1#, 5x 0# (L UE on mat table for stability)  // inc foot tingling  - squat to ground 5x     Time-based treatments/modalities:    Physical Therapy Timed Treatment Charges  Therapeutic activity minutes (CPT 67480): 20 minutes  Therapeutic exercise minutes (CPT 05003): 25 minutes      ASSESSMENT:   Response to treatment: Increased tingling with lying on R side and using SL RDL for reaching to ground. Symptoms decrease with supine DKTC stretch.     PLAN/RECOMMENDATIONS:   Plan for treatment: therapy treatment to continue next visit.  Planned interventions for next visit: continue with current treatment.

## 2022-05-04 ENCOUNTER — PHYSICAL THERAPY (OUTPATIENT)
Dept: PHYSICAL THERAPY | Facility: MEDICAL CENTER | Age: 82
End: 2022-05-04
Attending: SPECIALIST
Payer: COMMERCIAL

## 2022-05-04 DIAGNOSIS — M54.50 LOW BACK PAIN, UNSPECIFIED BACK PAIN LATERALITY, UNSPECIFIED CHRONICITY, UNSPECIFIED WHETHER SCIATICA PRESENT: ICD-10-CM

## 2022-05-04 PROCEDURE — 97110 THERAPEUTIC EXERCISES: CPT

## 2022-05-04 PROCEDURE — 97140 MANUAL THERAPY 1/> REGIONS: CPT

## 2022-05-04 NOTE — OP THERAPY DAILY TREATMENT
Outpatient Physical Therapy  DAILY TREATMENT     [facility] Outpatient Physical Therapy  [address]  [place] [address]  Phone:  [phone]  Fax:  [phone]    Date: 05/04/2022    Patient: Antonio Treviño  YOB: 1940  MRN: [id number]     Time Calculation    Start time: 1050  Stop time: 1135 Time Calculation (min): 45 minutes         Chief Complaint: Back Problem    Visit #: 9    SUBJECTIVE:  Foot tingling about the same. Her R low back was sore yesterday but better this morning     OBJECTIVE:  Current objective measures: na          Therapeutic Exercises (CPT 41751):     1. NuSTep S7, A9, 6 min L3 (SPM )     2. Shuttle DL press w/ foam, 20x 4 cords    3. Shuttle SL press w/ foam, 15x ea 3 cords    4. Shuttle LPD (feet resting on foot plate), 2 x 10 1 cord    5. Shuttle rows, next visit    7. BOSU step up (flat side up), 15x ea     8. Balance board + foam balance (fwd/post), 2 x 15 sec, dec tingling post manual     19. New Goal: Increase 10 meter walk by 0.17 m/s      Therapeutic Exercise Summary: HEP: LTR, side lying leg circles, kate pose w/ lateral stretch, standing alt hip abduction, seated SKOS stretch, modified posterior pelvic tilts -> 3 x 30 sec hold    Patient education: Discussed trial of short hike (20 min) w/ back brace and using seated SKOS stretch when tingling increases          Therapeutic Treatments and Modalities:     1. Manual Therapy (CPT 19927), see below    Therapeutic Treatment and Modalities Summary: STW to plantar surface of both feet   PROM ankle dorsiflexion/ plantarflexion 10x bilat     Time-based treatments/modalities:    Physical Therapy Timed Treatment Charges  Manual therapy minutes (CPT 96338): 15 minutes  Therapeutic exercise minutes (CPT 10814): 30 minutes          ASSESSMENT:   Response to treatment: Decreased foot tingling (2-3/10) post manual. It quickly increased to 4-5/10 within 5 minutes of weight bearing.    Provided her with link to buy tool to self mobilize bottom of feet (foot log).   PLAN/RECOMMENDATIONS:   Plan for treatment: therapy treatment to continue next visit.  Planned interventions for next visit: continue with current treatment.

## 2022-05-11 ENCOUNTER — PHYSICAL THERAPY (OUTPATIENT)
Dept: PHYSICAL THERAPY | Facility: MEDICAL CENTER | Age: 82
End: 2022-05-11
Attending: SPECIALIST
Payer: COMMERCIAL

## 2022-05-11 DIAGNOSIS — M54.50 LOW BACK PAIN, UNSPECIFIED BACK PAIN LATERALITY, UNSPECIFIED CHRONICITY, UNSPECIFIED WHETHER SCIATICA PRESENT: ICD-10-CM

## 2022-05-11 PROCEDURE — 97110 THERAPEUTIC EXERCISES: CPT

## 2022-05-11 PROCEDURE — 97140 MANUAL THERAPY 1/> REGIONS: CPT

## 2022-05-11 NOTE — OP THERAPY DAILY TREATMENT
Outpatient Physical Therapy  DAILY TREATMENT     [facility] Outpatient Physical Therapy  [address]  [place] [address]  Phone:  [phone]  Fax:  [phone]    Date: 05/11/2022    Patient: Antonio Treviño  YOB: 1940  MRN: [id number]     Time Calculation    Start time: 1055  Stop time: 1150 Time Calculation (min): 55 minutes         Chief Complaint: Back Problem    Visit #: 10    SUBJECTIVE: She got a foot roller and is using it once a day. She reports relief of foot tingling that lasts ~ one hour.     OBJECTIVE:  Current objective measures: na          Therapeutic Exercises (CPT 72484):     1. NuSTep S7, A9, 6 min L3 (SPM )     2. Shuttle DL press w/ foam, 20x 4 cords-NT    3. Shuttle SL press w/ foam, 15x ea 3 cords-NT    4. Shuttle LPD (feet resting on foot plate), 2 x 10 1c    5. Shuttle rows, 2 x 10 1c, next visit    7. BOSU step up , 10x ea    8. Balance board  (fwd/post), 2 x 15 sec, circular    9. Step up on circular balance board, 10x ea     11. Self mobilization w/ foot log, 5 min, bilat feet, seated , verbal cues to increase pressure     19. New Goal: Increase 10 meter walk by 0.17 m/s      Therapeutic Exercise Summary: HEP: LTR, side lying leg circles, kate pose w/ lateral stretch, standing alt hip abduction, seated SKOS stretch, modified posterior pelvic tilts -> 3 x 30 sec hold    Patient education: Taught patient appropriate activation of core stabilizers when walking to enhance stability of spine.        Therapeutic Treatments and Modalities:     1. Manual Therapy (CPT 24914), see below    Therapeutic Treatment and Modalities Summary: STW to plantar surface of both feet   PROM ankle dorsiflexion/ plantarflexion 10x bilat     Time-based treatments/modalities:    Physical Therapy Timed Treatment Charges  Manual therapy minutes (CPT 37451): 30 minutes  Therapeutic exercise minutes (CPT 91791): 25 minutes      ASSESSMENT:   Response to  treatment: TTP and increased tingling when palpating bilat calcaneal tuberosities. She does have adhesions here in both feet. Reviewed using foot self massager at home with appropriate pressure to reach deep tissue to decrease this.      PLAN/RECOMMENDATIONS:   Plan for treatment: therapy treatment to continue next visit.  Planned interventions for next visit: continue with current treatment. Resume shuttle w/ foam and address posture with walking to decrease tingling.

## 2022-07-27 ENCOUNTER — HOSPITAL ENCOUNTER (OUTPATIENT)
Dept: LAB | Facility: MEDICAL CENTER | Age: 82
End: 2022-07-27
Attending: INTERNAL MEDICINE
Payer: COMMERCIAL

## 2022-07-27 LAB
25(OH)D3 SERPL-MCNC: 58 NG/ML (ref 30–100)
CA-I SERPL-SCNC: 1.24 MMOL/L (ref 1.1–1.3)
CALCIUM SERPL-MCNC: 10.1 MG/DL (ref 8.4–10.2)
PTH-INTACT SERPL-MCNC: 9 PG/ML (ref 14–72)

## 2022-07-27 PROCEDURE — 82310 ASSAY OF CALCIUM: CPT

## 2022-07-27 PROCEDURE — 82330 ASSAY OF CALCIUM: CPT

## 2022-07-27 PROCEDURE — 36415 COLL VENOUS BLD VENIPUNCTURE: CPT

## 2022-07-27 PROCEDURE — 83970 ASSAY OF PARATHORMONE: CPT

## 2022-07-27 PROCEDURE — 82306 VITAMIN D 25 HYDROXY: CPT

## 2022-08-19 ENCOUNTER — OFFICE VISIT (OUTPATIENT)
Dept: INTERNAL MEDICINE | Facility: OTHER | Age: 82
End: 2022-08-19
Payer: COMMERCIAL

## 2022-08-19 VITALS
SYSTOLIC BLOOD PRESSURE: 130 MMHG | TEMPERATURE: 98.2 F | HEIGHT: 61 IN | HEART RATE: 77 BPM | WEIGHT: 109 LBS | DIASTOLIC BLOOD PRESSURE: 59 MMHG | OXYGEN SATURATION: 95 % | BODY MASS INDEX: 20.58 KG/M2

## 2022-08-19 DIAGNOSIS — R25.2 CRAMPS OF LOWER EXTREMITY: ICD-10-CM

## 2022-08-19 DIAGNOSIS — E78.5 DYSLIPIDEMIA: ICD-10-CM

## 2022-08-19 DIAGNOSIS — R01.1 MURMUR: ICD-10-CM

## 2022-08-19 DIAGNOSIS — M81.0 AGE-RELATED OSTEOPOROSIS WITHOUT CURRENT PATHOLOGICAL FRACTURE: ICD-10-CM

## 2022-08-19 DIAGNOSIS — Z76.89 ENCOUNTER TO ESTABLISH CARE: ICD-10-CM

## 2022-08-19 DIAGNOSIS — R73.03 PREDIABETES: ICD-10-CM

## 2022-08-19 DIAGNOSIS — D72.829 LEUKOCYTOSIS, UNSPECIFIED TYPE: ICD-10-CM

## 2022-08-19 PROBLEM — R93.89 THICKENED ENDOMETRIUM: Status: RESOLVED | Noted: 2020-11-11 | Resolved: 2022-08-19

## 2022-08-19 PROBLEM — M25.579 ANKLE PAIN: Status: RESOLVED | Noted: 2019-09-05 | Resolved: 2022-08-19

## 2022-08-19 PROBLEM — N83.202 LEFT OVARIAN CYST: Status: RESOLVED | Noted: 2020-11-11 | Resolved: 2022-08-19

## 2022-08-19 PROCEDURE — 99204 OFFICE O/P NEW MOD 45 MIN: CPT | Performed by: INTERNAL MEDICINE

## 2022-08-19 ASSESSMENT — FIBROSIS 4 INDEX: FIB4 SCORE: 1.56

## 2022-08-19 ASSESSMENT — PATIENT HEALTH QUESTIONNAIRE - PHQ9: CLINICAL INTERPRETATION OF PHQ2 SCORE: 0

## 2022-08-19 NOTE — PATIENT INSTRUCTIONS
Labs today  Will call you for echo  Try 2 min calf and balls of feet stretches  Nice to meet you!

## 2022-08-19 NOTE — PROGRESS NOTES
Subjective     Antonio Treviño is a 81 y.o. female who presents with Follow-Up, Muscle Pain (Muscle cramps ), Referral Needed (Endocrinology at [facility] ), and Osteoporosis            HPI   81 year old woman with spinal stenosis s/p laminectomy complicated by periperal neuropathy, also with compression fracture - followed by neuro, osteoporosis on forteo followed by endo, prediabetes, vitelligo, hx bppv s/p Epley with resolution, improved dry eye in setting of prior eye surgery, L wrist sprain s/p surgery and subsequent fracture, functional murmur (prev evaluted by cardio and echo per report) here to establish care.      PSH: TAHBSO (due to cyst), laminectomy, cataract surgery b/l  Fam: no pertient M/F hx per patient  Social: no smoking, no alcohol, no drugs, retired nurse  Allegies: vomiting with opiates  Meds: Reviewed per EMR    The patient wished to speak about 4 different topics: Calf cramps most nights, left hand finger cramps mostly at night, she wished to talk about her next due DEXA screening/osteoporosis follow-up/PTH labs, and lipids.    Patient reports bilateral calf cramps that have been going on for a significant period of time, reports that they occur at night most nights and resolve spontaneously on their own after several seconds but are extremely painful.  She also reports a similar type of cramp that occurs also at night on her left fingers, seems to be predominantly the ring finger, and occasionally while she is driving on the balls of her L foot, but no cramps anywhere else in her body.  She reports drinking at least 9 cups of water daily, she reports significant stretching of the calves daily-specifically states that she stretches for 30-minute intervals of 2 different types of calf stretches multiple times a day.  Denies any other significant changes.  Denies palpitations or diffuse cramping.      ROS           Objective     /59 (BP Location: Left arm, Patient Position: Sitting, BP Cuff  "Size: Adult)   Pulse 77   Temp 36.8 °C (98.2 °F) (Temporal)   Ht 1.56 m (5' 1.42\")   Wt 49.4 kg (109 lb)   SpO2 95%   BMI 20.32 kg/m²      Physical Exam  General: Pleasant woman in no apparent distress, alert, responding appropriately  HEENT: No cervical or clavicular lymphadenopathy, no thyromegaly  Cardiovascular: Regular rate and rhythm, she has a 3 out of 6 early peaking systolic ejection murmur loudest at the second intercostal space right sternal border, no thrill present.  Pulmonary: Overall clear to auscultation bilaterally  Abdomen: Soft, nontender  Extremities: No lower extremity edema bilaterally.  Hands appear normal, gross  strength was 5/5 on L hand.            Assessment & Plan     Establishment of Care  -history comprehensively reviewed    Muscle cramps, etiology unclear  -No clear inciting event, patient appears to be well-hydrated and also is stretching regularly.  Did demonstrate slightly different calf exercises to try and recommended trialing holding for 2 minutes at a time as opposed to 30 seconds at a time for maximal efficacy, however, given she is already stretching this may not make a significant benefit.  Has occasional L foot cramps while driving also, she has a foot roller, encouraged use along with lacrosse ball foot stretches/mobilization.  Hand cramps could also benefit from stretching potentially, but not fully discussed today as focus was on calf/foot cramps.  We will check BMP and magnesium to ensure no electrolyte related etiology.  If negative could consider quinine/ginger ale at future visit via shared decision making.    Prediabtes  -We will check A1c for monitoring, patient was barely prediabetic reports eating a diet rich in fruits and vegetables, we will follow-up at next visit    Likely flow murmur  -Patient is asymptomatic from a cardiovascular standpoint, she reports 2 prior evaluations for this murmur that has been known since she was a young adult, 1 by " cardiologist with a subsequent repeat evaluation by echocardiography, both of which were unrevealing/she was told that this was a flow murmur.  However, on my exam today the murmur is a 3 out of 6 systolic ejection murmur.  Strictly speaking, per ACC criteria this intensity of murmur should be evaluated as being pathologic.  Since it has been many years since the patient has been evaluated, but she also is completely asymptomatic, we reviewed possible options: Continue to monitor, or repeat echocardiogram to ensure has not progressed/evolved into a different murmur.  Patient elected to obtain an echocardiogram, if this is negative no further work-up is needed.    Elevated ldl  -Patient had a LDL of 1 year ago of 181, previously it ranged from 88-1 63.  Overall patient is healthy, we agreed to repeat the LDL and discuss further at future visit, if below 190 would likely not intervene in any way, but again we will revisit at future visit.    Prior leukocytosis  -Patient has a most recent CBC 2 years ago with a leukocytosis, she is asymptomatic/afebrile, will repeat CBC to ensure resolution.       Spinal stenosis - appreciate neuro support, encourage patient to follow-up with them regarding any future management of her gabapentin or symptoms.    Endocrine referral placed per patient request for injectible osteoporosis therapy to re-establish, apprecirae support.  Counseled that need for repeating DEXA scans on medical therapy for osteoporosis is not clear, probably would not benefit with repeat before 5 years, but will defer to endocrine given they are the primary team managing her Forteo injections.  Also noted that pth ordered by her previous endocrinologist was low, but ca and vit d nl.  Defer to endocrine for further mgmt.    Counseled patient that if she is eating a well totally well-balanced/healthy diet that she does not need to supplement with vitamins, and that they are not FDA approved so could contain  contaminants.    Return to clinic in 1 month to follow-up on muscle cramps, echo, and prediabetes/LDL/WBC labs. then Inqauire about possible constipation. Future visit healthcare maintenance/goals of care annual exam.

## 2022-08-25 ENCOUNTER — HOSPITAL ENCOUNTER (OUTPATIENT)
Dept: LAB | Facility: MEDICAL CENTER | Age: 82
End: 2022-08-25
Attending: INTERNAL MEDICINE
Payer: COMMERCIAL

## 2022-08-25 DIAGNOSIS — R25.2 CRAMPS OF LOWER EXTREMITY: ICD-10-CM

## 2022-08-25 DIAGNOSIS — R73.03 PREDIABETES: ICD-10-CM

## 2022-08-25 DIAGNOSIS — E78.5 DYSLIPIDEMIA: ICD-10-CM

## 2022-08-25 DIAGNOSIS — D72.829 LEUKOCYTOSIS, UNSPECIFIED TYPE: ICD-10-CM

## 2022-08-25 LAB
ANION GAP SERPL CALC-SCNC: 8 MMOL/L (ref 7–16)
BUN SERPL-MCNC: 14 MG/DL (ref 8–22)
CALCIUM SERPL-MCNC: 9.5 MG/DL (ref 8.4–10.2)
CHLORIDE SERPL-SCNC: 102 MMOL/L (ref 96–112)
CHOLEST SERPL-MCNC: 227 MG/DL (ref 100–199)
CO2 SERPL-SCNC: 27 MMOL/L (ref 20–33)
CREAT SERPL-MCNC: 0.65 MG/DL (ref 0.5–1.4)
ERYTHROCYTE [DISTWIDTH] IN BLOOD BY AUTOMATED COUNT: 42.1 FL (ref 35.9–50)
EST. AVERAGE GLUCOSE BLD GHB EST-MCNC: 114 MG/DL
FASTING STATUS PATIENT QL REPORTED: NORMAL
GFR SERPLBLD CREATININE-BSD FMLA CKD-EPI: 87 ML/MIN/1.73 M 2
GLUCOSE SERPL-MCNC: 100 MG/DL (ref 65–99)
HBA1C MFR BLD: 5.6 % (ref 4–5.6)
HCT VFR BLD AUTO: 41.2 % (ref 37–47)
HDLC SERPL-MCNC: 58 MG/DL
HGB BLD-MCNC: 14 G/DL (ref 12–16)
LDLC SERPL CALC-MCNC: 151 MG/DL
MAGNESIUM SERPL-MCNC: 1.7 MG/DL (ref 1.5–2.5)
MCH RBC QN AUTO: 32.5 PG (ref 27–33)
MCHC RBC AUTO-ENTMCNC: 34 G/DL (ref 33.6–35)
MCV RBC AUTO: 95.6 FL (ref 81.4–97.8)
PLATELET # BLD AUTO: 299 K/UL (ref 164–446)
PMV BLD AUTO: 9.6 FL (ref 9–12.9)
POTASSIUM SERPL-SCNC: 4.2 MMOL/L (ref 3.6–5.5)
RBC # BLD AUTO: 4.31 M/UL (ref 4.2–5.4)
SODIUM SERPL-SCNC: 137 MMOL/L (ref 135–145)
TRIGL SERPL-MCNC: 88 MG/DL (ref 0–149)
WBC # BLD AUTO: 4.3 K/UL (ref 4.8–10.8)

## 2022-08-25 PROCEDURE — 83735 ASSAY OF MAGNESIUM: CPT

## 2022-08-25 PROCEDURE — 80048 BASIC METABOLIC PNL TOTAL CA: CPT

## 2022-08-25 PROCEDURE — 36415 COLL VENOUS BLD VENIPUNCTURE: CPT

## 2022-08-25 PROCEDURE — 80061 LIPID PANEL: CPT

## 2022-08-25 PROCEDURE — 85027 COMPLETE CBC AUTOMATED: CPT

## 2022-08-25 PROCEDURE — 83036 HEMOGLOBIN GLYCOSYLATED A1C: CPT

## 2022-08-26 ENCOUNTER — TELEPHONE (OUTPATIENT)
Dept: INTERNAL MEDICINE | Facility: OTHER | Age: 82
End: 2022-08-26
Payer: COMMERCIAL

## 2022-08-26 NOTE — OR NURSING
1041- Pt to PACU from OR. Report from anesthesia and OR RN. 10L O2 via mask. No airway. Respirations even and unlabored. VSS.      1126- pt medicated with ibuprofen, declines IV pain medication. Pt drinking sips of water.    1205- pt spouse in tucker, updated with timeline    1243- lab at bedside for CBC    1252- pt meets criteria for phase 2, report to Madeline DAHL RN. Pt moved to PACU2                             [employer] Counseling:  I discussed with the patient the risks of mycophenolate mofetil including but not limited to infection/immunosuppression, GI upset, hypokalemia, hypercholesterolemia, bone marrow suppression, lymphoproliferative disorders, malignancy, GI ulceration/bleed/perforation, colitis, interstitial lung disease, kidney failure, progressive multifocal leukoencephalopathy, and birth defects.  The patient understands that monitoring is required including a baseline creatinine and regular CBC testing. In addition, patient must alert us immediately if symptoms of infection or other concerning signs are noted.

## 2022-08-27 NOTE — TELEPHONE ENCOUNTER
Spoke to patient regarding her lab, she is mildly leukopenic at 4.3, looking back over the last 3 years she has actually had 3 readings that were mildly leukopenic between 4.1 and 4.7, she has no fevers, chills, night sweats, feels well.  She is also had an elevated reading a year ago and a few normal readings.  This very well may be of no clinical significance, we agreed to simply monitor and discuss at our follow-up in a few weeks.  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

## 2022-09-14 PROBLEM — M25.832 ULNAR IMPACTION SYNDROME, LEFT: Status: ACTIVE | Noted: 2022-09-14

## 2022-09-14 PROBLEM — G56.02 CARPAL TUNNEL SYNDROME OF LEFT WRIST: Status: ACTIVE | Noted: 2022-09-14

## 2022-09-14 PROBLEM — M18.12 PRIMARY OSTEOARTHRITIS OF FIRST CARPOMETACARPAL JOINT OF LEFT HAND: Status: ACTIVE | Noted: 2022-09-14

## 2022-09-16 ENCOUNTER — OFFICE VISIT (OUTPATIENT)
Dept: INTERNAL MEDICINE | Facility: OTHER | Age: 82
End: 2022-09-16
Payer: COMMERCIAL

## 2022-09-16 VITALS
TEMPERATURE: 97.8 F | HEART RATE: 71 BPM | OXYGEN SATURATION: 94 % | WEIGHT: 108.4 LBS | SYSTOLIC BLOOD PRESSURE: 117 MMHG | DIASTOLIC BLOOD PRESSURE: 71 MMHG | HEIGHT: 61 IN | BODY MASS INDEX: 20.47 KG/M2

## 2022-09-16 DIAGNOSIS — K59.00 CONSTIPATION, UNSPECIFIED CONSTIPATION TYPE: ICD-10-CM

## 2022-09-16 DIAGNOSIS — R25.2 MUSCLE CRAMPS: ICD-10-CM

## 2022-09-16 DIAGNOSIS — D72.819 LEUKOPENIA, UNSPECIFIED TYPE: ICD-10-CM

## 2022-09-16 DIAGNOSIS — Z71.9 COUNSELING, UNSPECIFIED: ICD-10-CM

## 2022-09-16 PROCEDURE — 99214 OFFICE O/P EST MOD 30 MIN: CPT | Performed by: INTERNAL MEDICINE

## 2022-09-16 ASSESSMENT — FIBROSIS 4 INDEX: FIB4 SCORE: 1.22

## 2022-09-16 NOTE — PROGRESS NOTES
"Subjective     Antonio Treviño is a 81 y.o. female who presents with Hyperlipidemia (Follow up)            HPI   81 year old woman with spinal stenosis s/p laminectomy complicated by periperal neuropathy, also with compression fracture - followed by neuro, osteoporosis on forteo followed by ramon oliver, hx bppv s/p Epley with resolution, improved dry eye in setting of prior eye surgery, L wrist sprain s/p surgery and subsequent fracture, functional murmur (prev evaluted by cardio and echo per report) here to follow up on muscle cramps and labs for chronic conditions.     States perhaps slightly improved leg cramps with extensive stretching but still present.  Describes them as occurring only when her legs were in a bent position laying in bed or sometimes when she is driving the car, otherwise no cramps sensation.  They are not electric shocks or tingling, she has separate leg pain sensations that are not linked to this potentially related to her neuropathy.    Lab work reviewed, BMP and others normal, see below    ROS           Objective     /71 (BP Location: Left arm, Patient Position: Sitting, BP Cuff Size: Small adult)   Pulse 71   Temp 36.6 °C (97.8 °F) (Temporal)   Ht 1.549 m (5' 1\")   Wt 49.2 kg (108 lb 6.4 oz)   SpO2 94%   BMI 20.48 kg/m²      Physical Exam  General: Pleasant woman in no apparent distress, alert, responding appropriately              Assessment & Plan       Calf muscle cramps, etiology unclear  -Continue stretching and hydration  -Potassium magnesium and calcium all normal, discussed with patient  -Patient has tried taking a multivitamin with B vitamins, and in fact her B6 vitamin level was previously supratherapeutic, she reports no change in Symptomatology with this, as such likely no utility in trialing B vitamin supplementation.  Discussed possibility of using diltiazem, patient politely declines at present, stating that the cramps are only lasting a few minutes when they " occur and that overall they are tolerable.  Discussed that we could revisit this if worsens, and that quinidine is another option although has a very significant side effect profile and would not recommend unless cramps are debilitating.    Prediabtes, resolved  -Resolved on repeat A1c, praised patient, continue diet and exercise.    Mild leukopenia, etiology unclear  -Patient denies fevers, chills, night sweats, well-appearing, weight is stable in EMR.  Was previously slightly elevated at 13.2, now 4.3.  Discussed I would recommend repeat with a manual differential now, patient prefers to wait until subsequent blood draw, this is not unreasonable given that she is otherwise asymptomatic and is only a mild finding.    ASCVD discussion, began and partially discussed.  -LDL is 151, given that the patient is not diabetic, has no history of cardiovascular disease, is a non-smoker, and is not hypertensive, her elevated ASCVD would be mostly age-related at this point.  Discussed with patient that overall healthy lifestyle interventions would be the most effective and appropriate thing to reduce her risk of cardiovascular disease, patient in agreement with this plan.  At follow-up can complete this discussion but discussing the age component of the ASCVD elevation and her preference to treat this versus not with statin therapy.    Constipation, improved  -Not fully addressed today.  Patient reports daily bowel movements although they are somewhat hard, states that she is eating increased fiber and using MiraLAX as needed.  Offered to discuss further at future visit, patient states she is fine with monitoring for now.    Chronic issues from prior visit, not discussed this visit:    Likely flow murmur  -Patient is asymptomatic from a cardiovascular standpoint, she reports 2 prior evaluations for this murmur that has been known since she was a young adult, 1 by cardiologist with a subsequent repeat evaluation by  echocardiography, both of which were unrevealing/she was told that this was a flow murmur.  However, on my exam today the murmur is a 3 out of 6 systolic ejection murmur.  Strictly speaking, per ACC criteria this intensity of murmur should be evaluated as being pathologic.  Since it has been many years since the patient has been evaluated, but she also is completely asymptomatic, we reviewed possible options: Continue to monitor, or repeat echocardiogram to ensure has not progressed/evolved into a different murmur.  Patient elected to obtain an echocardiogram, if this is negative no further work-up is needed.    Prior leukocytosis  -Resolved, now mildly leukopenic, discussed elsewhere       Spinal stenosis - appreciate neuro support, encourage patient to follow-up with them regarding any future management of her gabapentin or symptoms.    Endocrine referral placed per patient request for injectible osteoporosis therapy to re-establish, apprecirae support.  Counseled that need for repeating DEXA scans on medical therapy for osteoporosis is not clear, probably would not benefit with repeat before 5 years, but will defer to endocrine given they are the primary team managing her Forteo injections.  Also noted that pth ordered by her previous endocrinologist was low, but ca and vit d nl.  Defer to endocrine for further mgmt.    Counseled patient that if she is eating a well totally well-balanced/healthy diet that she does not need to supplement with vitamins, and that they are not FDA approved so could contain contaminants.    Return to clinic in 2 months to follow-up on echo, review that patient has mild gfr reduction (ckd g2) and annual exam/goals of care.  As part of this discussion would discuss the age component of the ASCVD, although patient per previous discussions will very likely not be in favor of starting a statin specifically to treat age-related ASCVD given low other all overall risk factors..  Then return  to clinic in 6 months / 1 year.

## 2022-10-18 ENCOUNTER — HOSPITAL ENCOUNTER (OUTPATIENT)
Dept: CARDIOLOGY | Facility: MEDICAL CENTER | Age: 82
End: 2022-10-18
Attending: INTERNAL MEDICINE
Payer: COMMERCIAL

## 2022-10-18 DIAGNOSIS — R01.1 MURMUR: ICD-10-CM

## 2022-10-18 LAB — LV EJECT FRACT  99904: 55

## 2022-10-18 PROCEDURE — 93306 TTE W/DOPPLER COMPLETE: CPT | Mod: 26 | Performed by: INTERNAL MEDICINE

## 2022-10-18 PROCEDURE — 93306 TTE W/DOPPLER COMPLETE: CPT

## 2022-11-17 NOTE — PROGRESS NOTES
Subjective     Antonio Treviño is a 81 y.o. female who presents with No chief complaint on file.            HPI   81 year old woman with spinal stenosis s/p laminectomy complicated by periperal neuropathy, also with compression fracture - followed by neuro, osteoporosis on forteo followed by endo, ramon, hx bppv s/p Epley with resolution, improved dry eye in setting of prior eye surgery, L wrist sprain s/p surgery and subsequent fracture, functional murmur (prev evaluted by cardio and echo per report) here to for annual exam and to follow up on echo results.  Offers no issues to discuss.  Reports calf cramping unchanged but manageable.    ROS           Objective     VSS    Physical Exam  General: Pleasant woman in no apparent distress, alert, responding appropriately              Assessment & Plan     Healthcare Maintenance    Colonoscopy: 2017, GI following and she diann lschedule w/I next year, denies stool changes  HIV: offered, check today  Syphilis: offered, check today  Hep C: offered, check today  A1c: fbs 100, a1c 5.6, pt acctively working on diet/exercise  ASCVD: prev discussed, pt prefers diet/exercise focus which is reasonable as her elevated ascvd largely age related, also discus coronary artery scoring/ct, pt prefers to observe with diet/exercise at present.  Denies cp/sob with exertion.  Smoking: never  Alcohol Use: never  Recreational Drugs: never  Healthy Eating/Exercise: discussed veg inclined diet evidence based and exericse, patient is very physically active doing 1 hour walks, aquatherapy/exercise, home PT exercises, praised patients healthy efforts  Depression and Anxiety: neg phq2.  States some anxiety about age and futrure and what will happen to her  (who has dementia) if she was to pass away. States feeling ok overall though, declcines making visit to discuss further.  Offered to help support the patient at any time such as with coordinating support for care for her  .  Immunizations: utd covid, pna, tdap, shingrix, flu shot    Mammogram: neg 2018, will repeat today  PAP: s/p no non-cancer, prior pap nl, no further monitoring needed  DEXA: osteoporosis on tx per endo, apprecite support  Goals of Care: has advanced directive    History of functional murmur  -Echo had only mild valvular abnormalities and aortic sclerosis, otherwise normal.  Discussed with patient, continue diet and exercise, no further intervention needed.  Likely flow murmur    Mild leukopenia, etiology unclear  -Patient previously denied fevers, chills, night sweats, well-appearing, weight is stable in EMR.  Was previously slightly elevated at 13.2, now 4.3.  since drawing labs as above, will repeat cbc with manual diff to trend wbc.      From Prior Visit, not addressed:        Calf muscle cramps, etiology unclear  -Continue stretching and hydration  -Potassium magnesium and calcium all normal, discussed with patient  -Patient has tried taking a multivitamin with B vitamins, and in fact her B6 vitamin level was previously supratherapeutic, she reports no change in Symptomatology with this, as such likely no utility in trialing B vitamin supplementation.  Discussed possibility of using diltiazem, patient politely declines at present, stating that the cramps are only lasting a few minutes when they occur and that overall they are tolerable.  Discussed that we could revisit this if worsens, and that quinidine is another option although has a very significant side effect profile and would not recommend unless cramps are debilitating.    Prediabtes, resolved  -Resolved on repeat A1c, praised patient, continue diet and exercise.        ASCVD discussion, began and partially discussed.  -LDL is 151, given that the patient is not diabetic, has no history of cardiovascular disease, is a non-smoker, and is not hypertensive, her elevated ASCVD would be mostly age-related at this point.  Discussed with patient that  overall healthy lifestyle interventions would be the most effective and appropriate thing to reduce her risk of cardiovascular disease, patient in agreement with this plan.  At follow-up can complete this discussion but discussing the age component of the ASCVD elevation and her preference to treat this versus not with statin therapy.    Constipation, improved  -Not fully addressed today.  Patient reports daily bowel movements although they are somewhat hard, states that she is eating increased fiber and using MiraLAX as needed.  Offered to discuss further at future visit, patient states she is fine with monitoring for now.     Spinal stenosis - appreciate neuro support, encourage patient to follow-up with them regarding any future management of her gabapentin or symptoms.    Endocrine referral placed per patient request for injectible osteoporosis therapy to re-establish, apprecirae support.  Counseled that need for repeating DEXA scans on medical therapy for osteoporosis is not clear, probably would not benefit with repeat before 5 years, but will defer to endocrine given they are the primary team managing her Forteo injections.  Also noted that pth ordered by her previous endocrinologist was low, but ca and vit d nl.  Defer to endocrine for further mgmt.    Counseled patient that if she is eating a well totally well-balanced/healthy diet that she does not need to supplement with vitamins, and that they are not FDA approved so could contain contaminants.    Return to clinic in 6-12 mo for annual exam/fu

## 2022-11-18 ENCOUNTER — OFFICE VISIT (OUTPATIENT)
Dept: INTERNAL MEDICINE | Facility: OTHER | Age: 82
End: 2022-11-18
Payer: COMMERCIAL

## 2022-11-18 VITALS
DIASTOLIC BLOOD PRESSURE: 65 MMHG | SYSTOLIC BLOOD PRESSURE: 115 MMHG | WEIGHT: 110 LBS | HEIGHT: 61 IN | TEMPERATURE: 97.8 F | OXYGEN SATURATION: 94 % | HEART RATE: 69 BPM | BODY MASS INDEX: 20.77 KG/M2

## 2022-11-18 DIAGNOSIS — Z12.31 ENCOUNTER FOR SCREENING MAMMOGRAM FOR MALIGNANT NEOPLASM OF BREAST: ICD-10-CM

## 2022-11-18 DIAGNOSIS — Z11.59 ENCOUNTER FOR HEPATITIS C SCREENING TEST FOR LOW RISK PATIENT: ICD-10-CM

## 2022-11-18 DIAGNOSIS — Z11.4 ENCOUNTER FOR SCREENING FOR HIV: ICD-10-CM

## 2022-11-18 DIAGNOSIS — Z11.3 ENCOUNTER FOR SCREENING FOR BACTERIAL SEXUALLY TRANSMITTED DISEASE: ICD-10-CM

## 2022-11-18 DIAGNOSIS — D72.819 LEUKOPENIA, UNSPECIFIED TYPE: ICD-10-CM

## 2022-11-18 PROCEDURE — 99397 PER PM REEVAL EST PAT 65+ YR: CPT | Performed by: INTERNAL MEDICINE

## 2022-11-18 ASSESSMENT — FIBROSIS 4 INDEX: FIB4 SCORE: 1.22

## 2022-12-01 ENCOUNTER — HOSPITAL ENCOUNTER (OUTPATIENT)
Dept: LAB | Facility: MEDICAL CENTER | Age: 82
End: 2022-12-01
Attending: INTERNAL MEDICINE
Payer: COMMERCIAL

## 2022-12-01 DIAGNOSIS — D72.819 LEUKOPENIA, UNSPECIFIED TYPE: ICD-10-CM

## 2022-12-01 DIAGNOSIS — Z11.4 ENCOUNTER FOR SCREENING FOR HIV: ICD-10-CM

## 2022-12-01 DIAGNOSIS — Z11.3 ENCOUNTER FOR SCREENING FOR BACTERIAL SEXUALLY TRANSMITTED DISEASE: ICD-10-CM

## 2022-12-01 DIAGNOSIS — Z11.59 ENCOUNTER FOR HEPATITIS C SCREENING TEST FOR LOW RISK PATIENT: ICD-10-CM

## 2022-12-01 LAB
BASOPHILS # BLD AUTO: 0.6 % (ref 0–1.8)
BASOPHILS # BLD: 0.04 K/UL (ref 0–0.12)
EOSINOPHIL # BLD AUTO: 0.04 K/UL (ref 0–0.51)
EOSINOPHIL NFR BLD: 0.6 % (ref 0–6.9)
ERYTHROCYTE [DISTWIDTH] IN BLOOD BY AUTOMATED COUNT: 43.9 FL (ref 35.9–50)
HCT VFR BLD AUTO: 41.1 % (ref 37–47)
HCV AB SER QL: NORMAL
HGB BLD-MCNC: 14.1 G/DL (ref 12–16)
HIV 1+2 AB+HIV1 P24 AG SERPL QL IA: NORMAL
IMM GRANULOCYTES # BLD AUTO: 0.02 K/UL (ref 0–0.11)
IMM GRANULOCYTES NFR BLD AUTO: 0.3 % (ref 0–0.9)
LYMPHOCYTES # BLD AUTO: 1.12 K/UL (ref 1–4.8)
LYMPHOCYTES NFR BLD: 17.2 % (ref 22–41)
MCH RBC QN AUTO: 33.1 PG (ref 27–33)
MCHC RBC AUTO-ENTMCNC: 34.3 G/DL (ref 33.6–35)
MCV RBC AUTO: 96.5 FL (ref 81.4–97.8)
MONOCYTES # BLD AUTO: 0.56 K/UL (ref 0–0.85)
MONOCYTES NFR BLD AUTO: 8.6 % (ref 0–13.4)
NEUTROPHILS # BLD AUTO: 4.73 K/UL (ref 2–7.15)
NEUTROPHILS NFR BLD: 72.7 % (ref 44–72)
NRBC # BLD AUTO: 0 K/UL
NRBC BLD-RTO: 0 /100 WBC
PLATELET # BLD AUTO: 280 K/UL (ref 164–446)
PMV BLD AUTO: 9.2 FL (ref 9–12.9)
RBC # BLD AUTO: 4.26 M/UL (ref 4.2–5.4)
T PALLIDUM AB SER QL IA: NORMAL
WBC # BLD AUTO: 6.5 K/UL (ref 4.8–10.8)

## 2022-12-01 PROCEDURE — 87389 HIV-1 AG W/HIV-1&-2 AB AG IA: CPT

## 2022-12-01 PROCEDURE — 85025 COMPLETE CBC W/AUTO DIFF WBC: CPT

## 2022-12-01 PROCEDURE — 36415 COLL VENOUS BLD VENIPUNCTURE: CPT

## 2022-12-01 PROCEDURE — 86780 TREPONEMA PALLIDUM: CPT

## 2022-12-01 PROCEDURE — 86803 HEPATITIS C AB TEST: CPT

## 2022-12-05 ENCOUNTER — TELEPHONE (OUTPATIENT)
Dept: INTERNAL MEDICINE | Facility: OTHER | Age: 82
End: 2022-12-05
Payer: COMMERCIAL

## 2022-12-06 NOTE — TELEPHONE ENCOUNTER
Spoke to patient to follow-up on lab results.  Routine screening for hep C, HIV, syphilis all negative.  Mild leukopenia has normalized on repeat CBC, also with normal hemoglobin and platelets.  We will see her at her annual follow exam exam next year or as needed.

## 2022-12-21 ENCOUNTER — APPOINTMENT (OUTPATIENT)
Dept: RADIOLOGY | Facility: MEDICAL CENTER | Age: 82
End: 2022-12-21
Attending: INTERNAL MEDICINE
Payer: COMMERCIAL

## 2022-12-21 DIAGNOSIS — Z12.31 ENCOUNTER FOR SCREENING MAMMOGRAM FOR MALIGNANT NEOPLASM OF BREAST: ICD-10-CM

## 2022-12-21 PROCEDURE — 77067 SCR MAMMO BI INCL CAD: CPT

## 2022-12-27 ENCOUNTER — TELEPHONE (OUTPATIENT)
Dept: INTERNAL MEDICINE | Facility: OTHER | Age: 82
End: 2022-12-27
Payer: COMMERCIAL

## 2022-12-27 NOTE — TELEPHONE ENCOUNTER
"Called to follow up on mammogram, unclear if prev discussed with patient.  Left vm on personal line \"imaging study normal, please call if further questions, if not will see you at your annual exam next year.\"  "

## 2023-01-16 ENCOUNTER — HOSPITAL ENCOUNTER (OUTPATIENT)
Dept: RADIOLOGY | Facility: MEDICAL CENTER | Age: 83
End: 2023-01-16
Attending: INTERNAL MEDICINE
Payer: COMMERCIAL

## 2023-01-16 DIAGNOSIS — M80.00XA OSTEOPOROSIS WITH PATHOLOGICAL FRACTURE, INITIAL ENCOUNTER: ICD-10-CM

## 2023-01-16 PROCEDURE — 77080 DXA BONE DENSITY AXIAL: CPT

## 2023-04-28 ENCOUNTER — APPOINTMENT (RX ONLY)
Dept: URBAN - METROPOLITAN AREA CLINIC 15 | Facility: CLINIC | Age: 83
Setting detail: DERMATOLOGY
End: 2023-04-28

## 2023-04-28 DIAGNOSIS — D22 MELANOCYTIC NEVI: ICD-10-CM

## 2023-04-28 DIAGNOSIS — L30.0 NUMMULAR DERMATITIS: ICD-10-CM

## 2023-04-28 DIAGNOSIS — L80 VITILIGO: ICD-10-CM | Status: STABLE

## 2023-04-28 DIAGNOSIS — L82.1 OTHER SEBORRHEIC KERATOSIS: ICD-10-CM

## 2023-04-28 DIAGNOSIS — L84 CORNS AND CALLOSITIES: ICD-10-CM

## 2023-04-28 DIAGNOSIS — L60.3 NAIL DYSTROPHY: ICD-10-CM

## 2023-04-28 DIAGNOSIS — Z71.89 OTHER SPECIFIED COUNSELING: ICD-10-CM

## 2023-04-28 DIAGNOSIS — L82.0 INFLAMED SEBORRHEIC KERATOSIS: ICD-10-CM

## 2023-04-28 PROBLEM — D22.39 MELANOCYTIC NEVI OF OTHER PARTS OF FACE: Status: ACTIVE | Noted: 2023-04-28

## 2023-04-28 PROCEDURE — ? PRESCRIPTION

## 2023-04-28 PROCEDURE — ? SUNSCREEN TREATMENT REGIMEN

## 2023-04-28 PROCEDURE — 99203 OFFICE O/P NEW LOW 30 MIN: CPT

## 2023-04-28 PROCEDURE — ? COUNSELING

## 2023-04-28 RX ORDER — TRIAMCINOLONE ACETONIDE 1 MG/G
CREAM TOPICAL BID
Qty: 30 | Refills: 1 | Status: ERX | COMMUNITY
Start: 2023-04-28

## 2023-04-28 RX ADMIN — TRIAMCINOLONE ACETONIDE: 1 CREAM TOPICAL at 00:00

## 2023-04-28 ASSESSMENT — LOCATION DETAILED DESCRIPTION DERM
LOCATION DETAILED: LOWER STERNUM
LOCATION DETAILED: RIGHT INFERIOR MEDIAL MIDBACK
LOCATION DETAILED: RIGHT VENTRAL PROXIMAL FOREARM
LOCATION DETAILED: RIGHT MEDIAL MALAR CHEEK
LOCATION DETAILED: RIGHT CENTRAL TEMPLE
LOCATION DETAILED: LEFT SUPERIOR LATERAL LOWER BACK
LOCATION DETAILED: TIP OF RIGHT 3RD TOE
LOCATION DETAILED: TIP OF RIGHT 4TH TOE
LOCATION DETAILED: LEFT SUPERIOR UPPER BACK
LOCATION DETAILED: LEFT VENTRAL PROXIMAL FOREARM
LOCATION DETAILED: INFERIOR THORACIC SPINE
LOCATION DETAILED: RIGHT PROXIMAL POSTERIOR UPPER ARM
LOCATION DETAILED: LEFT SUPERIOR MEDIAL UPPER BACK
LOCATION DETAILED: PERIUMBILICAL SKIN
LOCATION DETAILED: MID POSTERIOR NECK
LOCATION DETAILED: RIGHT NASAL SIDEWALL
LOCATION DETAILED: RIGHT GREAT TOENAIL
LOCATION DETAILED: LEFT ANTERIOR PROXIMAL THIGH
LOCATION DETAILED: LEFT ANTERIOR PROXIMAL UPPER ARM
LOCATION DETAILED: RIGHT ANTERIOR PROXIMAL THIGH
LOCATION DETAILED: RIGHT SUPERIOR LATERAL LOWER BACK
LOCATION DETAILED: TIP OF RIGHT 2ND TOE
LOCATION DETAILED: LEFT CENTRAL ZYGOMA
LOCATION DETAILED: RIGHT ANTERIOR PROXIMAL UPPER ARM
LOCATION DETAILED: LEFT PROXIMAL POSTERIOR UPPER ARM

## 2023-04-28 ASSESSMENT — LOCATION ZONE DERM
LOCATION ZONE: TRUNK
LOCATION ZONE: NOSE
LOCATION ZONE: TOE
LOCATION ZONE: TOENAIL
LOCATION ZONE: FACE
LOCATION ZONE: LEG
LOCATION ZONE: ARM
LOCATION ZONE: NECK

## 2023-04-28 ASSESSMENT — LOCATION SIMPLE DESCRIPTION DERM
LOCATION SIMPLE: ABDOMEN
LOCATION SIMPLE: PLANTAR SURFACE OF RIGHT 4TH TOE
LOCATION SIMPLE: RIGHT TEMPLE
LOCATION SIMPLE: LEFT UPPER ARM
LOCATION SIMPLE: RIGHT THIGH
LOCATION SIMPLE: LEFT LOWER BACK
LOCATION SIMPLE: PLANTAR SURFACE OF RIGHT 2ND TOE
LOCATION SIMPLE: LEFT FOREARM
LOCATION SIMPLE: POSTERIOR NECK
LOCATION SIMPLE: LEFT UPPER BACK
LOCATION SIMPLE: LEFT THIGH
LOCATION SIMPLE: RIGHT POSTERIOR UPPER ARM
LOCATION SIMPLE: RIGHT UPPER ARM
LOCATION SIMPLE: LEFT POSTERIOR UPPER ARM
LOCATION SIMPLE: LEFT ZYGOMA
LOCATION SIMPLE: PLANTAR SURFACE OF RIGHT 3RD TOE
LOCATION SIMPLE: RIGHT GREAT TOE
LOCATION SIMPLE: RIGHT NOSE
LOCATION SIMPLE: CHEST
LOCATION SIMPLE: RIGHT LOWER BACK
LOCATION SIMPLE: UPPER BACK
LOCATION SIMPLE: RIGHT FOREARM
LOCATION SIMPLE: RIGHT CHEEK

## 2023-04-28 ASSESSMENT — BSA VITILIGO: % BODY COVERED IN VITILIGO: 40

## 2023-06-10 DIAGNOSIS — M81.0 AGE RELATED OSTEOPOROSIS, UNSPECIFIED PATHOLOGICAL FRACTURE PRESENCE: ICD-10-CM

## 2023-07-05 ENCOUNTER — TELEPHONE (OUTPATIENT)
Dept: INTERNAL MEDICINE | Facility: OTHER | Age: 83
End: 2023-07-05
Payer: COMMERCIAL

## 2023-07-05 NOTE — TELEPHONE ENCOUNTER
Patient has an appt with Dr Stewart on 08/24 and she would like to have labs ordered before her appt so she can have them done by the time she comes in.

## 2023-08-31 ENCOUNTER — OFFICE VISIT (OUTPATIENT)
Dept: INTERNAL MEDICINE | Facility: OTHER | Age: 83
End: 2023-08-31
Payer: COMMERCIAL

## 2023-08-31 VITALS
OXYGEN SATURATION: 93 % | BODY MASS INDEX: 20.32 KG/M2 | TEMPERATURE: 97.4 F | HEIGHT: 61 IN | DIASTOLIC BLOOD PRESSURE: 74 MMHG | HEART RATE: 68 BPM | WEIGHT: 107.6 LBS | SYSTOLIC BLOOD PRESSURE: 126 MMHG

## 2023-08-31 DIAGNOSIS — I87.2 VENOUS INSUFFICIENCY: ICD-10-CM

## 2023-08-31 DIAGNOSIS — M62.838 NECK MUSCLE SPASM: ICD-10-CM

## 2023-08-31 PROCEDURE — 3074F SYST BP LT 130 MM HG: CPT | Performed by: INTERNAL MEDICINE

## 2023-08-31 PROCEDURE — 99214 OFFICE O/P EST MOD 30 MIN: CPT | Performed by: INTERNAL MEDICINE

## 2023-08-31 PROCEDURE — 3078F DIAST BP <80 MM HG: CPT | Performed by: INTERNAL MEDICINE

## 2023-08-31 ASSESSMENT — PATIENT HEALTH QUESTIONNAIRE - PHQ9: CLINICAL INTERPRETATION OF PHQ2 SCORE: 0

## 2023-08-31 NOTE — PROGRESS NOTES
"Subjective     Antonio Treviño is a 81 y.o. female who presents with Follow-Up (Patient having leg pain when standing or setting long time.) and Neck Pain (Patient having neck and head pressure)            HPI   81 year old woman with spinal stenosis s/p laminectomy complicated by periperal neuropathy, also with compression fracture - followed by neuro, osteoporosis on forteo followed by endo, vitelligo, hx bppv s/p Epley with resolution, improved dry eye in setting of prior eye surgery, L wrist sprain s/p surgery and subsequent fracture, functional murmur (prev evaluted by cardio and echo per report) here for b/l leg tightness and neck /back of head pressure..    Patient reports approximately 3 months ago she had spontaneous onset of neck pressure that radiates up into the base of her head that occurs when she stands up and walks, it is alleviated when she lies down to rest.  No trauma to the neck, no fevers, chills, drenching night sweats, weight is overall stable.  No saddle paresthesia, bowel or bladder issues, patient ambulated readily into the exam room.      Reports separately from the neck issues also approximately 2 to 3 months ago she experienced bilateral \"tightness\" on her lower legs below the knees, particularly in the calves, although this is distinct from the calf cramping she has reported previously.  It is worse when she is sitting with her feet on the floor or when standing, gets better with leg elevation.  She also notices a color change where her legs but not feet become redder, this color change resolves with leg elevation.    ROS           Objective     VSS    Physical Exam  General: Pleasant woman in no apparent distress, alert, responding appropriately  Neuro: Negative Lhermitte sign  Musculoskeletal: Patient has a mild forward curvature of the shoulders connecting to the base of the neck/lordosis.  She is able to stand and ambulate readily.  Strength is 5 out of 5 in gross , elbow " extension, also elbow flexion although patient would not hold contraction as I do not think she understood the instructions clearly.  Negative Karly's bilaterally, sensation grossly intact to light touch on the dorsum of the hands bilaterally.  No overt midline tenderness of the neck, no significant scoliosis of the spine noted.  Patient has significant limitation of sidebending bilaterally in her neck, able to go perhaps only to 20 to 25 degrees.  Also has limitation in rotation left greater than right, getting to approximately 75 and 80 degrees respectively.  Relatively normal neck flexion and extension.  Very tight bilateral trapezius muscles on palpation.  Extremities: Visible varicosities bilaterally on the legs, although no pitting or nonpitting swelling.            Assessment & Plan     Neck pressure likely 2/2 paraspinal/trapezium muscle spasms and poor posture/biomechanics  -No alarm symptoms.  Significant neck limitation of motion in addition to tight trapezius musculature along with poor biomechanics while reading and baseline hunched posture are likely contributing to neck spasms which are for what ever reason exacerbated when she is standing and walking.    Plan  -Home physical therapy for neck mobilization, handout given, also demonstrated some exercises for patient and recommended massage, also yoga.  Offered formal PT, patient prefers to try home recommendations first.  -Counseled on ergonomics and proper posture  -Follow-up 2 months          Leg pressure likely 2/2 mild venous insufficeincy  -Varicosities on legs and symptoms are consistent with possible mild venous insufficiency.    Plan  -Discussed pathophysiology.  Recommended leg elevation when sitting as she is already doing.  Discussed possibility of wearing knee-high socks or formal fitting with compression stockings, patient reports attempting compression stockings but they were uncomfortable likely because they were improperly fitted.   As such, referral to lymphedema clinic 1 time for fitting for compression stockings, will try wearing them when ambulating only but not at night.  -Follow-up in 2 months    Patient reports calf cramping is only intermittently occurring, is doing the stretches daily and feels these are potentially helping.  Not otherwise addressed.    From Prior Visit, not addressed:    For next visit:    Healthcare Maintenance (from last visit, not yet covered)    Suggest fit testing if wishes to continue, depression/anxiety/caregiver/ a1c screening/constipation.    Colonoscopy: 2017, GI following and she diann lschedule w/I next year, denies stool changes  HIV: neg 2022  Syphilis: neg 2022  Hep C: neg 2022  A1c: fbs previously 100, a1c 5.6, pt acctively working on diet/exercise.  Check a1c for monioring  ASCVD: prev discussed, pt prefers diet/exercise focus which is reasonable as her elevated ascvd largely age related, also discus coronary artery scoring/ct, pt prefers to observe with diet/exercise at present.  Denies cp/sob with exertion.  Smoking: never  Alcohol Use: never  Recreational Drugs: never  Healthy Eating/Exercise: discussed veg inclined diet evidence based and exericse, patient is very physically active doing 1 hour walks, aquatherapy/exercise, home PT exercises, praised patients healthy efforts  Depression and Anxiety: neg phq2.  States some anxiety about age and futrure and what will happen to her  (who has dementia) if she was to pass away. States feeling ok overall though, declcines making visit to discuss further.  Offered to help support the patient at any time such as with coordinating support for care for her .  Immunizations: utd covid, pna, tdap, shingrix, flu shot    Mammogram: nl 2022, repeat 2024 if wishes.  PAP: s/p no non-cancer, prior pap nl, no further monitoring needed  DEXA: osteoporosis on tx per endo, apprecite support  Goals of Care: has advanced directive        History of functional  murmur  -Echo had only mild valvular abnormalities and aortic sclerosis, otherwise normal.  Discussed with patient, continue diet and exercise, no further intervention needed.  Likely flow murmur.    Mild leukopenia, etiology unclear  -Patient previously denied fevers, chills, night sweats, well-appearing, weight is stable in EMR.  Was previously slightly elevated at 13.2, now 4.3.  since drawing labs as above, will repeat cbc with manual diff to trend wbc.  -repeta cbc nl, follow clinically.              Calf muscle cramps, etiology unclear  -Continue stretching and hydration  -Potassium magnesium and calcium all normal, discussed with patient  -Patient has tried taking a multivitamin with B vitamins, and in fact her B6 vitamin level was previously supratherapeutic, she reports no change in Symptomatology with this, as such likely no utility in trialing B vitamin supplementation.  Discussed possibility of using diltiazem, patient politely declines at present, stating that the cramps are only lasting a few minutes when they occur and that overall they are tolerable.  Discussed that we could revisit this if worsens, and that quinidine is another option although has a very significant side effect profile and would not recommend unless cramps are debilitating.    Prediabtes, resolved  -Resolved on repeat A1c, praised patient, continue diet and exercise.        ASCVD discussion, began and partially discussed.  -LDL is 151, given that the patient is not diabetic, has no history of cardiovascular disease, is a non-smoker, and is not hypertensive, her elevated ASCVD would be mostly age-related at this point.  Discussed with patient that overall healthy lifestyle interventions would be the most effective and appropriate thing to reduce her risk of cardiovascular disease, patient in agreement with this plan.  At follow-up can complete this discussion but discussing the age component of the ASCVD elevation and her preference  to treat this versus not with statin therapy.    Constipation, improved  -Not fully addressed today.  Patient reports daily bowel movements although they are somewhat hard, states that she is eating increased fiber and using MiraLAX as needed.  Offered to discuss further at future visit, patient states she is fine with monitoring for now.     Spinal stenosis - appreciate neuro support, encourage patient to follow-up with them regarding any future management of her gabapentin or symptoms.    Endocrine referral placed per patient request for injectible osteoporosis therapy to re-establish, apprecirae support.  Counseled that need for repeating DEXA scans on medical therapy for osteoporosis is not clear, probably would not benefit with repeat before 5 years, but will defer to endocrine given they are the primary team managing her Forteo injections.  Also noted that pth ordered by her previous endocrinologist was low, but ca and vit d nl.  Defer to endocrine for further mgmt.    Counseled patient that if she is eating a well totally well-balanced/healthy diet that she does not need to supplement with vitamins, and that they are not FDA approved so could contain contaminants.    Return to clinic in 2 months to follow-up on neck muscle tightness and likely venous insufficiency.  Then for annual exam. then 12 mo for annual exam or prn.

## 2023-09-19 ENCOUNTER — TELEPHONE (OUTPATIENT)
Dept: PHYSICAL THERAPY | Facility: REHABILITATION | Age: 83
End: 2023-09-19
Payer: COMMERCIAL

## 2023-09-19 NOTE — OP THERAPY DISCHARGE SUMMARY
Outpatient Physical Therapy  DISCHARGE SUMMARY NOTE      [facility] [address]  [address]  [facility] [address]  Phone:  [phone]  Fax:  [phone]    Date of Visit: 09/19/2023    Patient: Antonio Treviño  YOB: 1940  MRN: [id number]   Referring Provider: EVELYNE Osborn M.D.  [address] [place] [address] [address]  [place] [address]   Referring Diagnosis Low back pain, unspecified [M54.50]        Functional Assessment Used        Your patient is being discharged from Physical Therapy with the following comments:   Patient last seen 5/2022. Primary therapist no longer working for [facility]. Appropriate to DC POC.     Beto Green, PT, DPT    Date: 9/19/2023

## 2023-10-09 ENCOUNTER — PHYSICAL THERAPY (OUTPATIENT)
Dept: PHYSICAL THERAPY | Facility: REHABILITATION | Age: 83
End: 2023-10-09
Attending: INTERNAL MEDICINE
Payer: COMMERCIAL

## 2023-10-09 DIAGNOSIS — M79.89 LEG SWELLING: ICD-10-CM

## 2023-10-09 PROCEDURE — 97162 PT EVAL MOD COMPLEX 30 MIN: CPT

## 2023-10-09 NOTE — OP THERAPY EVALUATION
"  Outpatient Physical Therapy  LYMPHEDEMA THERAPY INITIAL EVALUATION    [facility] [place] [address]  [address]  [facility] [address]  Phone:  [phone]  Fax:  [phone]    Date of Evaluation: 10/09/2023    Patient: Antonio Treviño  YOB: 1940  MRN: [id number]     Referring Provider: Bryce Stewart D.O.  [address] [place] [address]   Referring Diagnosis Venous insufficiency [I87.2]     Time Calculation    Start time: 1419  Stop time: 1459 Time Calculation (min): 40 minutes             Chief Complaint: Leg Swelling    Visit Diagnoses     ICD-10-CM   1. Leg swelling  M79.89       Subjective:   History of Present Illness:     Mechanism of injury:  Antonio reports when she stands around for a while her legs feel tight and hot and red. When she elevates, within an hour, her legs reduce. Seems to be happening for a few years. Has not worn medical grade compression, but tried athletic compression and felt it was too tight. Overnight and when she elevates, legs become normal. Feet are usually ok, just the calf and not over the knee. She likes to hike but unfortunately has stopped due to her leg pain and swelling. Antonio reports she underwent HERMINIA recently and it was found to be normal.       Past Medical History:   Diagnosis Date    Anesthesia     nausea   \"no narcotics\"    Bowel habit changes     constipation    Cataract     bilateral IOL    Dry eyes     Heart murmur     High cholesterol 11/02/2020    no medication    Hyperlipidemia     Left ovarian cyst 11/11/2020    Osteopenia     OSTEOPOROSIS     Thickened endometrium 11/11/2020    Vitiligo      Past Surgical History:   Procedure Laterality Date    HYSTERECTOMY LAPAROSCOPY  11/11/2020    Procedure: HYSTERECTOMY, LAPAROSCOPIC-TOTAL;  Surgeon: Max Villatoro M.D.;  Location: SURGERY SAME DAY [facility];  Service: Gynecology    SALPINGO OOPHORECTOMY Bilateral 11/11/2020    Procedure: SALPINGO-OOPHORECTOMY;  Surgeon: Max BELL" YUSRA Villatoro;  Location: SURGERY SAME DAY [facility];  Service: Gynecology    CYSTOSCOPY  11/11/2020    Procedure: CYSTOSCOPY-DIAGNOSTIC;  Surgeon: Max Villatoro M.D.;  Location: SURGERY SAME DAY [facility];  Service: Gynecology    OTHER  2020    laminectomy    CATARACT PHACO WITH IOL  7/2/2013    Performed by Bin Cardona M.D. at [facility]    TENDON TRANSFER       Social History     Tobacco Use    Smoking status: Never    Smokeless tobacco: Never   Substance Use Topics    Alcohol use: No     Family and Occupational History     Socioeconomic History    Marital status:      Spouse name: Not on file    Number of children: Not on file    Years of education: Not on file    Highest education level: Not on file   Occupational History    Not on file       Lymphedema Objective    Left Lower Extremity Circumferential Measurements  Waist: cm  Hip: cm  Scrotum: cm  Ground/Upper Thigh: cm  Mid Thigh: cm  Knee: 27 cm  Upper Calf: 30 cm  Mid Calf: 23.6 cm  Ankle: 18.3 cm  Heel to Foot: 19.3 cm  Total: 118.2 cm    Right Lower Extremity Circumferential Measurements  Waist: cm  Hip: cm  Scrotum: cm  Ground/Upper Thigh: cm  Mid Thigh: cm  Knee: 27.3 cm  Upper Calf: 30 cm  Mid Calf: 21.7 cm  Ankle: 18.6 cm  Heel to Foot: 20.4 cm  Total: 118 cm          Therapeutic Treatments and Modalities:     Therapeutic Treatment and Modalities Summary: Educated patient on pathogenesis of lymphedema. Distributed brochure. Patient has also been educated on skin care precautions including hygiene, lotions with pH 5-5.5, and avoidance of lacerations/mosquito bites/heat. Also educated pt on signs/symptoms of cellulitis.       Discussed the need for external compression and educated pt regarding compression garment options.  Patient was educated the optimal use of garment (all day, every day, especially during higher risk activities as discussed, remove at night).  Lymphedema risk factors were discussed with a lymphedema  "\"Do's and Don'ts\" handout provided.  Patient verbalized understanding to all education today.     Time-based treatments/modalities:           Assessment and Plan:   Functional Impairments: swelling    Assessment details:  Antonio is an 83yo F who reports suffering from swelling to her B LE for the past few years. The pattern of her swelling as well as current physical appearance suggests she has not yet developed true lymphedema and likely has symptoms of venous insufficiency. She will certainly benefit from use of compression socks daily and has been educated on this as well as provided with brand/sizing for them. Should the socks be of ill fit or insufficient at controlling her swelling on days she hikes long distances, then she has been invited to return for evaluation and will likely benefit from a more aggressive sock. She has been educated on all of the above and agreed to the plan.   Prognosis: good      Goals:   Short Term Goals:  1. Patient will obtain a compression garment for her distal B LE to allow for fluid removal from calves.  2. Pt will be able to hike 4mi while wearing compression and will not develop swelling to her B LE.     Short term goal time span:  2-4 weeks    Long Term Goals:  1. Pt will be able to hike >6mi in her compression socks without development of fluid buildup in her bilateral distal LE.   Long term goal time span:  4-6 weeks    Plan:  Therapy options:  Physical therapy treatment to continue  Planned therapy interventions:  Compression stocking, decongestive exercises, home exercise program, manual lymph drainage, patient education, self-care/training (CPT 74520), short stretch bandages and skin/wound care  Planned education:  Functional anatomy and physiology of the lymphatic system, pathophysiology of lymphedema, lymphedema exercise, lymphedema precautions, proper skin care/nutrition, compression bandaging, self massage, infection prevention, scar tissue management, activity " guidelines, dietary guidelines, skin care guidelines, home pump use, bandage removal and long term self-management of lymphedema  Frequency:  2x month  Duration in weeks:  8  Discussed with:  Patient      Functional Assessment Used    LLIS    Referring provider co-signature:  I have reviewed this plan of care and my co-signature certifies the need for services.    Certification Period: 10/09/2023 to  12/04/23    Physician Signature: ________________________________ Date: ______________

## 2023-10-23 ENCOUNTER — HOSPITAL ENCOUNTER (OUTPATIENT)
Dept: LAB | Facility: MEDICAL CENTER | Age: 83
End: 2023-10-23
Attending: INTERNAL MEDICINE
Payer: COMMERCIAL

## 2023-10-23 LAB
25(OH)D3 SERPL-MCNC: 68 NG/ML (ref 30–100)
CA-I SERPL-SCNC: 1.18 MMOL/L (ref 1.1–1.3)
CALCIUM SERPL-MCNC: 9.7 MG/DL (ref 8.4–10.2)
PTH-INTACT SERPL-MCNC: 25.3 PG/ML (ref 14–72)

## 2023-10-23 PROCEDURE — 83970 ASSAY OF PARATHORMONE: CPT

## 2023-10-23 PROCEDURE — 82306 VITAMIN D 25 HYDROXY: CPT

## 2023-10-23 PROCEDURE — 82310 ASSAY OF CALCIUM: CPT

## 2023-10-23 PROCEDURE — 82330 ASSAY OF CALCIUM: CPT

## 2023-10-23 PROCEDURE — 36415 COLL VENOUS BLD VENIPUNCTURE: CPT

## 2023-11-10 ENCOUNTER — OFFICE VISIT (OUTPATIENT)
Dept: INTERNAL MEDICINE | Facility: OTHER | Age: 83
End: 2023-11-10
Payer: COMMERCIAL

## 2023-11-10 VITALS
BODY MASS INDEX: 19.98 KG/M2 | HEIGHT: 61 IN | DIASTOLIC BLOOD PRESSURE: 62 MMHG | OXYGEN SATURATION: 98 % | HEART RATE: 73 BPM | TEMPERATURE: 97.1 F | SYSTOLIC BLOOD PRESSURE: 112 MMHG | WEIGHT: 105.8 LBS

## 2023-11-10 DIAGNOSIS — M62.838 NECK MUSCLE SPASM: ICD-10-CM

## 2023-11-10 DIAGNOSIS — R03.0 ELEVATED BP WITHOUT DIAGNOSIS OF HYPERTENSION: ICD-10-CM

## 2023-11-10 DIAGNOSIS — R25.2 CRAMPS OF LOWER EXTREMITY: ICD-10-CM

## 2023-11-10 DIAGNOSIS — I87.2 VENOUS INSUFFICIENCY: ICD-10-CM

## 2023-11-10 PROCEDURE — 1126F AMNT PAIN NOTED NONE PRSNT: CPT | Performed by: INTERNAL MEDICINE

## 2023-11-10 PROCEDURE — 3078F DIAST BP <80 MM HG: CPT | Performed by: INTERNAL MEDICINE

## 2023-11-10 PROCEDURE — 99214 OFFICE O/P EST MOD 30 MIN: CPT | Performed by: INTERNAL MEDICINE

## 2023-11-10 PROCEDURE — 3074F SYST BP LT 130 MM HG: CPT | Performed by: INTERNAL MEDICINE

## 2023-11-10 ASSESSMENT — PAIN SCALES - GENERAL: PAINLEVEL: NO PAIN

## 2023-11-10 NOTE — PROGRESS NOTES
Subjective     Antonio Treviño is a 81 y.o. female who presents with Chronic Venous Insufficiency (Patient here for follow up) and Numbness (Numbness and tingling feet)            HPI   82 year old woman with spinal stenosis s/p laminectomy complicated by periperal neuropathy, also with compression fracture - followed by neuro, osteoporosis on forteo followed by endo, vitelligo, hx bppv s/p Epley with resolution, improved dry eye in setting of prior eye surgery, L wrist sprain s/p surgery and subsequent fracture, functional murmur (prev evaluted by cardio and echo per report) here for b/l leg tightness and neck /back of head pressure..    Please see a/p.    ROS           Objective     VSS    Physical Exam  General: Pleasant woman in no apparent distress, alert, responding appropriately              Assessment & Plan     Neck pressure likely 2/2 paraspinal/trapezium muscle spasms and poor posture/biomechanics  -Prev: No alarm symptoms.  Significant neck limitation of motion in addition to tight trapezius musculature along with poor biomechanics while reading and baseline hunched posture are likely contributing to neck spasms which are for what ever reason exacerbated when she is standing and walking.    Plan  -Prev: Home physical therapy for neck mobilization, handout given, also demonstrated some exercises for patient and recommended massage, also yoga.  Offered formal PT, patient prefers to try home recommendations first.  -Counseled on ergonomics and proper posture  -Follow-up 2 months  Today: notes improved stretching, hot tub, only occasionally occurring, folow clinically.    Leg pressure likely 2/2 mild venous insufficeincy  -Prev: Varicosities on legs and symptoms are consistent with possible mild venous insufficiency.-Discussed pathophysiology.    Today: tried compression stockings via lymphedema clinic, did not help and perhaps worsened.  As an aside, reports prior HERMINIA nl.  Overall daily sx but improved with  stretching, exercise, massage, leg elevation.  Sx may have some overlap with neuropathy sx from prior back surgery, as states today that sx are going above knees.  Overall sx not significantly bothersome per patient, will follow clinically for now.    Calf muscle cramps, etiology unclear  -Continue stretching and hydration  -Potassium magnesium and calcium all normal, discussed with patient  -Patient has tried taking a multivitamin with B vitamins, and in fact her B6 vitamin level was previously supratherapeutic, she reports no change in Symptomatology with this, as such likely no utility in trialing B vitamin supplementation.  Discussed possibility of using diltiazem, patient politely declines at present, stating that the cramps are only lasting a few minutes when they occur and that overall they are tolerable.  Discussed that we could revisit this if worsens, and that quinidine is another option although has a very significant side effect profile and would not recommend unless cramps are debilitating.  -subsequent visit:Patient reports calf cramping is only intermittently occurring, is doing the stretches daily and feels these are potentially helping.  Not otherwise addressed.  -today:. Re[rpts improved with calf strecthing, hot tub.  Occasionally happenig now, not significantly bothering patient, follow clinnically    Isolated elevated bp in evenings, etio unclear  -mentioned near end of visit.  No lightheadedness, chest pain, shortness of breath, motor weakness, speech issues, facial droop. Only occurring in evenings, bp at goal per report in morning and afternoon, also at goal here.  Denies any caffeine/stimulant/alcohol use in evenings, no supplement use, not in pain, does not notice significant anxiety preceeding.  States occurs after sitting of 2 hours watching tv, which is unexpected.  ? Mild autonomic stimulation/compression in setting of prior back surgery vs idiopathic labile bp.    Plan  -overall bp is  at goal here and at home, bp readings at home max sbp 150-160s, thus not critical range.  Suggested possiblity of formal 24hr ambulatory bp monitoring to better characterize vs patient trial of different chair and self monitoring, patient prefers home monitoring with chair other/modificaitons.  She will log bp 2x daily, f/u 1 month.    From Prior Visit, not addressed:      For next visit:    Healthcare Maintenance (from last visit, not yet covered)    Suggest fit testing if wishes to continue, depression/anxiety/caregiver/ a1c screening/constipation.    Colonoscopy: 2017, GI following and she diann lschedule w/I next year, denies stool changes  HIV: neg 2022  Syphilis: neg 2022  Hep C: neg 2022  A1c: fbs previously 100, a1c 5.6, pt acctively working on diet/exercise.  Check a1c for monioring  ASCVD: prev discussed, pt prefers diet/exercise focus which is reasonable as her elevated ascvd largely age related, also discus coronary artery scoring/ct, pt prefers to observe with diet/exercise at present.  Denies cp/sob with exertion.  Smoking: never  Alcohol Use: never  Recreational Drugs: never  Healthy Eating/Exercise: discussed veg inclined diet evidence based and exericse, patient is very physically active doing 1 hour walks, aquatherapy/exercise, home PT exercises, praised patients healthy efforts  Depression and Anxiety: neg phq2.  States some anxiety about age and futrure and what will happen to her  (who has dementia) if she was to pass away. States feeling ok overall though, declcines making visit to discuss further.  Offered to help support the patient at any time such as with coordinating support for care for her .  Immunizations: utd covid, pna, tdap, shingrix, flu shot    Mammogram: nl 2022, repeat 2024 if wishes.  PAP: s/p no non-cancer, prior pap nl, no further monitoring needed  DEXA: osteoporosis on tx per endo, apprecite support  Goals of Care: has advanced directive        History of  functional murmur  -Echo had only mild valvular abnormalities and aortic sclerosis, otherwise normal.  Discussed with patient, continue diet and exercise, no further intervention needed.  Likely flow murmur.    Mild leukopenia, etiology unclear  -Patient previously denied fevers, chills, night sweats, well-appearing, weight is stable in EMR.  Was previously slightly elevated at 13.2, now 4.3.  since drawing labs as above, will repeat cbc with manual diff to trend wbc.  -repeta cbc nl, follow clinically.    Prediabtes, resolved  -Resolved on repeat A1c, praised patient, continue diet and exercise.    ASCVD discussion, began and partially discussed.  -LDL is 151, given that the patient is not diabetic, has no history of cardiovascular disease, is a non-smoker, and is not hypertensive, her elevated ASCVD would be mostly age-related at this point.  Discussed with patient that overall healthy lifestyle interventions would be the most effective and appropriate thing to reduce her risk of cardiovascular disease, patient in agreement with this plan.  At follow-up can complete this discussion but discussing the age component of the ASCVD elevation and her preference to treat this versus not with statin therapy.    Constipation, improved  -Not fully addressed today.  Patient reports daily bowel movements although they are somewhat hard, states that she is eating increased fiber and using MiraLAX as needed.  Offered to discuss further at future visit, patient states she is fine with monitoring for now.     Spinal stenosis - appreciate neuro support, encourage patient to follow-up with them regarding any future management of her gabapentin or symptoms.    Endocrine referral placed per patient request for injectible osteoporosis therapy to re-establish, apprecirae support.  Counseled that need for repeating DEXA scans on medical therapy for osteoporosis is not clear, probably would not benefit with repeat before 5 years, but  will defer to endocrine given they are the primary team managing her Forteo injections.  Also noted that pth ordered by her previous endocrinologist was low, but ca and vit d nl.  Defer to endocrine for further mgmt.    Counseled patient that if she is eating a well totally well-balanced/healthy diet that she does not need to supplement with vitamins, and that they are not FDA approved so could contain contaminants.    Return to clinic 1 month to f/u elevated evening bp readings and annual exam. then q12 mo for annual exam or prn.

## 2023-11-20 ENCOUNTER — APPOINTMENT (OUTPATIENT)
Dept: RADIOLOGY | Facility: MEDICAL CENTER | Age: 83
End: 2023-11-20
Attending: EMERGENCY MEDICINE
Payer: COMMERCIAL

## 2023-11-20 ENCOUNTER — HOSPITAL ENCOUNTER (EMERGENCY)
Facility: MEDICAL CENTER | Age: 83
End: 2023-11-20
Attending: EMERGENCY MEDICINE
Payer: COMMERCIAL

## 2023-11-20 VITALS
SYSTOLIC BLOOD PRESSURE: 155 MMHG | HEART RATE: 74 BPM | BODY MASS INDEX: 18.91 KG/M2 | TEMPERATURE: 98 F | RESPIRATION RATE: 18 BRPM | OXYGEN SATURATION: 97 % | DIASTOLIC BLOOD PRESSURE: 63 MMHG | WEIGHT: 106.7 LBS | HEIGHT: 63 IN

## 2023-11-20 DIAGNOSIS — G44.209 ACUTE NON INTRACTABLE TENSION-TYPE HEADACHE: ICD-10-CM

## 2023-11-20 DIAGNOSIS — I10 PRIMARY HYPERTENSION: ICD-10-CM

## 2023-11-20 LAB
ALBUMIN SERPL BCP-MCNC: 4.5 G/DL (ref 3.2–4.9)
ALBUMIN/GLOB SERPL: 1.6 G/DL
ALP SERPL-CCNC: 78 U/L (ref 30–99)
ALT SERPL-CCNC: 15 U/L (ref 2–50)
ANION GAP SERPL CALC-SCNC: 11 MMOL/L (ref 7–16)
AST SERPL-CCNC: 21 U/L (ref 12–45)
BASOPHILS # BLD AUTO: 0.6 % (ref 0–1.8)
BASOPHILS # BLD: 0.04 K/UL (ref 0–0.12)
BILIRUB SERPL-MCNC: 0.3 MG/DL (ref 0.1–1.5)
BUN SERPL-MCNC: 20 MG/DL (ref 8–22)
CALCIUM ALBUM COR SERPL-MCNC: 8.9 MG/DL (ref 8.5–10.5)
CALCIUM SERPL-MCNC: 9.3 MG/DL (ref 8.4–10.2)
CHLORIDE SERPL-SCNC: 95 MMOL/L (ref 96–112)
CO2 SERPL-SCNC: 25 MMOL/L (ref 20–33)
CREAT SERPL-MCNC: 0.65 MG/DL (ref 0.5–1.4)
EKG IMPRESSION: NORMAL
EOSINOPHIL # BLD AUTO: 0.05 K/UL (ref 0–0.51)
EOSINOPHIL NFR BLD: 0.8 % (ref 0–6.9)
ERYTHROCYTE [DISTWIDTH] IN BLOOD BY AUTOMATED COUNT: 41.6 FL (ref 35.9–50)
GFR SERPLBLD CREATININE-BSD FMLA CKD-EPI: 87 ML/MIN/1.73 M 2
GLOBULIN SER CALC-MCNC: 2.8 G/DL (ref 1.9–3.5)
GLUCOSE SERPL-MCNC: 101 MG/DL (ref 65–99)
HCT VFR BLD AUTO: 42.2 % (ref 37–47)
HGB BLD-MCNC: 14.8 G/DL (ref 12–16)
IMM GRANULOCYTES # BLD AUTO: 0.03 K/UL (ref 0–0.11)
IMM GRANULOCYTES NFR BLD AUTO: 0.5 % (ref 0–0.9)
LYMPHOCYTES # BLD AUTO: 1.05 K/UL (ref 1–4.8)
LYMPHOCYTES NFR BLD: 16.2 % (ref 22–41)
MCH RBC QN AUTO: 33.5 PG (ref 27–33)
MCHC RBC AUTO-ENTMCNC: 35.1 G/DL (ref 32.2–35.5)
MCV RBC AUTO: 95.5 FL (ref 81.4–97.8)
MONOCYTES # BLD AUTO: 0.52 K/UL (ref 0–0.85)
MONOCYTES NFR BLD AUTO: 8 % (ref 0–13.4)
NEUTROPHILS # BLD AUTO: 4.8 K/UL (ref 1.82–7.42)
NEUTROPHILS NFR BLD: 73.9 % (ref 44–72)
NRBC # BLD AUTO: 0 K/UL
NRBC BLD-RTO: 0 /100 WBC (ref 0–0.2)
PLATELET # BLD AUTO: 286 K/UL (ref 164–446)
PMV BLD AUTO: 9.8 FL (ref 9–12.9)
POTASSIUM SERPL-SCNC: 3.9 MMOL/L (ref 3.6–5.5)
PROT SERPL-MCNC: 7.3 G/DL (ref 6–8.2)
RBC # BLD AUTO: 4.42 M/UL (ref 4.2–5.4)
SODIUM SERPL-SCNC: 131 MMOL/L (ref 135–145)
WBC # BLD AUTO: 6.5 K/UL (ref 4.8–10.8)

## 2023-11-20 PROCEDURE — 96374 THER/PROPH/DIAG INJ IV PUSH: CPT

## 2023-11-20 PROCEDURE — 80053 COMPREHEN METABOLIC PANEL: CPT

## 2023-11-20 PROCEDURE — 700111 HCHG RX REV CODE 636 W/ 250 OVERRIDE (IP)

## 2023-11-20 PROCEDURE — 71275 CT ANGIOGRAPHY CHEST: CPT

## 2023-11-20 PROCEDURE — 96375 TX/PRO/DX INJ NEW DRUG ADDON: CPT

## 2023-11-20 PROCEDURE — 700111 HCHG RX REV CODE 636 W/ 250 OVERRIDE (IP): Performed by: EMERGENCY MEDICINE

## 2023-11-20 PROCEDURE — 93005 ELECTROCARDIOGRAM TRACING: CPT

## 2023-11-20 PROCEDURE — 36415 COLL VENOUS BLD VENIPUNCTURE: CPT

## 2023-11-20 PROCEDURE — 93005 ELECTROCARDIOGRAM TRACING: CPT | Performed by: EMERGENCY MEDICINE

## 2023-11-20 PROCEDURE — 85025 COMPLETE CBC W/AUTO DIFF WBC: CPT

## 2023-11-20 PROCEDURE — 700117 HCHG RX CONTRAST REV CODE 255: Performed by: EMERGENCY MEDICINE

## 2023-11-20 PROCEDURE — 99284 EMERGENCY DEPT VISIT MOD MDM: CPT

## 2023-11-20 PROCEDURE — 71045 X-RAY EXAM CHEST 1 VIEW: CPT

## 2023-11-20 RX ORDER — HYDRALAZINE HYDROCHLORIDE 20 MG/ML
20 INJECTION INTRAMUSCULAR; INTRAVENOUS ONCE
Status: COMPLETED | OUTPATIENT
Start: 2023-11-20 | End: 2023-11-20

## 2023-11-20 RX ORDER — DIAZEPAM 5 MG/ML
2.5 INJECTION, SOLUTION INTRAMUSCULAR; INTRAVENOUS ONCE
Status: COMPLETED | OUTPATIENT
Start: 2023-11-20 | End: 2023-11-20

## 2023-11-20 RX ORDER — LISINOPRIL 10 MG/1
10 TABLET ORAL
Qty: 30 TABLET | Refills: 0 | Status: SHIPPED | OUTPATIENT
Start: 2023-11-20 | End: 2023-12-13

## 2023-11-20 RX ORDER — DIAZEPAM 5 MG/ML
INJECTION, SOLUTION INTRAMUSCULAR; INTRAVENOUS
Status: COMPLETED
Start: 2023-11-20 | End: 2023-11-20

## 2023-11-20 RX ADMIN — DIAZEPAM 2.5 MG: 5 INJECTION, SOLUTION INTRAMUSCULAR; INTRAVENOUS at 20:00

## 2023-11-20 RX ADMIN — IOHEXOL 75 ML: 350 INJECTION, SOLUTION INTRAVENOUS at 20:23

## 2023-11-20 RX ADMIN — HYDRALAZINE HYDROCHLORIDE 20 MG: 20 INJECTION, SOLUTION INTRAMUSCULAR; INTRAVENOUS at 19:28

## 2023-11-21 NOTE — ED PROVIDER NOTES
ED Provider Note    CHIEF COMPLAINT  Chief Complaint   Patient presents with    Headache     Patient report of discomfort to the back of her neck and head pressure with throbbing pain this morning and she checked her blood pressure and it was 195/95 at 5 pm.  Patient took 100 mg of gabapentin at 5 pm to help her relax her nerves.  Patient denies having chest pain and initially felt short of breath but not right now.  Patient able to move her neck up and down, side to side with out any problems     Blood Pressure Problem       EXTERNAL RECORDS REVIEWED  Outpatient PCP note 11/10/2023, evaluated for many things including hypertension, scheduled 1 month follow-up for hypertension without medication changes, home medications include no antihypertensives    HPI/JAMEL Josephmelba Treviño is a 82 y.o. female who presents for evaluation of headache and neck pain, she reports every time her blood pressure spikes she gets the symptoms.  She checked her blood pressure at home today and it was 150-160 systolic.  She recently went to her PCP with the symptoms, was not started on any antihypertensive medication because she reports in the morning her blood pressure is typically normal.  No chest pain.  Minimal shortness of breath.  No focal weakness numbness or tingling and no visual changes.    PAST MEDICAL HISTORY   has a past medical history of Anesthesia, Bowel habit changes, Cataract, Dry eyes, Heart murmur, High cholesterol (11/02/2020), Hyperlipidemia, Left ovarian cyst (11/11/2020), Osteopenia, OSTEOPOROSIS, Thickened endometrium (11/11/2020), and Vitiligo.    SURGICAL HISTORY   has a past surgical history that includes cataract phaco with iol (7/2/2013); tendon transfer; other (2020); hysterectomy laparoscopy (11/11/2020); salpingo oophorectomy (Bilateral, 11/11/2020); and cystoscopy (11/11/2020).    FAMILY HISTORY  History reviewed. No pertinent family history.    SOCIAL HISTORY  Social History     Tobacco Use    Smoking  "status: Never    Smokeless tobacco: Never   Vaping Use    Vaping Use: Never used   Substance and Sexual Activity    Alcohol use: No    Drug use: No    Sexual activity: Not on file       CURRENT MEDICATIONS  Home Medications       Reviewed by Patria Brooks R.N. (Registered Nurse) on 11/20/23 at 1757  Med List Status: Complete     Medication Last Dose Status   Calcium Carbonate 1500 (600 Ca) MG Tab 11/20/2023 Active   Cholecalciferol (VITAMIN D HIGH POTENCY PO) 11/20/2023 Active   gabapentin (NEURONTIN) 100 MG Cap 11/20/2023 Active   Turmeric (QC TUMERIC COMPLEX PO) 11/20/2023 Active                    ALLERGIES  Allergies   Allergen Reactions    Codeine      Nausea and vommiting  Other reaction(s): nausea and vomiting    Morphine      Nausea amd vomitting  Other reaction(s): nausea and vomiting       PHYSICAL EXAM  VITAL SIGNS: BP (!) 186/95   Pulse 75   Temp 36.7 °C (98 °F) (Temporal)   Resp 18   Ht 1.6 m (5' 3\")   Wt 48.4 kg (106 lb 11.2 oz)   SpO2 98%   BMI 18.90 kg/m²    Constitutional: Well appearing patient in no acute distress.  Awake and alert, not toxic nor ill in appearance.  HENT: Normocephalic, no obvious evidence of acute trauma.  Eyes: No scleral icterus. Normal conjunctiva   Thorax & Lungs: Normal nonlabored respirations. I appreciate no wheezing, rhonchi or rales. There is normal air movement.  Upon cardiac ascultation I appreciate a regular heart rhythm and a normal rate.   Abdomen: The abdomen is not visibly distended. Upon palpation, I find it to be without tenderness.  No mass appreciated.  Skin: The exposed portions of skin reveal no obvious rash or other abnormalities.  Extremities/Musculoskeletal: No obvious sign of acute trauma. No asymmetric calf tenderness or edema.   Neurologic: Alert & oriented. No focal deficits observed.      DIAGNOSTIC STUDIES / PROCEDURES    LABS  Results for orders placed or performed during the hospital encounter of 11/20/23   CBC WITH DIFFERENTIAL   Result " Value Ref Range    WBC 6.5 4.8 - 10.8 K/uL    RBC 4.42 4.20 - 5.40 M/uL    Hemoglobin 14.8 12.0 - 16.0 g/dL    Hematocrit 42.2 37.0 - 47.0 %    MCV 95.5 81.4 - 97.8 fL    MCH 33.5 (H) 27.0 - 33.0 pg    MCHC 35.1 32.2 - 35.5 g/dL    RDW 41.6 35.9 - 50.0 fL    Platelet Count 286 164 - 446 K/uL    MPV 9.8 9.0 - 12.9 fL    Neutrophils-Polys 73.90 (H) 44.00 - 72.00 %    Lymphocytes 16.20 (L) 22.00 - 41.00 %    Monocytes 8.00 0.00 - 13.40 %    Eosinophils 0.80 0.00 - 6.90 %    Basophils 0.60 0.00 - 1.80 %    Immature Granulocytes 0.50 0.00 - 0.90 %    Nucleated RBC 0.00 0.00 - 0.20 /100 WBC    Neutrophils (Absolute) 4.80 1.82 - 7.42 K/uL    Lymphs (Absolute) 1.05 1.00 - 4.80 K/uL    Monos (Absolute) 0.52 0.00 - 0.85 K/uL    Eos (Absolute) 0.05 0.00 - 0.51 K/uL    Baso (Absolute) 0.04 0.00 - 0.12 K/uL    Immature Granulocytes (abs) 0.03 0.00 - 0.11 K/uL    NRBC (Absolute) 0.00 K/uL   COMP METABOLIC PANEL   Result Value Ref Range    Sodium 131 (L) 135 - 145 mmol/L    Potassium 3.9 3.6 - 5.5 mmol/L    Chloride 95 (L) 96 - 112 mmol/L    Co2 25 20 - 33 mmol/L    Anion Gap 11.0 7.0 - 16.0    Glucose 101 (H) 65 - 99 mg/dL    Bun 20 8 - 22 mg/dL    Creatinine 0.65 0.50 - 1.40 mg/dL    Calcium 9.3 8.4 - 10.2 mg/dL    Correct Calcium 8.9 8.5 - 10.5 mg/dL    AST(SGOT) 21 12 - 45 U/L    ALT(SGPT) 15 2 - 50 U/L    Alkaline Phosphatase 78 30 - 99 U/L    Total Bilirubin 0.3 0.1 - 1.5 mg/dL    Albumin 4.5 3.2 - 4.9 g/dL    Total Protein 7.3 6.0 - 8.2 g/dL    Globulin 2.8 1.9 - 3.5 g/dL    A-G Ratio 1.6 g/dL   ESTIMATED GFR   Result Value Ref Range    GFR (CKD-EPI) 87 >60 mL/min/1.73 m 2   EKG   Result Value Ref Range    Report       [facility]    Test Date:  2023  Pt Name:    RAJ MILLER                  Department: [facility]  MRN:        [id number]                      Room:  Gender:     Female                       Technician: [id number]  :        [id number]                   Requested By:ER TRIAGE  PROTOCOL  Order #:    [id number]                    Reading MD: KIRAN CHOWDHURY MD    Measurements  Intervals                                Axis  Rate:       74                           P:          17  IN:         215                          QRS:        -23  QRSD:       94                           T:          22  QT:         420  QTc:        466    Interpretive Statements  Sinus rhythm  Borderline prolonged IN interval  Borderline left axis deviation  Compared to ECG 11/04/2020 10:00:57  No significant changes  I independently interpreted this EKG  Electronically Signed On 11- 18:52:11 PST by KIRAN CHOWDHURY MD          RADIOLOGY  I have independently interpreted the diagnostic imaging associated with this visit and am waiting the final reading from the radiologist.   My preliminary interpretation is as follows: No dissection  Radiologist interpretation:   CT-CTA COMPLETE THORACOABDOMINAL AORTA   Final Result      1.  Negative for aortic aneurysm or dissection      2.  Mild atherosclerotic plaque      3.  Minimal ectasia of the proximal descending thoracic aorta measuring 3.5 cm      4.  Incidental renal cysts      5.  Increase in expected amount of stool throughout the colon      6.  No follow up imaging is recommended per consensus guidelines of the 2019 ACR Incidental Findings Committee for probably benign incidental simple appearing renal cystic lesion(s) based on imaging criteria.                        DX-CHEST-LIMITED (1 VIEW)   Final Result      No acute cardiopulmonary disease.            COURSE & MEDICAL DECISION MAKING    ED Observation Status? No; Patient does not meet criteria for ED Observation.     INITIAL ASSESSMENT, COURSE AND PLAN  Care Narrative: 82-year-old female coming with hypertension.  She has no history of, not on antihypertensive medications.  This been going on for a little while now, she states that every time her blood pressure spikes up she will get a headache and neck  pain.  She states that the symptoms resolved when her blood pressure improved.  Typically in the morning her blood pressure is normal, throughout the day her blood pressure escalates.  She went to her PCP, has a 1 month follow-up and was not started on any antihypertensive.  No focal neurologic complaints or findings on exam, no chest pain but does at times feel somewhat short of breath.  Her vital signs here reveal a significant hypertension, initially 215/100, she is not hypoxic, breath sounds are clear.  Her EKG is nonischemic.  Will obtain blood work to evaluate the possibility of significant renal dysfunction, electrolyte abnormalities, hepatic dysfunction, will check blood counts as well to exclude anemia.  A chest x-ray will be obtained to exclude infiltrates or pneumothorax and she will be reevaluated after a dose of hydralazine    I was called to evaluate the patient, she is having generalized body wide cramping of all of her muscles, including her back down into her legs.  For this reason a stat CTA of the aorta is obtained excluding aortic dissection.  She did receive a small dose of Valium, 2.5 mg intravenously resolving her cramping.  Her blood pressure is 150/60.  Headache is improving.  At this point going to prescribe lisinopril, 10 mg.  She will take this in the morning if her systolic blood pressures greater than 140.  At this point she is discharged in stable condition with strict return instructions and follow-up already scheduled with PCP.  Prescription for lisinopril    DISPOSITION AND DISCUSSIONS    Escalation of care considered, and ultimately not performed: I did consider escalation to acute inpatient management given her advanced age and hypertension but she is feeling better with improved blood pressure, she will do well as an outpatient      FINAL DIAGNOSIS  1. Primary hypertension    2. Acute non intractable tension-type headache           Electronically signed by: Chiki Schmidt  M.D., 11/20/2023 6:48 PM

## 2023-11-21 NOTE — ED TRIAGE NOTES
"82 yr old female to triage  Chief Complaint   Patient presents with    Headache     Patient report of discomfort to the back of her neck and head pressure with throbbing pain this morning and she checked her blood pressure and it was 195/95 at 5 pm.  Patient took 100 mg of gabapentin at 5 pm to help her relax her nerves.  Patient denies having chest pain and initially felt short of breath but not right now.  Patient able to move her neck up and down, side to side with out any problems     Blood Pressure Problem     BP (!) 213/102   Pulse 76   Temp 36.6 °C (97.9 °F) (Temporal)   Resp 18   Ht 1.6 m (5' 3\")   Wt 48.4 kg (106 lb 11.2 oz)   SpO2 99%   BMI 18.90 kg/m²     EKG ordered.   "

## 2023-12-08 ENCOUNTER — HOSPITAL ENCOUNTER (OUTPATIENT)
Dept: RADIOLOGY | Facility: MEDICAL CENTER | Age: 83
End: 2023-12-08
Attending: SPECIALIST
Payer: COMMERCIAL

## 2023-12-08 DIAGNOSIS — R51.9 FACIAL PAIN: ICD-10-CM

## 2023-12-08 PROCEDURE — 70450 CT HEAD/BRAIN W/O DYE: CPT

## 2023-12-13 ENCOUNTER — OFFICE VISIT (OUTPATIENT)
Dept: INTERNAL MEDICINE | Facility: OTHER | Age: 83
End: 2023-12-13
Payer: COMMERCIAL

## 2023-12-13 VITALS
OXYGEN SATURATION: 98 % | WEIGHT: 103.8 LBS | HEART RATE: 75 BPM | DIASTOLIC BLOOD PRESSURE: 72 MMHG | SYSTOLIC BLOOD PRESSURE: 131 MMHG | HEIGHT: 61 IN | TEMPERATURE: 97.2 F | BODY MASS INDEX: 19.6 KG/M2

## 2023-12-13 DIAGNOSIS — Z13.220 SCREENING CHOLESTEROL LEVEL: ICD-10-CM

## 2023-12-13 DIAGNOSIS — E87.1 HYPONATREMIA: ICD-10-CM

## 2023-12-13 DIAGNOSIS — F41.9 ANXIETY: ICD-10-CM

## 2023-12-13 DIAGNOSIS — R00.0 TACHYCARDIA: ICD-10-CM

## 2023-12-13 DIAGNOSIS — I10 HYPERTENSION, UNSPECIFIED TYPE: ICD-10-CM

## 2023-12-13 PROCEDURE — 3075F SYST BP GE 130 - 139MM HG: CPT | Performed by: INTERNAL MEDICINE

## 2023-12-13 PROCEDURE — 3078F DIAST BP <80 MM HG: CPT | Performed by: INTERNAL MEDICINE

## 2023-12-13 PROCEDURE — 1126F AMNT PAIN NOTED NONE PRSNT: CPT | Performed by: INTERNAL MEDICINE

## 2023-12-13 PROCEDURE — 99214 OFFICE O/P EST MOD 30 MIN: CPT | Performed by: INTERNAL MEDICINE

## 2023-12-13 RX ORDER — AMLODIPINE BESYLATE 5 MG/1
5 TABLET ORAL DAILY
Qty: 60 TABLET | Refills: 0 | Status: SHIPPED
Start: 2023-12-13 | End: 2024-01-11

## 2023-12-13 ASSESSMENT — PAIN SCALES - GENERAL: PAINLEVEL: NO PAIN

## 2023-12-13 ASSESSMENT — ENCOUNTER SYMPTOMS: GENERAL WELL-BEING: FAIR

## 2023-12-13 ASSESSMENT — ACTIVITIES OF DAILY LIVING (ADL): BATHING_REQUIRES_ASSISTANCE: 0

## 2023-12-13 ASSESSMENT — PATIENT HEALTH QUESTIONNAIRE - PHQ9: CLINICAL INTERPRETATION OF PHQ2 SCORE: 0

## 2023-12-13 ASSESSMENT — FIBROSIS 4 INDEX: FIB4 SCORE: 1.59

## 2023-12-13 NOTE — PROGRESS NOTES
Subjective     Antonio Treviño is a 81 y.o. female who presents with Medicare Annual Wellness (Patient here for annual medicare wellness)            HPI   82 year old woman with spinal stenosis s/p laminectomy complicated by periperal neuropathy, also with compression fracture - followed by neuro, osteoporosis on forteo followed by endo, ramon, hx bppv s/p Epley with resolution, improved dry eye in setting of prior eye surgery, L wrist sprain s/p surgery and subsequent fracture, functional murmur (echo confirmed) here for b/l HTN f/u and anxiety.    Seen in ED for HTN and HA, CT head no acute process, started on lisinopril 10 in ED.    Please see a/p.    ROS           Objective     VS bp 131/72.  Home bps 120s-130s sbp in am, 130s-140s sbp in pms, occasional 150s on lisinopril. Dbp 70s-90s    Physical Exam  General: Pleasant woman in no apparent distress, alert, responding appropriately.   accompanies  CV:  2-3/6 moderately harsh tyrel at 2icsRSB similar to prior, no rg, rrr  Pulm: grossly ctab  Ext: no le edema  Neuro: smile symmetric, no pronator drift, speech nl  Psych: Mood is congruent with affect, dressed appropriately, speech is not pressured, no flight of ideas, thought process is organized, no psychomotor slowing or agitation.                Assessment & Plan     HTN  -No lightheadedness, chest pain, shortness of breath, motor weakness, speech issues, facial droop.  -benign cv (known tyrel present) and stroke screens  -bp usually near goal in ams on home bp but in late afternoons is frequently above goal with sbp 130s-150s on lisinopril 10 started in ED.  Does have new cough likely 2/2 lisinopril as denies sx c/w post nasal drip, gerd, or wheezing/sob symptoms or worsening at night c/w asthma     Plan  -stop lisinopril 10  -start amlodipine 5  -f/u 2-4 weeks, log bp 3 day max.  Call if lightheaded  -Cautioned any chest pain or shortness of breath > 5 minutes, any significant motor weakness, speech  "issues, or facial droop, cautioned patient to report to the emergency department immediately.      Anxiety with panic attacks  -no SI/HI/AH/VH.  Patient reports \"all her life\" she has been anxious.  She notes worrying about many things in general, although denies worrying about \"the little things\".  She states that she will begin to ruminate on things and then at times this will trigger periods where she has a fast heart rate, develops a headache, and feels significantly anxious for a period of 1 to 2 hours, where this then subsides.  Denies obsessions or rituals thus OCD unlikely, denies embarrassment around socialization thus social anxiety disorder unlikely, denies concern about \"escaping to safety\" thus agoraphobia unlikely, do need to clarify whether there is an inciting traumatic event.  However, symptoms seem diffuse.  PHQ 2 negative making depression unlikely.  Symptoms are likely consistent with panic attacks with possible generalized anxiety disorder versus elevated anxiety      Plan  -continue diet/exercise  -discussed pathophysiology of anxiety/panic attacks generally, rec cbt resources.  Anxiety resournces hangout given.  -Safety contract reviewed  -Follow-up next visit on CBT resources and also jarred screen    Previously disucssed likely tension headaches, noted CT head neg in recent ED visit, f/u future visit.    Check Tsh to eval for anxiety contribution as patient inquiring about this as possible etio, although doubt given overall picture is the etiology.  Check bmp to trend mild hyponatremia in setting of ED visit for headache.  Check lipid panel per patient request although one was done within last 1-2 years, can follow up with ascvd discussion at annual exam.      From Prior Visit, not addressed:        Healthcare Maintenance (from last visit, not yet covered)    Suggest fit testing if wishes to continue, depression/anxiety/caregiver/ a1c screening/constipation.    Colonoscopy: 2017, GI following " and she diann lschedule w/I next year, denies stool changes  HIV: neg 2022  Syphilis: neg 2022  Hep C: neg 2022  A1c: fbs previously 100, a1c 5.6, pt acctively working on diet/exercise.  Check a1c for monioring  ASCVD: prev discussed, pt prefers diet/exercise focus which is reasonable as her elevated ascvd largely age related, also discus coronary artery scoring/ct, pt prefers to observe with diet/exercise at present.  Denies cp/sob with exertion.  Smoking: never  Alcohol Use: never  Recreational Drugs: never  Healthy Eating/Exercise: discussed veg inclined diet evidence based and exericse, patient is very physically active doing 1 hour walks, aquatherapy/exercise, home PT exercises, praised patients healthy efforts  Depression and Anxiety: neg phq2.  States some anxiety about age and future and what will happen to her  (who has dementia) if she was to pass away. States feeling ok overall though, declcines making visit to discuss further.  Offered to help support the patient at any time such as with coordinating support for care for her .  Immunizations: utd covid, pna, tdap, shingrix, flu shot    Mammogram: nl 2022, repeat 2024 if wishes.  PAP: s/p no non-cancer, prior pap nl, no further monitoring needed  DEXA: osteoporosis on tx per endo, apprecite support  Goals of Care: has advanced directive              Neck pressure likely 2/2 paraspinal/trapezium muscle spasms and poor posture/biomechanics  -Prev: No alarm symptoms.  Significant neck limitation of motion in addition to tight trapezius musculature along with poor biomechanics while reading and baseline hunched posture are likely contributing to neck spasms which are for what ever reason exacerbated when she is standing and walking.    Plan  -Prev: Home physical therapy for neck mobilization, handout given, also demonstrated some exercises for patient and recommended massage, also yoga.  Offered formal PT, patient prefers to try home  recommendations first.  -Counseled on ergonomics and proper posture  -Follow-up 2 months  Today: notes improved stretching, hot tub, only occasionally occurring, folow clinically.    Leg pressure likely 2/2 mild venous insufficeincy  -Prev: Varicosities on legs and symptoms are consistent with possible mild venous insufficiency.-Discussed pathophysiology.    Today: tried compression stockings via lymphedema clinic, did not help and perhaps worsened.  As an aside, reports prior HERMINIA nl.  Overall daily sx but improved with stretching, exercise, massage, leg elevation.  Sx may have some overlap with neuropathy sx from prior back surgery, as states today that sx are going above knees.  Overall sx not significantly bothersome per patient, will follow clinically for now.    Calf muscle cramps, etiology unclear  -Continue stretching and hydration  -Potassium magnesium and calcium all normal, discussed with patient  -Patient has tried taking a multivitamin with B vitamins, and in fact her B6 vitamin level was previously supratherapeutic, she reports no change in Symptomatology with this, as such likely no utility in trialing B vitamin supplementation.  Discussed possibility of using diltiazem, patient politely declines at present, stating that the cramps are only lasting a few minutes when they occur and that overall they are tolerable.  Discussed that we could revisit this if worsens, and that quinidine is another option although has a very significant side effect profile and would not recommend unless cramps are debilitating.  -subsequent visit:Patient reports calf cramping is only intermittently occurring, is doing the stretches daily and feels these are potentially helping.  Not otherwise addressed.  -today:. Re[rpts improved with calf strecthing, hot tub.  Occasionally happenig now, not significantly bothering patient, follow clinnically      History of functional murmur  -Echo had only mild valvular abnormalities and  aortic sclerosis, otherwise normal.  Discussed with patient, continue diet and exercise, no further intervention needed.  Likely flow murmur.    Mild leukopenia, etiology unclear  -Patient previously denied fevers, chills, night sweats, well-appearing, weight is stable in EMR.  Was previously slightly elevated at 13.2, now 4.3.  since drawing labs as above, will repeat cbc with manual diff to trend wbc.  -repeta cbc nl, follow clinically.    Prediabtes, resolved  -Resolved on repeat A1c, praised patient, continue diet and exercise.    ASCVD discussion, began and partially discussed.  -LDL is 151, given that the patient is not diabetic, has no history of cardiovascular disease, is a non-smoker, and is not hypertensive, her elevated ASCVD would be mostly age-related at this point.  Discussed with patient that overall healthy lifestyle interventions would be the most effective and appropriate thing to reduce her risk of cardiovascular disease, patient in agreement with this plan.  At follow-up can complete this discussion but discussing the age component of the ASCVD elevation and her preference to treat this versus not with statin therapy.    Constipation, improved  -Not fully addressed today.  Patient reports daily bowel movements although they are somewhat hard, states that she is eating increased fiber and using MiraLAX as needed.  Offered to discuss further at future visit, patient states she is fine with monitoring for now.     Spinal stenosis - appreciate neuro support, encourage patient to follow-up with them regarding any future management of her gabapentin or symptoms.    Endocrine referral placed per patient request for injectible osteoporosis therapy to re-establish, apprecirae support.  Counseled that need for repeating DEXA scans on medical therapy for osteoporosis is not clear, probably would not benefit with repeat before 5 years, but will defer to endocrine given they are the primary team managing her  Forteo injections.  Also noted that pth ordered by her previous endocrinologist was low, but ca and vit d nl.  Defer to endocrine for further mgmt.    Counseled patient that if she is eating a well totally well-balanced/healthy diet that she does not need to supplement with vitamins, and that they are not FDA approved so could contain contaminants.    Return to clinic 1 month to f/u anxiety, likely tension headaches, and htn. Then annual exam.

## 2024-01-08 ENCOUNTER — TELEPHONE (OUTPATIENT)
Dept: HEALTH INFORMATION MANAGEMENT | Facility: OTHER | Age: 84
End: 2024-01-08
Payer: COMMERCIAL

## 2024-01-09 ENCOUNTER — HOSPITAL ENCOUNTER (OUTPATIENT)
Dept: LAB | Facility: MEDICAL CENTER | Age: 84
End: 2024-01-09
Attending: INTERNAL MEDICINE
Payer: COMMERCIAL

## 2024-01-09 DIAGNOSIS — Z13.220 SCREENING CHOLESTEROL LEVEL: ICD-10-CM

## 2024-01-09 DIAGNOSIS — E87.1 HYPONATREMIA: ICD-10-CM

## 2024-01-09 DIAGNOSIS — R00.0 TACHYCARDIA: ICD-10-CM

## 2024-01-09 LAB
ANION GAP SERPL CALC-SCNC: 11 MMOL/L (ref 7–16)
BUN SERPL-MCNC: 17 MG/DL (ref 8–22)
CALCIUM SERPL-MCNC: 9.3 MG/DL (ref 8.4–10.2)
CHLORIDE SERPL-SCNC: 97 MMOL/L (ref 96–112)
CHOLEST SERPL-MCNC: 265 MG/DL (ref 100–199)
CO2 SERPL-SCNC: 27 MMOL/L (ref 20–33)
CREAT SERPL-MCNC: 0.6 MG/DL (ref 0.5–1.4)
FASTING STATUS PATIENT QL REPORTED: NORMAL
GFR SERPLBLD CREATININE-BSD FMLA CKD-EPI: 88 ML/MIN/1.73 M 2
GLUCOSE SERPL-MCNC: 96 MG/DL (ref 65–99)
HDLC SERPL-MCNC: 69 MG/DL
LDLC SERPL CALC-MCNC: 177 MG/DL
POTASSIUM SERPL-SCNC: 4.1 MMOL/L (ref 3.6–5.5)
SODIUM SERPL-SCNC: 135 MMOL/L (ref 135–145)
T4 FREE SERPL-MCNC: 1.29 NG/DL (ref 0.93–1.7)
TRIGL SERPL-MCNC: 93 MG/DL (ref 0–149)
TSH SERPL DL<=0.005 MIU/L-ACNC: 6.88 UIU/ML (ref 0.38–5.33)

## 2024-01-09 PROCEDURE — 84439 ASSAY OF FREE THYROXINE: CPT

## 2024-01-09 PROCEDURE — 84443 ASSAY THYROID STIM HORMONE: CPT

## 2024-01-09 PROCEDURE — 80048 BASIC METABOLIC PNL TOTAL CA: CPT

## 2024-01-09 PROCEDURE — 36415 COLL VENOUS BLD VENIPUNCTURE: CPT

## 2024-01-09 PROCEDURE — 80061 LIPID PANEL: CPT

## 2024-01-11 ENCOUNTER — OFFICE VISIT (OUTPATIENT)
Dept: INTERNAL MEDICINE | Facility: OTHER | Age: 84
End: 2024-01-11
Payer: COMMERCIAL

## 2024-01-11 VITALS
SYSTOLIC BLOOD PRESSURE: 139 MMHG | OXYGEN SATURATION: 96 % | DIASTOLIC BLOOD PRESSURE: 80 MMHG | TEMPERATURE: 97.7 F | BODY MASS INDEX: 19.79 KG/M2 | WEIGHT: 104.8 LBS | HEIGHT: 61 IN | HEART RATE: 76 BPM

## 2024-01-11 DIAGNOSIS — E03.8 SUBCLINICAL HYPOTHYROIDISM: ICD-10-CM

## 2024-01-11 DIAGNOSIS — Z71.89 CARDIAC RISK COUNSELING: ICD-10-CM

## 2024-01-11 DIAGNOSIS — Z71.89 COUNSELING AND COORDINATION OF CARE: ICD-10-CM

## 2024-01-11 DIAGNOSIS — I10 HYPERTENSION, UNSPECIFIED TYPE: ICD-10-CM

## 2024-01-11 PROCEDURE — 3079F DIAST BP 80-89 MM HG: CPT | Performed by: INTERNAL MEDICINE

## 2024-01-11 PROCEDURE — 1126F AMNT PAIN NOTED NONE PRSNT: CPT | Performed by: INTERNAL MEDICINE

## 2024-01-11 PROCEDURE — 3075F SYST BP GE 130 - 139MM HG: CPT | Performed by: INTERNAL MEDICINE

## 2024-01-11 PROCEDURE — 99214 OFFICE O/P EST MOD 30 MIN: CPT | Performed by: INTERNAL MEDICINE

## 2024-01-11 RX ORDER — ROSUVASTATIN CALCIUM 10 MG/1
10 TABLET, COATED ORAL EVERY EVENING
Qty: 90 TABLET | Refills: 0 | Status: SHIPPED | OUTPATIENT
Start: 2024-01-11

## 2024-01-11 RX ORDER — AMLODIPINE BESYLATE 10 MG/1
10 TABLET ORAL DAILY
Qty: 60 TABLET | Refills: 0 | Status: SHIPPED | OUTPATIENT
Start: 2024-01-11

## 2024-01-11 ASSESSMENT — PATIENT HEALTH QUESTIONNAIRE - PHQ9: CLINICAL INTERPRETATION OF PHQ2 SCORE: 0

## 2024-01-11 ASSESSMENT — FIBROSIS 4 INDEX: FIB4 SCORE: 1.59

## 2024-01-11 ASSESSMENT — PAIN SCALES - GENERAL: PAINLEVEL: NO PAIN

## 2024-01-11 NOTE — PROGRESS NOTES
Subjective     Antonio Treviño is a 81 y.o. female who presents with Hypertension (Patient here for htn)            HPI   83 year old woman with spinal stenosis s/p laminectomy complicated by periperal neuropathy, also with compression fracture - followed by neuro, osteoporosis on forteo followed by endo, ramon, hx bppv s/p Epley with resolution, improved dry eye in setting of prior eye surgery, L wrist sprain s/p surgery and subsequent fracture, functional murmur (echo confirmed) here for follow-up of hypertension, mild TSH elevation, elevated LDL cholesterol.        Please see a/p.    ROS           Objective     VS bp 139/80.  Home bps 120s-130s sbp in am, 140s-160 sbp in pms    Physical Exam  General: Pleasant woman in no apparent distress, alert, responding appropriately.   CV: Grossly regular rate and rhythm no murmurs rubs or gallops at auscultation only at fourth intercostal space midclavicular space  Pulm: grossly ctab   Ext: no le edema at bases b/l  Neuro: smile symmetric, no pronator drift, speech nl  Heent: no thyromegaly                Assessment & Plan     HTN  -No lightheadedness, chest pain, shortness of breath, motor weakness, speech issues, facial droop.  -benign cv (known tyrel present) and stroke screens  -bp continues at goal in ams on home bp but in late afternoons is above goal with sbp 140s-160s.    Plan  -incr to 10 amlodipine  -cont diet/exercise/low salt  -f/u 4 weeks, log bp 3 day max.  Call if lightheaded/reduce amlodipine to 5mg qday  -previously cautioned any chest pain or shortness of breath > 5 minutes, any significant motor weakness, speech issues, or facial droop, cautioned patient to report to the emergency department immediately.    Elevated ldl and ASCVD  -recently 177., prior 151.  Prev discussed, patient was not interested in statin at prior annual exam.  Given the LDL is now above 160 and patient has developed the additional risk factor of evening hypertension, and patient's  ASCVD is elevated (above 30%, although a significant portion of this is age-related), revisited the possibility of statin initiation.  Patient is amenable.  Recent LFTs normal.  Patient reports was previously on atorvastatin but was stopped due to possible myalgias, will begin rosuvastatin 10 mg once daily, follow-up cholesterol levels in approximately 3 months.  Continue with hypertension treatment and diet/exercise interventions as previously discussed.    Likely subclinical hypothyroidism vs mild tsh fluctauation due to age, likely no current clinical significacne  -No clear symptoms referable to TSH, denies hot or cold intolerance, palpitations, skin or hair changes, does have chronic intermittent constipation that precedes TSH change and was present when patient had a normal TSH.  No thyromegaly.  -given age >70, TSH < 6.9, no clear symptoms, prior tsh nl, suggested we not treat at present and instead repeat TSH in 2-6 months, patient in agreement.  This is consistent with up-to-date recommendations.    Ca counseling in the setting of calcium channel blocker  -Patient inquiring whether calcium supplementation could be harmful or her bone health could be affected by calcium channel blocker/amlodipine.  Discussed I am unaware of this being an issue, review of up-to-date adverse reactions does not list osteopenia or porosis as an issue, although osteoarthritis is listed as a very rare possible side effect.  Discussed with patient.  Also reviewed the recommendation of 1200 mg total calcium including diet and supplementation, suggested patient specifically quantitate her dietary intake as she reports eating dairy.    Persistent dry cough requiring further evaluation  -Slightly improved after discontinuing lisinopril but still occuring, patient has no smoking history, no shortness of breath, and lungs have serially been without wheezing, making asthma or COPD less likely.  Prevoiusly patient denied post nasal drip  "symptoms.  Discussed with patient most likely etiologies are postnasal drip or heartburn, offered to make visit sooner than 1 month to address if bothering patient, patient prefers to follow-up in 1 month    Mild hyponatremia during recent ED visit for HA  -resolved, follow clinicaly.  Discussed lab results with patient.    Patient reports now trialing meditation, anxiety was not otherwise discussed today.    From Prior Visit, not addressed:    Anxiety with panic attacks  -no SI/HI/AH/VH.  Patient reports \"all her life\" she has been anxious.  She notes worrying about many things in general, although denies worrying about \"the little things\".  She states that she will begin to ruminate on things and then at times this will trigger periods where she has a fast heart rate, develops a headache, and feels significantly anxious for a period of 1 to 2 hours, where this then subsides.  Denies obsessions or rituals thus OCD unlikely, denies embarrassment around socialization thus social anxiety disorder unlikely, denies concern about \"escaping to safety\" thus agoraphobia unlikely, do need to clarify whether there is an inciting traumatic event.  However, symptoms seem diffuse.  PHQ 2 negative making depression unlikely.  Symptoms are likely consistent with panic attacks with possible generalized anxiety disorder versus elevated anxiety      Plan  -continue diet/exercise  -discussed pathophysiology of anxiety/panic attacks generally, rec cbt resources.  Anxiety resournces hangout given.  -Safety contract reviewed  -Follow-up next visit on CBT resources and also jarred screen  -Patient now trialing [facility] mindful/meditation (reported previous visit, not otherwise addressed)    Healthcare Maintenance (from last visit, not yet covered)    Suggest fit testing if wishes to continue, depression/anxiety/caregiver/ a1c screening/constipation.    Colonoscopy: 2017, GI following and she diann lschedule w/I next year, denies stool " changes  HIV: neg 2022  Syphilis: neg 2022  Hep C: neg 2022  A1c: fbs previously 100, a1c 5.6, pt acctively working on diet/exercise.  Check a1c for monioring  ASCVD: prev discussed, pt prefers diet/exercise focus which is reasonable as her elevated ascvd largely age related, also discus coronary artery scoring/ct, pt prefers to observe with diet/exercise at present.  Denies cp/sob with exertion.  Smoking: never  Alcohol Use: never  Recreational Drugs: never  Healthy Eating/Exercise: discussed veg inclined diet evidence based and exericse, patient is very physically active doing 1 hour walks, aquatherapy/exercise, home PT exercises, praised patients healthy efforts  Depression and Anxiety: neg phq2.  States some anxiety about age and future and what will happen to her  (who has dementia) if she was to pass away. States feeling ok overall though, declcines making visit to discuss further.  Offered to help support the patient at any time such as with coordinating support for care for her .  Immunizations: utd covid, pna, tdap, shingrix, flu shot    Mammogram: nl 2022, repeat 2024 if wishes.  PAP: s/p no non-cancer, prior pap nl, no further monitoring needed  DEXA: osteoporosis on tx per endo, apprecite support  Goals of Care: has advanced directive              Neck pressure likely 2/2 paraspinal/trapezium muscle spasms and poor posture/biomechanics  -Prev: No alarm symptoms.  Significant neck limitation of motion in addition to tight trapezius musculature along with poor biomechanics while reading and baseline hunched posture are likely contributing to neck spasms which are for what ever reason exacerbated when she is standing and walking.    Plan  -Prev: Home physical therapy for neck mobilization, handout given, also demonstrated some exercises for patient and recommended massage, also yoga.  Offered formal PT, patient prefers to try home recommendations first.  -Counseled on ergonomics and proper  posture  -Follow-up 2 months  Today: notes improved stretching, hot tub, only occasionally occurring, folow clinically.    Leg pressure likely 2/2 mild venous insufficeincy  -Prev: Varicosities on legs and symptoms are consistent with possible mild venous insufficiency.-Discussed pathophysiology.    Today: tried compression stockings via lymphedema clinic, did not help and perhaps worsened.  As an aside, reports prior HERMINIA nl.  Overall daily sx but improved with stretching, exercise, massage, leg elevation.  Sx may have some overlap with neuropathy sx from prior back surgery, as states today that sx are going above knees.  Overall sx not significantly bothersome per patient, will follow clinically for now.    Calf muscle cramps, etiology unclear  -Continue stretching and hydration  -Potassium magnesium and calcium all normal, discussed with patient  -Patient has tried taking a multivitamin with B vitamins, and in fact her B6 vitamin level was previously supratherapeutic, she reports no change in Symptomatology with this, as such likely no utility in trialing B vitamin supplementation.  Discussed possibility of using diltiazem, patient politely declines at present, stating that the cramps are only lasting a few minutes when they occur and that overall they are tolerable.  Discussed that we could revisit this if worsens, and that quinidine is another option although has a very significant side effect profile and would not recommend unless cramps are debilitating.  -subsequent visit:Patient reports calf cramping is only intermittently occurring, is doing the stretches daily and feels these are potentially helping.  Not otherwise addressed.  -today:. Re[rpts improved with calf strecthing, hot tub.  Occasionally happenig now, not significantly bothering patient, follow clinnically      History of functional murmur  -Echo had only mild valvular abnormalities and aortic sclerosis, otherwise normal.  Discussed with patient,  continue diet and exercise, no further intervention needed.  Likely flow murmur.    Mild leukopenia, etiology unclear  -Patient previously denied fevers, chills, night sweats, well-appearing, weight is stable in EMR.  Was previously slightly elevated at 13.2, now 4.3.  since drawing labs as above, will repeat cbc with manual diff to trend wbc.  -repeta cbc nl, follow clinically.    Prediabtes, resolved  -Resolved on repeat A1c, praised patient, continue diet and exercise.    ASCVD discussion, began and partially discussed.  -LDL is 151, given that the patient is not diabetic, has no history of cardiovascular disease, is a non-smoker, and is not hypertensive, her elevated ASCVD would be mostly age-related at this point.  Discussed with patient that overall healthy lifestyle interventions would be the most effective and appropriate thing to reduce her risk of cardiovascular disease, patient in agreement with this plan.  At follow-up can complete this discussion but discussing the age component of the ASCVD elevation and her preference to treat this versus not with statin therapy.    Constipation, improved  -Not fully addressed today.  Patient reports daily bowel movements although they are somewhat hard, states that she is eating increased fiber and using MiraLAX as needed.  Offered to discuss further at future visit, patient states she is fine with monitoring for now.     Spinal stenosis - appreciate neuro support, encourage patient to follow-up with them regarding any future management of her gabapentin or symptoms.    Endocrine referral placed per patient request for injectible osteoporosis therapy to re-establish, apprecirae support.  Counseled that need for repeating DEXA scans on medical therapy for osteoporosis is not clear, probably would not benefit with repeat before 5 years, but will defer to endocrine given they are the primary team managing her Forteo injections.  Also noted that pth ordered by her  previous endocrinologist was low, but ca and vit d nl.  Defer to endocrine for further mgmt.    Counseled patient that if she is eating a well totally well-balanced/healthy diet that she does not need to supplement with vitamins, and that they are not FDA approved so could contain contaminants.    Return to clinic 4 weeks to f/u on hypertension and statin initiation, possibly chronic cough.  Then anxiety. Repeat ldl and tsh due 4/2024. Then annual exam.  Offered to see patient sooner than 4 weeks, patient prefers 4 weeks.

## 2024-02-15 ENCOUNTER — OFFICE VISIT (OUTPATIENT)
Dept: INTERNAL MEDICINE | Facility: OTHER | Age: 84
End: 2024-02-15
Payer: COMMERCIAL

## 2024-02-15 VITALS
DIASTOLIC BLOOD PRESSURE: 60 MMHG | BODY MASS INDEX: 20.47 KG/M2 | WEIGHT: 108.4 LBS | HEIGHT: 61 IN | TEMPERATURE: 97.2 F | OXYGEN SATURATION: 96 % | SYSTOLIC BLOOD PRESSURE: 99 MMHG | HEART RATE: 72 BPM

## 2024-02-15 DIAGNOSIS — I10 HYPERTENSION, UNSPECIFIED TYPE: ICD-10-CM

## 2024-02-15 DIAGNOSIS — E78.5 DYSLIPIDEMIA: ICD-10-CM

## 2024-02-15 DIAGNOSIS — E03.8 SUBCLINICAL HYPOTHYROIDISM: ICD-10-CM

## 2024-02-15 DIAGNOSIS — R05.3 CHRONIC COUGH: ICD-10-CM

## 2024-02-15 DIAGNOSIS — R79.89 ELEVATED TSH: ICD-10-CM

## 2024-02-15 PROCEDURE — 3078F DIAST BP <80 MM HG: CPT | Performed by: INTERNAL MEDICINE

## 2024-02-15 PROCEDURE — 99214 OFFICE O/P EST MOD 30 MIN: CPT | Mod: GC | Performed by: INTERNAL MEDICINE

## 2024-02-15 PROCEDURE — 3074F SYST BP LT 130 MM HG: CPT | Performed by: INTERNAL MEDICINE

## 2024-02-15 PROCEDURE — 1125F AMNT PAIN NOTED PAIN PRSNT: CPT | Performed by: INTERNAL MEDICINE

## 2024-02-15 RX ORDER — LOSARTAN POTASSIUM 25 MG/1
25 TABLET ORAL DAILY
Qty: 30 TABLET | Refills: 1 | Status: SHIPPED | OUTPATIENT
Start: 2024-02-15

## 2024-02-15 ASSESSMENT — PAIN SCALES - GENERAL: PAINLEVEL: 4=SLIGHT-MODERATE PAIN

## 2024-02-15 ASSESSMENT — FIBROSIS 4 INDEX: FIB4 SCORE: 1.59

## 2024-02-15 NOTE — PROGRESS NOTES
Subjective     Antonio Treviño is a 81 y.o. female who presents with No chief complaint on file.            HPI   83 year old woman with spinal stenosis s/p laminectomy complicated by periperal neuropathy, also with compression fracture - followed by neuro, osteoporosis on forteo followed by endo, ramon, hx bppv s/p Epley with resolution, improved dry eye in setting of prior eye surgery, L wrist sprain s/p surgery and subsequent fracture, functional murmur (echo confirmed) here for follow-up of hypertension, chronic cough, mild TSH elevation, elevated LDL cholesterol.        Please see a/p.    ROS           Objective     Here low normal bp, otherwise vss    Physical Exam  General: Pleasant woman in no apparent distress, alert, responding appropriately.   CV: Grossly regular rate and rhythm no murmurs rubs or gallops at auscultation only 2ics lsb  Pulm: grossly ctab, faint crackles at R base  Ext: no le edema b/l  Neuro: smile symmetric, no pronator drift, speech nl  Heent: grossly clear op, no clear c/c lad                Assessment & Plan     HTN  -No lightheadedness, chest pain, shortness of breath, motor weakness, speech issues, facial droop.  -benign cv (known tyrel present) and stroke screens  -patient brings extensive home bp log, takes tid.  bp continues at goal in ams on home bp but in late afternoons is above goal with sbp 140s-160s. Was Lightheaded on almodlipine 10 with le edema, self downtitrated to 5mg dialy, was not lightheaded but still had le edema so self d/c'd.  No le edema toay..  130s sbp pms on 10 of amlodipine, 140s on 5 of amlodipine, 150s-160s off med    Plan  -start losartan 25 (tried lisonopril previously but had a cough.  We elected to trial losartan and instead of a diuretic as it would not impact frequency of urination and has potential cardiac and renal protective benefits should she needed in the future).  BMP in 1 week  -cont diet/exercise/low salt diet  -f/u 2-4 weeks, continue to  log bp 3 day max.    -previously cautioned any chest pain or shortness of breath > 5 minutes, any significant motor weakness, speech issues, or facial droop, cautioned patient to report to the emergency department immediately.    Chronic dry cough, significantly improved but still present, possibly secondary to postnasal drip  -Began after starting lisinopril, improved after discontinuing lisinopril but still occuring, patient has no smoking history, no shortness of breath, and lungs have serially been without wheezing, making asthma or COPD less likely.  Prevoiusly and currently patient denied post nasal drip symptoms.  Did mention history of GERD but no current symptoms of heartburn.    Currently cough occurs 2-3 times per day, not bothering pt.      Plan:   -Discussed patient could trial netti pot 1-2x daily if she would like as this is a very low risk intervention, etiology is not completely clear.  Patient states symptoms are not really bothering her, also reasonable to follow clinically    TSH and lipids ordered to prep for next visit, not otherwise addressed    From Prior Visit, not addressed:    Elevated ldl and ASCVD  -recently 177., prior 151.  Prev discussed, patient was not interested in statin at prior annual exam.  Given the LDL is now above 160 and patient has developed the additional risk factor of evening hypertension, and patient's ASCVD is elevated (above 30%, although a significant portion of this is age-related), revisited the possibility of statin initiation.  Patient is amenable.  Recent LFTs normal.  Patient reports was previously on atorvastatin but was stopped due to possible myalgias, will begin rosuvastatin 10 mg once daily, follow-up cholesterol levels in approximately 3 months.  Continue with hypertension treatment and diet/exercise interventions as previously discussed.      Likely subclinical hypothyroidism vs mild tsh fluctauation due to age, likely no current clinical  "significacne  -No clear symptoms referable to TSH, denies hot or cold intolerance, palpitations, skin or hair changes, does have chronic intermittent constipation that precedes TSH change and was present when patient had a normal TSH.  No thyromegaly.  -given age >70, TSH < 6.9, no clear symptoms, prior tsh nl, suggested we not treat at present and instead repeat TSH in 2-6 months, patient in agreement.  This is consistent with up-to-date recommendations.                Ca counseling in the setting of calcium channel blocker  -Patient inquiring whether calcium supplementation could be harmful or her bone health could be affected by calcium channel blocker/amlodipine.  Discussed I am unaware of this being an issue, review of up-to-date adverse reactions does not list osteopenia or porosis as an issue, although osteoarthritis is listed as a very rare possible side effect.  Discussed with patient.  Also reviewed the recommendation of 1200 mg total calcium including diet and supplementation, suggested patient specifically quantitate her dietary intake as she reports eating dairy.      Mild hyponatremia during recent ED visit for HA  -resolved, follow clinicaly.  Discussed lab results with patient.    Patient reports now trialing meditation, anxiety was not otherwise discussed today.      Anxiety with panic attacks  -no SI/HI/AH/VH.  Patient reports \"all her life\" she has been anxious.  She notes worrying about many things in general, although denies worrying about \"the little things\".  She states that she will begin to ruminate on things and then at times this will trigger periods where she has a fast heart rate, develops a headache, and feels significantly anxious for a period of 1 to 2 hours, where this then subsides.  Denies obsessions or rituals thus OCD unlikely, denies embarrassment around socialization thus social anxiety disorder unlikely, denies concern about \"escaping to safety\" thus agoraphobia unlikely, do " need to clarify whether there is an inciting traumatic event.  However, symptoms seem diffuse.  PHQ 2 negative making depression unlikely.  Symptoms are likely consistent with panic attacks with possible generalized anxiety disorder versus elevated anxiety      Plan  -continue diet/exercise  -discussed pathophysiology of anxiety/panic attacks generally, rec cbt resources.  Anxiety resournces hangout given.  -Safety contract reviewed  -Follow-up next visit on CBT resources and also jarred screen  -Patient now trialing [facility] mindful/meditation (reported previous visit, not otherwise addressed)    Healthcare Maintenance (from last visit, not yet covered)    Suggest fit testing if wishes to continue, depression/anxiety/caregiver/ a1c screening/constipation.    Colonoscopy: 2017, GI following and she diann lschedule w/I next year, denies stool changes  HIV: neg 2022  Syphilis: neg 2022  Hep C: neg 2022  A1c: fbs previously 100, a1c 5.6, pt acctively working on diet/exercise.  Check a1c for monioring  ASCVD: prev discussed, pt prefers diet/exercise focus which is reasonable as her elevated ascvd largely age related, also discus coronary artery scoring/ct, pt prefers to observe with diet/exercise at present.  Denies cp/sob with exertion.  Smoking: never  Alcohol Use: never  Recreational Drugs: never  Healthy Eating/Exercise: discussed veg inclined diet evidence based and exericse, patient is very physically active doing 1 hour walks, aquatherapy/exercise, home PT exercises, praised patients healthy efforts  Depression and Anxiety: neg phq2.  States some anxiety about age and future and what will happen to her  (who has dementia) if she was to pass away. States feeling ok overall though, declcines making visit to discuss further.  Offered to help support the patient at any time such as with coordinating support for care for her .  Immunizations: utd covid, pna, tdap, shingrix, flu shot    Mammogram: nl 2022,  repeat 2024 if wishes.  PAP: s/p no non-cancer, prior pap nl, no further monitoring needed  DEXA: osteoporosis on tx per endo, apprecite support  Goals of Care: has advanced directive              Neck pressure likely 2/2 paraspinal/trapezium muscle spasms and poor posture/biomechanics  -Prev: No alarm symptoms.  Significant neck limitation of motion in addition to tight trapezius musculature along with poor biomechanics while reading and baseline hunched posture are likely contributing to neck spasms which are for what ever reason exacerbated when she is standing and walking.    Plan  -Prev: Home physical therapy for neck mobilization, handout given, also demonstrated some exercises for patient and recommended massage, also yoga.  Offered formal PT, patient prefers to try home recommendations first.  -Counseled on ergonomics and proper posture  -Follow-up 2 months  Today: notes improved stretching, hot tub, only occasionally occurring, folow clinically.    Leg pressure likely 2/2 mild venous insufficeincy  -Prev: Varicosities on legs and symptoms are consistent with possible mild venous insufficiency.-Discussed pathophysiology.    Today: tried compression stockings via lymphedema clinic, did not help and perhaps worsened.  As an aside, reports prior HERMINIA nl.  Overall daily sx but improved with stretching, exercise, massage, leg elevation.  Sx may have some overlap with neuropathy sx from prior back surgery, as states today that sx are going above knees.  Overall sx not significantly bothersome per patient, will follow clinically for now.    Calf muscle cramps, etiology unclear  -Continue stretching and hydration  -Potassium magnesium and calcium all normal, discussed with patient  -Patient has tried taking a multivitamin with B vitamins, and in fact her B6 vitamin level was previously supratherapeutic, she reports no change in Symptomatology with this, as such likely no utility in trialing B vitamin  supplementation.  Discussed possibility of using diltiazem, patient politely declines at present, stating that the cramps are only lasting a few minutes when they occur and that overall they are tolerable.  Discussed that we could revisit this if worsens, and that quinidine is another option although has a very significant side effect profile and would not recommend unless cramps are debilitating.  -subsequent visit:Patient reports calf cramping is only intermittently occurring, is doing the stretches daily and feels these are potentially helping.  Not otherwise addressed.  -today:. Re[rpts improved with calf strecthing, hot tub.  Occasionally happenig now, not significantly bothering patient, follow clinnically      History of functional murmur  -Echo had only mild valvular abnormalities and aortic sclerosis, otherwise normal.  Discussed with patient, continue diet and exercise, no further intervention needed.  Likely flow murmur.    Mild leukopenia, etiology unclear  -Patient previously denied fevers, chills, night sweats, well-appearing, weight is stable in EMR.  Was previously slightly elevated at 13.2, now 4.3.  since drawing labs as above, will repeat cbc with manual diff to trend wbc.  -repeta cbc nl, follow clinically.    Prediabtes, resolved  -Resolved on repeat A1c, praised patient, continue diet and exercise.    ASCVD discussion, began and partially discussed.  -LDL is 151, given that the patient is not diabetic, has no history of cardiovascular disease, is a non-smoker, and is not hypertensive, her elevated ASCVD would be mostly age-related at this point.  Discussed with patient that overall healthy lifestyle interventions would be the most effective and appropriate thing to reduce her risk of cardiovascular disease, patient in agreement with this plan.  At follow-up can complete this discussion but discussing the age component of the ASCVD elevation and her preference to treat this versus not with  statin therapy.    Constipation, improved  -Not fully addressed today.  Patient reports daily bowel movements although they are somewhat hard, states that she is eating increased fiber and using MiraLAX as needed.  Offered to discuss further at future visit, patient states she is fine with monitoring for now.     Spinal stenosis - appreciate neuro support, encourage patient to follow-up with them regarding any future management of her gabapentin or symptoms.    Endocrine referral placed per patient request for injectible osteoporosis therapy to re-establish, apprecirae support.  Counseled that need for repeating DEXA scans on medical therapy for osteoporosis is not clear, probably would not benefit with repeat before 5 years, but will defer to endocrine given they are the primary team managing her Forteo injections.  Also noted that pth ordered by her previous endocrinologist was low, but ca and vit d nl.  Defer to endocrine for further mgmt.    Counseled patient that if she is eating a well totally well-balanced/healthy diet that she does not need to supplement with vitamins, and that they are not FDA approved so could contain contaminants.    Return to clinic 2-4 weeks to f/u on hypertension,, subclinical hypothyroidism cholesterol level.  Then anxiety and mild chronic cough. Then annual exam.

## 2024-04-02 RX ORDER — ROSUVASTATIN CALCIUM 10 MG/1
10 TABLET, COATED ORAL EVERY EVENING
Qty: 90 TABLET | Refills: 1 | Status: SHIPPED | OUTPATIENT
Start: 2024-04-02

## 2024-04-02 NOTE — TELEPHONE ENCOUNTER
Received request via: Pharmacy    Was the patient seen in the last year in this department? Yes    Does the patient have an active prescription (recently filled or refills available) for medication(s) requested?  yes    [facility]    Does the patient have group home Plus and need 100 day supply (blood pressure, diabetes and cholesterol meds only)? Patient does not have SCP

## 2024-04-04 ENCOUNTER — HOSPITAL ENCOUNTER (OUTPATIENT)
Dept: LAB | Facility: MEDICAL CENTER | Age: 84
End: 2024-04-04
Attending: INTERNAL MEDICINE
Payer: COMMERCIAL

## 2024-04-04 DIAGNOSIS — I10 HYPERTENSION, UNSPECIFIED TYPE: ICD-10-CM

## 2024-04-04 DIAGNOSIS — E78.5 DYSLIPIDEMIA: ICD-10-CM

## 2024-04-04 DIAGNOSIS — E03.8 SUBCLINICAL HYPOTHYROIDISM: ICD-10-CM

## 2024-04-04 LAB
ANION GAP SERPL CALC-SCNC: 11 MMOL/L (ref 7–16)
BUN SERPL-MCNC: 17 MG/DL (ref 8–22)
CALCIUM SERPL-MCNC: 9.4 MG/DL (ref 8.4–10.2)
CHLORIDE SERPL-SCNC: 102 MMOL/L (ref 96–112)
CHOLEST SERPL-MCNC: 172 MG/DL (ref 100–199)
CO2 SERPL-SCNC: 26 MMOL/L (ref 20–33)
CREAT SERPL-MCNC: 0.72 MG/DL (ref 0.5–1.4)
FASTING STATUS PATIENT QL REPORTED: NORMAL
GFR SERPLBLD CREATININE-BSD FMLA CKD-EPI: 82 ML/MIN/1.73 M 2
GLUCOSE SERPL-MCNC: 102 MG/DL (ref 65–99)
HDLC SERPL-MCNC: 60 MG/DL
LDLC SERPL CALC-MCNC: 97 MG/DL
POTASSIUM SERPL-SCNC: 4.4 MMOL/L (ref 3.6–5.5)
SODIUM SERPL-SCNC: 139 MMOL/L (ref 135–145)
TRIGL SERPL-MCNC: 77 MG/DL (ref 0–149)
TSH SERPL DL<=0.005 MIU/L-ACNC: 4.36 UIU/ML (ref 0.38–5.33)

## 2024-04-04 PROCEDURE — 80061 LIPID PANEL: CPT

## 2024-04-04 PROCEDURE — 36415 COLL VENOUS BLD VENIPUNCTURE: CPT

## 2024-04-04 PROCEDURE — 84443 ASSAY THYROID STIM HORMONE: CPT

## 2024-04-04 PROCEDURE — 80048 BASIC METABOLIC PNL TOTAL CA: CPT

## 2024-04-11 ENCOUNTER — APPOINTMENT (OUTPATIENT)
Dept: RADIOLOGY | Facility: MEDICAL CENTER | Age: 84
End: 2024-04-11
Attending: INTERNAL MEDICINE
Payer: COMMERCIAL

## 2024-04-11 ENCOUNTER — APPOINTMENT (OUTPATIENT)
Dept: INTERNAL MEDICINE | Facility: OTHER | Age: 84
End: 2024-04-11
Payer: COMMERCIAL

## 2024-04-11 VITALS
BODY MASS INDEX: 18.13 KG/M2 | TEMPERATURE: 96.8 F | WEIGHT: 108.8 LBS | DIASTOLIC BLOOD PRESSURE: 67 MMHG | HEART RATE: 82 BPM | HEIGHT: 65 IN | SYSTOLIC BLOOD PRESSURE: 113 MMHG | OXYGEN SATURATION: 97 %

## 2024-04-11 DIAGNOSIS — M54.12 CERVICAL RADICULOPATHY: ICD-10-CM

## 2024-04-11 DIAGNOSIS — I10 HYPERTENSION, UNSPECIFIED TYPE: ICD-10-CM

## 2024-04-11 DIAGNOSIS — E78.5 HYPERLIPIDEMIA, UNSPECIFIED HYPERLIPIDEMIA TYPE: ICD-10-CM

## 2024-04-11 DIAGNOSIS — M54.2 NECK PAIN: ICD-10-CM

## 2024-04-11 DIAGNOSIS — Z71.89 COUNSELING AND COORDINATION OF CARE: ICD-10-CM

## 2024-04-11 PROCEDURE — 1125F AMNT PAIN NOTED PAIN PRSNT: CPT | Performed by: INTERNAL MEDICINE

## 2024-04-11 PROCEDURE — 3078F DIAST BP <80 MM HG: CPT | Performed by: INTERNAL MEDICINE

## 2024-04-11 PROCEDURE — 3074F SYST BP LT 130 MM HG: CPT | Performed by: INTERNAL MEDICINE

## 2024-04-11 PROCEDURE — 99214 OFFICE O/P EST MOD 30 MIN: CPT | Performed by: INTERNAL MEDICINE

## 2024-04-11 PROCEDURE — 72050 X-RAY EXAM NECK SPINE 4/5VWS: CPT

## 2024-04-11 ASSESSMENT — FIBROSIS 4 INDEX: FIB4 SCORE: 1.59

## 2024-04-11 ASSESSMENT — PAIN SCALES - GENERAL: PAINLEVEL: 4=SLIGHT-MODERATE PAIN

## 2024-04-11 NOTE — PROGRESS NOTES
"Subjective     Antonio Treviño is a 81 y.o. female who presents with Hypertension Follow-up, Anxiety, Panic Attack, Follow-Up, Neck Pain, and Shoulder Pain            HPI   83 year old woman with spinal stenosis s/p laminectomy complicated by periperal neuropathy, also with compression fracture - followed by neuro, osteoporosis on forteo followed by endo, vitelligo, hx bppv s/p Epley with resolution, improved dry eye in setting of prior eye surgery, L wrist sprain s/p surgery and subsequent fracture, functional murmur (echo confirmed) here for follow-up of hypertension,  mild TSH elevation, elevated LDL cholesterol, and neck pain.    She reports neck \"pressure\" that has worsened in the setting of her prior complaint.  It radiates up to her head, she also has times where she has symptoms that radiate down her right arm all the way to her fingertips intermittently.  No trauma.  No alarm symptoms.    Patient reports intermittent mild cramping of her arms and legs on rosuvastatin, states occurs daily for 1 or 2 minutes when she stretches but readily resolves with massage.    Patient brings home blood pressure log for review.        ROS           Objective     VSS    Physical Exam  General: Pleasant woman in no apparent distress, alert, responding appropriately.   MSK: no midline C-spine tenderness.  Significant limitation in active range of motion in rotation (to only approximately 45 degrees bilaterally) and sidebending (to only approximately 20 degrees bilaterally).  Strength 5 out of 5 and gross  and elbow flexion extension, negative Karly's bilaterally, sensation grossly intact to light touch on the dorsum of the hands bilaterally.                Assessment & Plan     Neck pressure likely 2/2 paraspinal/trapezius muscle spasms and poor posture/biomechanics, now worsening with intermittent radiculopathy  -No alarm symptoms.  Significant neck limitation of motion in addition to tight trapezius musculature along " with poor biomechanics while reading and baseline hunched posture are likely contributing to neck spasms which are for what ever reason exacerbated when she is standing and walking.  Notes resolution when she lies on her back.  Suspect poor posture/biomechanics along with potentially anxiety/stress are contributing to tight muscles.  Tried Home physical therapy for neck mobilization and hot tub which initially improved her symptoms, although have worsened.  Previously offered formal PT.    Plan  -Reviewed recommendations on ergonomics and proper posture, including pillow positioning  -Patient inquiring about imaging, reviewed the pitfalls of imaging interpretation for her symptoms, however as symptoms have been intermittently persistent for several months we will proceed with x-ray C-spine.  If negative will proceed with formal physical therapy for neck muscle mobilization.    -alarm precautions reviewed  -Follow-up in 2 months.    Addendum: xr neck normal.  Will proceed with PT  spoke with patient.    HTN  -not lightheaded.  bp at goal today.  patient brings home BP log, all readings are at goal <130/80 with the exception of evening readings, however patient realized that she was always taking her evening readings immediately after watching movies which made her excited, if she waited 20 minutes and read a book her blood pressure normalized.  Creatinine is slightly elevated above her baseline on losartan 25 mg daily.  Discussed the possible morbidity/mortality concern related to this per BMJ article.    Plan  -shared decsionmaing, offered to trial change to another medication, patient elects to increase hydration to a min 2 L water per day.  Cont losartan 25mg qday.  Will check BMP at next follow-up for hypertension.  -cont diet/exercise/low salt diet  -previously cautioned any chest pain or shortness of breath > 5 minutes, any significant motor weakness, speech issues, or facial droop, cautioned patient to report  to the emergency department immediately.          mild tsh fluctauation due to age, likely no current clinical significacne  -No clear symptoms referable to TSH, denies hot or cold intolerance, palpitations, skin or hair changes, does have chronic intermittent constipation that precedes TSH change and was present when patient had a normal TSH.  No thyromegaly.  -given age >70, TSH < 6.9, no clear symptoms, prior tsh nl, suggested we not treat at present and instead repeat TSH in 2-6 months, patient in agreement.  This is consistent with up-to-date recommendations.  Today: repeat TSH nl, d/w patient.  Follow clinicaly      Elevated ldl and ASCVD  -recently 177., prior 151.  Prev discussed, patient was not interested in statin at prior annual exam.  Given the LDL is now above 160 and patient has developed the additional risk factor of evening hypertension, and patient's ASCVD is elevated (above 30%, although a significant portion of this is age-related), revisited the possibility of statin initiation.  Patient is amenable.  Recent LFTs normal.  Patient reports was previously on atorvastatin but was stopped due to possible myalgias, will begin rosuvastatin 10 mg once daily, follow-up cholesterol levels in approximately 3 months.  Continue with hypertension treatment and diet/exercise interventions as previously discussed.  Today: LDL 97 on crestor 10, excellent response.  Patient is reporting mild daily cramps that last for approximately 1 minute and resolve with massage in the setting of prior cramping issues, however patient reports these are slightly different.  Discussed the possibility of transitioning to pravastatin, patient prefers to continue on Crestor 10 mg daily.  Counseled any new or worsening cramping to alert me.    Cmp nl except for mildly elevated bg, reviewed but not discussed, can discuss diet at follow-up.    From Prior Visit, not addressed:      Chronic dry cough, significantly improved but still  "present, possibly   secondary to postnasal drip  -Began after starting lisinopril, improved after discontinuing lisinopril but still occuring, patient has no smoking history, no shortness of breath, and lungs have serially been without wheezing, making asthma or COPD less likely.  Prevoiusly and currently patient denied post nasal drip symptoms.  Did mention history of GERD but no current symptoms of heartburn.    Currently cough occurs 2-3 times per day, not bothering pt.      Plan:   -Discussed patient could trial netti pot 1-2x daily if she would like as this is a very low risk intervention, etiology is not completely clear.  Patient states symptoms are not really bothering her, also reasonable to follow clinically              Ca counseling in the setting of calcium channel blocker  -Patient inquiring whether calcium supplementation could be harmful or her bone health could be affected by calcium channel blocker/amlodipine.  Discussed I am unaware of this being an issue, review of up-to-date adverse reactions does not list osteopenia or porosis as an issue, although osteoarthritis is listed as a very rare possible side effect.  Discussed with patient.  Also reviewed the recommendation of 1200 mg total calcium including diet and supplementation, suggested patient specifically quantitate her dietary intake as she reports eating dairy.      Mild hyponatremia during recent ED visit for HA  -resolved, follow clinicaly.  Discussed lab results with patient.    Patient reports now trialing meditation, anxiety was not otherwise discussed today.      Anxiety with panic attacks  -no SI/HI/AH/VH.  Patient reports \"all her life\" she has been anxious.  She notes worrying about many things in general, although denies worrying about \"the little things\".  She states that she will begin to ruminate on things and then at times this will trigger periods where she has a fast heart rate, develops a headache, and feels significantly " "anxious for a period of 1 to 2 hours, where this then subsides.  Denies obsessions or rituals thus OCD unlikely, denies embarrassment around socialization thus social anxiety disorder unlikely, denies concern about \"escaping to safety\" thus agoraphobia unlikely, do need to clarify whether there is an inciting traumatic event.  However, symptoms seem diffuse.  PHQ 2 negative making depression unlikely.  Symptoms are likely consistent with panic attacks with possible generalized anxiety disorder versus elevated anxiety      Plan  -continue diet/exercise  -discussed pathophysiology of anxiety/panic attacks generally, rec cbt resources.  Anxiety resournces hangout given.  -Safety contract reviewed  -Follow-up next visit on CBT resources and also jarred screen  -Patient now trialing [facility] mindful/meditation (reported previous visit, not otherwise addressed)    Healthcare Maintenance (from last visit, not yet covered)    Suggest fit testing if wishes to continue, depression/anxiety/caregiver/ a1c screening/constipation.    Colonoscopy: 2017, GI following and she diann lschedule w/I next year, denies stool changes  HIV: neg 2022  Syphilis: neg 2022  Hep C: neg 2022  A1c: fbs previously 100, a1c 5.6, pt acctively working on diet/exercise.  Check a1c for monioring  ASCVD: prev discussed, pt prefers diet/exercise focus which is reasonable as her elevated ascvd largely age related, also discus coronary artery scoring/ct, pt prefers to observe with diet/exercise at present.  Denies cp/sob with exertion.  Smoking: never  Alcohol Use: never  Recreational Drugs: never  Healthy Eating/Exercise: discussed veg inclined diet evidence based and exericse, patient is very physically active doing 1 hour walks, aquatherapy/exercise, home PT exercises, praised patients healthy efforts  Depression and Anxiety: neg phq2.  States some anxiety about age and future and what will happen to her  (who has dementia) if she was to pass away. " States feeling ok overall though, declcines making visit to discuss further.  Offered to help support the patient at any time such as with coordinating support for care for her .  Immunizations: utd covid, pna, tdap, shingrix, flu shot    Mammogram: nl 2022, repeat 2024 if wishes.  PAP: s/p no non-cancer, prior pap nl, no further monitoring needed  DEXA: osteoporosis on tx per endo, apprecite support  Goals of Care: has advanced directive                Leg pressure likely 2/2 mild venous insufficeincy  -Prev: Varicosities on legs and symptoms are consistent with possible mild venous insufficiency.-Discussed pathophysiology.    Today: tried compression stockings via lymphedema clinic, did not help and perhaps worsened.  As an aside, reports prior HERMINIA nl.  Overall daily sx but improved with stretching, exercise, massage, leg elevation.  Sx may have some overlap with neuropathy sx from prior back surgery, as states today that sx are going above knees.  Overall sx not significantly bothersome per patient, will follow clinically for now.    Calf muscle cramps, etiology unclear  -Continue stretching and hydration  -Potassium magnesium and calcium all normal, discussed with patient  -Patient has tried taking a multivitamin with B vitamins, and in fact her B6 vitamin level was previously supratherapeutic, she reports no change in Symptomatology with this, as such likely no utility in trialing B vitamin supplementation.  Discussed possibility of using diltiazem, patient politely declines at present, stating that the cramps are only lasting a few minutes when they occur and that overall they are tolerable.  Discussed that we could revisit this if worsens, and that quinidine is another option although has a very significant side effect profile and would not recommend unless cramps are debilitating.  -subsequent visit:Patient reports calf cramping is only intermittently occurring, is doing the stretches daily and feels  these are potentially helping.  Not otherwise addressed.  -today:. Re[rpts improved with calf strecthing, hot tub.  Occasionally happenig now, not significantly bothering patient, follow clinnically      History of functional murmur  -Echo had only mild valvular abnormalities and aortic sclerosis, otherwise normal.  Discussed with patient, continue diet and exercise, no further intervention needed.  Likely flow murmur.    Mild leukopenia, etiology unclear  -Patient previously denied fevers, chills, night sweats, well-appearing, weight is stable in EMR.  Was previously slightly elevated at 13.2, now 4.3.  since drawing labs as above, will repeat cbc with manual diff to trend wbc.  -repeta cbc nl, follow clinically.    Prediabtes, resolved  -Resolved on repeat A1c, praised patient, continue diet and exercise.    ASCVD discussion, began and partially discussed.  -LDL is 151, given that the patient is not diabetic, has no history of cardiovascular disease, is a non-smoker, and is not hypertensive, her elevated ASCVD would be mostly age-related at this point.  Discussed with patient that overall healthy lifestyle interventions would be the most effective and appropriate thing to reduce her risk of cardiovascular disease, patient in agreement with this plan.  At follow-up can complete this discussion but discussing the age component of the ASCVD elevation and her preference to treat this versus not with statin therapy.    Constipation, improved  -Not fully addressed today.  Patient reports daily bowel movements although they are somewhat hard, states that she is eating increased fiber and using MiraLAX as needed.  Offered to discuss further at future visit, patient states she is fine with monitoring for now.     Spinal stenosis - appreciate neuro support, encourage patient to follow-up with them regarding any future management of her gabapentin or symptoms.    Endocrine referral placed per patient request for injectible  osteoporosis therapy to re-establish, apprecirae support.  Counseled that need for repeating DEXA scans on medical therapy for osteoporosis is not clear, probably would not benefit with repeat before 5 years, but will defer to endocrine given they are the primary team managing her Forteo injections.  Also noted that pth ordered by her previous endocrinologist was low, but ca and vit d nl.  Defer to endocrine for further mgmt.    Counseled patient that if she is eating a well totally well-balanced/healthy diet that she does not need to supplement with vitamins, and that they are not FDA approved so could contain contaminants.    Return to clinic 2 months for neck pain, leg cramps, anxiety. . Then annual exam/diet.

## 2024-04-29 RX ORDER — LOSARTAN POTASSIUM 25 MG/1
25 TABLET ORAL DAILY
Qty: 90 TABLET | Refills: 1 | Status: SHIPPED | OUTPATIENT
Start: 2024-04-29

## 2024-04-29 NOTE — TELEPHONE ENCOUNTER
Received request via: Pharmacy    Was the patient seen in the last year in this department? Yes    Does the patient have an active prescription (recently filled or refills available) for medication(s) requested? No    [facility]    Does the patient have group home Plus and need 100 day supply (blood pressure, diabetes and cholesterol meds only)? Patient does not have SCP

## 2024-06-13 ENCOUNTER — OFFICE VISIT (OUTPATIENT)
Dept: INTERNAL MEDICINE | Facility: OTHER | Age: 84
End: 2024-06-13
Payer: COMMERCIAL

## 2024-06-13 VITALS
HEIGHT: 61 IN | HEART RATE: 76 BPM | BODY MASS INDEX: 20.69 KG/M2 | WEIGHT: 109.6 LBS | DIASTOLIC BLOOD PRESSURE: 63 MMHG | TEMPERATURE: 97.9 F | SYSTOLIC BLOOD PRESSURE: 116 MMHG | OXYGEN SATURATION: 94 %

## 2024-06-13 DIAGNOSIS — G62.89 OTHER POLYNEUROPATHY: ICD-10-CM

## 2024-06-13 DIAGNOSIS — I10 HYPERTENSION, UNSPECIFIED TYPE: ICD-10-CM

## 2024-06-13 DIAGNOSIS — I87.2 VENOUS INSUFFICIENCY OF BOTH LOWER EXTREMITIES: ICD-10-CM

## 2024-06-13 DIAGNOSIS — M62.838 NECK MUSCLE SPASM: ICD-10-CM

## 2024-06-13 PROCEDURE — 99214 OFFICE O/P EST MOD 30 MIN: CPT | Performed by: INTERNAL MEDICINE

## 2024-06-13 PROCEDURE — 3074F SYST BP LT 130 MM HG: CPT | Performed by: INTERNAL MEDICINE

## 2024-06-13 PROCEDURE — 3078F DIAST BP <80 MM HG: CPT | Performed by: INTERNAL MEDICINE

## 2024-06-13 ASSESSMENT — FIBROSIS 4 INDEX: FIB4 SCORE: 1.59

## 2024-06-13 NOTE — PROGRESS NOTES
"Subjective     Antonio Treviño is a 81 y.o. female who presents with Hypertension (Follow up)            HPI   83 year old woman with spinal stenosis s/p laminectomy complicated by periperal neuropathy, also with compression fracture - followed by neuro, osteoporosis on forteo followed by [facility], hx bppv s/p Epley with resolution, improved dry eye in setting of prior eye surgery, L wrist sprain s/p surgery and subsequent fracture, functional murmur (echo confirmed) here for follow-up of venous insufficiency, neck pain.  Reports neck pain is improving with home exercises,    Has unfortunately not seen physical therapy yet, is booked in July (first available)    Reports continued symptoms in the bilateral lower legs with a \"tightness\" that gradually develops as the day goes on that is uncomfortable, worse with prolonged standing, does not occur when she is hiking/walking, better with leg elevation.  Also noting some enlarged veins on her legs.  No significant trauma reported to her legs in the past.  She does have a history of peripheral neuropathy in the bilateral legs in the setting of spinal stenosis status post surgery that was followed by neurology, although she is requiring referral to reestablish with neurology given her neurologist is now part of [facility].  She states that she tried her fitted compression stockings from the lymphedema center and also less tight fitting high socks but both of those are uncomfortable in the setting of her peripheral neuropathy.        ROS           Objective     VSS    Physical Exam  General: Pleasant woman in no apparent distress, alert, responding appropriately.   Ext: varicose veins b/l, no b/l le edema, sensation grossly intact b/l.  No wounds on feet b/l, mild dry skin.  Neg Ipwichtouch test b/l.                Assessment & Plan     Neck pressure likely 2/2 paraspinal/trapezius muscle spasms and poor posture/biomechanics, now worsening with intermittent " "radiculopathy  -No alarm symptoms.  Significant neck limitation of motion in addition to tight trapezius musculature along with poor biomechanics while reading and baseline hunched posture are likely contributing to neck spasms which are for what ever reason exacerbated when she is standing and walking.  Notes resolution when she lies on her back.  Suspect poor posture/biomechanics along with potentially anxiety/stress are contributing to tight muscles.  Tried Home physical therapy for neck mobilization and hot tub which initially improved her symptoms, although have worsened.  XR neck normal.    Plan  -Continue home PT pending establishment with formal PT in July, appreciate support  -Reviewed recommendations on ergonomics and proper posture, including pillow positioning    -alarm precautions reviewed  -Follow-up in 2 months.      Leg pressure likely 2/2 mild venous insufficeincy  -Prev: Varicosities on legs and symptoms are consistent with possible mild venous insufficiency.Discussed pathophysiology.    Today: tried compression stockings via lymphedema clinic, did not help and perhaps worsened.  As an aside, reports prior HERMINIA nl.  Overall daily sx but improved with stretching, exercise, massage, leg elevation.  Sx may have some overlap with neuropathy sx from prior back surgery, as states today that sx are going above knees.  Overall sx initially were not bothersome to the patient, but per report are now's gradually worsening and becoming bothersome.    Plan  -Continue leg elevation, healthy physical activity  -Discussed prolonged standing will exacerbate  -Patient is a healthy BMI  -Patient reports is unable to tolerate compression stockings due to her peripheral neuropathy, if she is able to better optimize her peripheral neuropathy perhaps she could tolerate compression stockings which would be helpful.  She reports trialing gabapentin and Lyrica without success (\"brain fog\"), has tried other medications as " "well.  Referral placed for her to reestablish with her neurologist Dr. Osborn for further management, appreciate support  -Referral to vein center for possible additional suggestions or intervention, did discuss briefly some of the pros and cons related to vein stripping procedures    Bmp to prep for htn f/u, patient reports stable at home, otherwise not addressed    Hx peripheral neuropathy in setting of spinal stenosis  -reports has tried gabapentin, lyrica, other meds without success.  -foot exam benign, recommended lotion to mild dry spots with socks over her feet to prevent slipping if she would like  -referral to re-establish with Dr. Osborn, appreciate support.  Defer further management to Dr. Osborn    From Prior Visit, not addressed:    Anxiety with panic attacks - jarred screen  -no SI/HI/AH/VH.  Patient reports \"all her life\" she has been anxious.  She notes worrying about many things in general, although denies worrying about \"the little things\".  She states that she will begin to ruminate on things and then at times this will trigger periods where she has a fast heart rate, develops a headache, and feels significantly anxious for a period of 1 to 2 hours, where this then subsides.  Denies obsessions or rituals thus OCD unlikely, denies embarrassment around socialization thus social anxiety disorder unlikely, denies concern about \"escaping to safety\" thus agoraphobia unlikely, do need to clarify whether there is an inciting traumatic event.  However, symptoms seem diffuse.  PHQ 2 negative making depression unlikely.  Symptoms are likely consistent with panic attacks with possible generalized anxiety disorder versus elevated anxiety      Plan  -continue diet/exercise  -discussed pathophysiology of anxiety/panic attacks generally, rec cbt resources.  Anxiety resournces hangout given.  -Safety contract reviewed  -Follow-up next visit on CBT resources and also jarred screen  -Patient now trialing [facility] " mindful/meditation (reported previous visit, not otherwise addressed)      HTN  -not lightheaded.  bp at goal today.  patient brings home BP log, all readings are at goal <130/80 with the exception of evening readings, however patient realized that she was always taking her evening readings immediately after watching movies which made her excited, if she waited 20 minutes and read a book her blood pressure normalized.  Creatinine is slightly elevated above her baseline on losartan 25 mg daily.  Discussed the possible morbidity/mortality concern related to this per BMJ article.    Plan  -shared decsionmaing, offered to trial change to another medication, patient elects to increase hydration to a min 2 L water per day.  Cont losartan 25mg qday.  Will check BMP at next follow-up for hypertension.  -cont diet/exercise/low salt diet  -previously cautioned any chest pain or shortness of breath > 5 minutes, any significant motor weakness, speech issues, or facial droop, cautioned patient to report to the emergency department immediately.          mild tsh fluctauation due to age, likely no current clinical significacne  -No clear symptoms referable to TSH, denies hot or cold intolerance, palpitations, skin or hair changes, does have chronic intermittent constipation that precedes TSH change and was present when patient had a normal TSH.  No thyromegaly.  -given age >70, TSH < 6.9, no clear symptoms, prior tsh nl, suggested we not treat at present and instead repeat TSH in 2-6 months, patient in agreement.  This is consistent with up-to-date recommendations.  Today: repeat TSH nl, d/w patient.  Follow clinicaly      Elevated ldl and ASCVD  -recently 177., prior 151.  Prev discussed, patient was not interested in statin at prior annual exam.  Given the LDL is now above 160 and patient has developed the additional risk factor of evening hypertension, and patient's ASCVD is elevated (above 30%, although a significant portion  of this is age-related), revisited the possibility of statin initiation.  Patient is amenable.  Recent LFTs normal.  Patient reports was previously on atorvastatin but was stopped due to possible myalgias, will begin rosuvastatin 10 mg once daily, follow-up cholesterol levels in approximately 3 months.  Continue with hypertension treatment and diet/exercise interventions as previously discussed.  Today: LDL 97 on crestor 10, excellent response.  Patient is reporting mild daily cramps that last for approximately 1 minute and resolve with massage in the setting of prior cramping issues, however patient reports these are slightly different.  Discussed the possibility of transitioning to pravastatin, patient prefers to continue on Crestor 10 mg daily.  Counseled any new or worsening cramping to alert me.          Chronic dry cough, significantly improved but still present, possibly   secondary to postnasal drip  -Began after starting lisinopril, improved after discontinuing lisinopril but still occuring, patient has no smoking history, no shortness of breath, and lungs have serially been without wheezing, making asthma or COPD less likely.  Prevoiusly and currently patient denied post nasal drip symptoms.  Did mention history of GERD but no current symptoms of heartburn.    Currently cough occurs 2-3 times per day, not bothering pt.      Plan:   -Discussed patient could trial netti pot 1-2x daily if she would like as this is a very low risk intervention, etiology is not completely clear.  Patient states symptoms are not really bothering her, also reasonable to follow clinically              Ca counseling in the setting of calcium channel blocker  -Patient inquiring whether calcium supplementation could be harmful or her bone health could be affected by calcium channel blocker/amlodipine.  Discussed I am unaware of this being an issue, review of up-to-date adverse reactions does not list osteopenia or porosis as an  issue, although osteoarthritis is listed as a very rare possible side effect.  Discussed with patient.  Also reviewed the recommendation of 1200 mg total calcium including diet and supplementation, suggested patient specifically quantitate her dietary intake as she reports eating dairy.      Mild hyponatremia during recent ED visit for HA  -resolved, follow clinicaly.  Discussed lab results with patient.      Healthcare Maintenance (from last visit, not yet covered)    Suggest fit testing if wishes to continue, depression/anxiety/caregiver/ a1c screening/constipation.    Colonoscopy: 2017, GI following and she diann lschedule w/I next year, denies stool changes  HIV: neg 2022  Syphilis: neg 2022  Hep C: neg 2022  A1c: fbs previously 100, a1c 5.6, pt acctively working on diet/exercise.  Check a1c for monioring  ASCVD: prev discussed, pt prefers diet/exercise focus which is reasonable as her elevated ascvd largely age related, also discus coronary artery scoring/ct, pt prefers to observe with diet/exercise at present.  Denies cp/sob with exertion.  Smoking: never  Alcohol Use: never  Recreational Drugs: never  Healthy Eating/Exercise: discussed veg inclined diet evidence based and exericse, patient is very physically active doing 1 hour walks, aquatherapy/exercise, home PT exercises, praised patients healthy efforts  Depression and Anxiety: neg phq2.  States some anxiety about age and future and what will happen to her  (who has dementia) if she was to pass away. States feeling ok overall though, declcines making visit to discuss further.  Offered to help support the patient at any time such as with coordinating support for care for her .  Immunizations: utd covid, pna, tdap, shingrix, flu shot    Mammogram: nl 2022, repeat 2024 if wishes.  PAP: s/p no non-cancer, prior pap nl, no further monitoring needed  DEXA: osteoporosis on tx per endo, apprecite support  Goals of Care: has advanced  directive                  Calf muscle cramps, etiology unclear  -Continue stretching and hydration  -Potassium magnesium and calcium all normal, discussed with patient  -Patient has tried taking a multivitamin with B vitamins, and in fact her B6 vitamin level was previously supratherapeutic, she reports no change in Symptomatology with this, as such likely no utility in trialing B vitamin supplementation.  Discussed possibility of using diltiazem, patient politely declines at present, stating that the cramps are only lasting a few minutes when they occur and that overall they are tolerable.  Discussed that we could revisit this if worsens, and that quinidine is another option although has a very significant side effect profile and would not recommend unless cramps are debilitating.  -subsequent visit:Patient reports calf cramping is only intermittently occurring, is doing the stretches daily and feels these are potentially helping.  Not otherwise addressed.  -today:. Reports improved with calf strecthing, hot tub.  Occasionally happenig now, not significantly bothering patient, follow clinnically      History of functional murmur  -Echo had only mild valvular abnormalities and aortic sclerosis, otherwise normal.  Discussed with patient, continue diet and exercise, no further intervention needed.  Likely flow murmur.    Mild leukopenia, etiology unclear  -Patient previously denied fevers, chills, night sweats, well-appearing, weight is stable in EMR.  Was previously slightly elevated at 13.2, now 4.3.  since drawing labs as above, will repeat cbc with manual diff to trend wbc.  -repeta cbc nl, follow clinically.    Prediabtes, resolved  -Resolved on repeat A1c, praised patient, continue diet and exercise.    ASCVD discussion, began and partially discussed.  -LDL is 151, given that the patient is not diabetic, has no history of cardiovascular disease, is a non-smoker, and is not hypertensive, her elevated ASCVD would  be mostly age-related at this point.  Discussed with patient that overall healthy lifestyle interventions would be the most effective and appropriate thing to reduce her risk of cardiovascular disease, patient in agreement with this plan.  At follow-up can complete this discussion but discussing the age component of the ASCVD elevation and her preference to treat this versus not with statin therapy.    Constipation, improved  -Not fully addressed today.  Patient reports daily bowel movements although they are somewhat hard, states that she is eating increased fiber and using MiraLAX as needed.  Offered to discuss further at future visit, patient states she is fine with monitoring for now.     Spinal stenosis - appreciate neuro support, encourage patient to follow-up with them regarding any future management of her gabapentin or symptoms.    Endocrine referral placed per patient request for injectible osteoporosis therapy to re-establish, apprecirae support.  Counseled that need for repeating DEXA scans on medical therapy for osteoporosis is not clear, probably would not benefit with repeat before 5 years, but will defer to endocrine given they are the primary team managing her Forteo injections.  Also noted that pth ordered by her previous endocrinologist was low, but ca and vit d nl.  Defer to endocrine for further mgmt.    Counseled patient that if she is eating a well totally well-balanced/healthy diet that she does not need to supplement with vitamins, and that they are not FDA approved so could contain contaminants.    Return to clinic 2-3 months for htn htn/elevated ascvd, neck pain.  Then anxiety. . leg cramps/venous insufficiency., Then annual exam/diet.

## 2024-06-25 ENCOUNTER — TELEPHONE (OUTPATIENT)
Dept: HEALTH INFORMATION MANAGEMENT | Facility: OTHER | Age: 84
End: 2024-06-25
Payer: COMMERCIAL

## 2024-06-27 ENCOUNTER — OFFICE VISIT (OUTPATIENT)
Dept: NEUROLOGY | Facility: MEDICAL CENTER | Age: 84
End: 2024-06-27
Attending: SPECIALIST
Payer: COMMERCIAL

## 2024-06-27 VITALS
OXYGEN SATURATION: 95 % | BODY MASS INDEX: 20.6 KG/M2 | HEART RATE: 77 BPM | TEMPERATURE: 96.9 F | DIASTOLIC BLOOD PRESSURE: 56 MMHG | WEIGHT: 109.13 LBS | HEIGHT: 61 IN | SYSTOLIC BLOOD PRESSURE: 110 MMHG

## 2024-06-27 DIAGNOSIS — M54.16 BILATERAL LUMBAR RADICULOPATHY: ICD-10-CM

## 2024-06-27 PROCEDURE — 3078F DIAST BP <80 MM HG: CPT | Performed by: SPECIALIST

## 2024-06-27 PROCEDURE — 3074F SYST BP LT 130 MM HG: CPT | Performed by: SPECIALIST

## 2024-06-27 PROCEDURE — 99211 OFF/OP EST MAY X REQ PHY/QHP: CPT | Performed by: SPECIALIST

## 2024-06-27 PROCEDURE — 99214 OFFICE O/P EST MOD 30 MIN: CPT | Performed by: SPECIALIST

## 2024-06-27 RX ORDER — GABAPENTIN 100 MG/1
100 CAPSULE ORAL 2 TIMES DAILY
Qty: 60 CAPSULE | Refills: 11 | Status: SHIPPED | OUTPATIENT
Start: 2024-06-27

## 2024-06-27 ASSESSMENT — FIBROSIS 4 INDEX: FIB4 SCORE: 1.59

## 2024-06-27 NOTE — PROGRESS NOTES
Subjective     Antonio Treviño is a 83 y.o. female who presents with New Patient (Polyneuropathy )            HPI  Mrs. Antonio Treviño is an 83-year-old woman with a history of lower extremity pain.  She had lumbar MRIs as far back as January 2014 that revealed multilevel degenerative changes.  lumbar MRI scan in January 2016 revealed acute/subacute L2 superior endplate compression fracture with approximately 50% loss of height anteriorly and 5 mm of retropulsion of posterior vertebral body cortex.  There was an interval increase in broad-based posterior disc protrusion at L3-4 with no extruded disc component extending cephalad in the vertical epidural space for approximately 8 mm resulting in increased central canal stenosis.  She was seen by Renaldo Welch and referred for an EMG which was done by Dr. Quintero on 10/31/2019 revealed denervation in the L3-5 paraspinous muscles on the left.  She underwent a lumbar decompressive procedure by Marcellus Land on May 29, 2020 but her symptoms did not change.  She has continued pain in both legs.  Is worse when she is up on her legs, when she is reclining the pain significantly improves.  She had a repeat EMG on February 3, 2021 that revealed chronic inactive reinnervation changes in the left vastus medialis/lateralis which can be seen in an old L2-4 lumbar sacral radiculopathy that itself was not diagnostic.  She did not have a large fiber neuropathy, sural peroneal and tibial conduction studies were all normal.  She was referred to pain management and had epidural injections which did not help.  Pain management referred her to me and I started her on gabapentin.  She was taking 100 mg in the morning and 200 mg at night but states she felt sedated on the medication and had difficulty tolerating it.  Currently she notes minimal pain in the morning but when she is up on her feet during the day she notes more pain gradually in her lower legs and feet as the day progresses.  At night  "when she lies down she does not have significant pain.    Past medical history:    1.  Lumbar compression fracture as above.    2.  Lumbar decompression as above.  MRIs of show L3-5 laminectomies.    3.  Probable venous insufficiency in her legs.    4.  Status post hysterectomy.    Medications Crestor 10 mg daily, Cozaar 25 mg daily, calcium carbonate    Allergies-codeine and morphine    Social history-she does not smoke or drink.    Review of symptoms-she reports pain in her legs and feet when she is up on them which goes away when she reclines.           Objective     /56 (BP Location: Left arm, Patient Position: Sitting, BP Cuff Size: Small adult)   Pulse 77   Temp 36.1 °C (96.9 °F) (Temporal)   Ht 1.549 m (5' 0.98\")   Wt 49.5 kg (109 lb 2 oz)   SpO2 95%   BMI 20.63 kg/m²      Physical Exam  Patient is a cooperative woman who is alert.  Speech is fluent mental status is grossly intact.    HEENT exam reveals normocephalic.  Frequently reactive.  EMs are full visual fields are full.    She has right hemifacial spasm.  Her neck is supple.    She has tenderness to palpation over both tarsal metatarsal regions.            Neurological Exam  She stands without difficulty.  Her gait is unremarkable.  She has no drift and no dysmetria.  Motor testing reveals intact bulk tone and strength throughout.    She has intact pin sensation in both feet.    Reflexes 1+ at the arms and knees absent at the ankles and she has downgoing toes.    Cranial nerves are remarkable for right hemifacial spasm.           Assessment & Plan        1. Bilateral lumbar radiculopathy  Mrs. Antonio Treviño is an 83-year-old woman who sustained a lumbar compression fracture in 2016.  She was noted on a 2019 EMG to be denervated in the left L3-5 paraspinous muscles indicating she had lumbar radiculopathy which at that time was likely chronic.  She developed lower extremity pain following a compression fracture.  She underwent an L3-5 " decompressive laminectomy on May 29, 2020 but her symptoms did not improve.  She has had 2 EMGs and does not have a large fiber neuropathy.  She could have a small fiber neuropathy but likely has chronic radiculopathy due to compression fracture.  She has had a workup for metabolic causes of neuropathy which was negative.  She improved with gabapentin but felt sedated with the medication, her  has Alzheimer's disease and she has to care for him.  She will try taking 100 mg of gabapentin daily see if that helps.  In addition she is tender over her tarsal metatarsal region bilaterally and as a result is referred to podiatry.  Will see him back in 6 months.  - gabapentin (NEURONTIN) 100 MG Cap; Take 1 Capsule by mouth 2 times a day.  Dispense: 60 Capsule; Refill: 11  - Referral to Podiatry

## 2024-06-28 ENCOUNTER — TELEPHONE (OUTPATIENT)
Dept: VASCULAR LAB | Facility: MEDICAL CENTER | Age: 84
End: 2024-06-28
Payer: COMMERCIAL

## 2024-06-28 NOTE — TELEPHONE ENCOUNTER
Called to confirm if this will be first time patient is Vascular Med provider and review if any recent testing completed or hospital admissions. No answer, left voicemail to call back.    Correct appointment type? YES   Referral attached to appointment? YES   New patient packet sent via [employer]?  YES

## 2024-07-03 ENCOUNTER — OFFICE VISIT (OUTPATIENT)
Dept: VASCULAR LAB | Facility: MEDICAL CENTER | Age: 84
End: 2024-07-03
Attending: FAMILY MEDICINE
Payer: COMMERCIAL

## 2024-07-03 VITALS
WEIGHT: 109 LBS | BODY MASS INDEX: 19.31 KG/M2 | HEIGHT: 63 IN | HEART RATE: 78 BPM | SYSTOLIC BLOOD PRESSURE: 128 MMHG | DIASTOLIC BLOOD PRESSURE: 68 MMHG

## 2024-07-03 DIAGNOSIS — I87.2 CHRONIC VENOUS INSUFFICIENCY: ICD-10-CM

## 2024-07-03 DIAGNOSIS — I83.813 VARICOSE VEINS OF BILATERAL LOWER EXTREMITIES WITH PAIN: ICD-10-CM

## 2024-07-03 PROBLEM — I83.893 VARICOSE VEINS OF LEG WITH SWELLING, BILATERAL: Status: ACTIVE | Noted: 2024-07-03

## 2024-07-03 PROCEDURE — 99212 OFFICE O/P EST SF 10 MIN: CPT

## 2024-07-03 PROCEDURE — 99203 OFFICE O/P NEW LOW 30 MIN: CPT | Performed by: FAMILY MEDICINE

## 2024-07-03 PROCEDURE — 3074F SYST BP LT 130 MM HG: CPT | Performed by: FAMILY MEDICINE

## 2024-07-03 PROCEDURE — 3078F DIAST BP <80 MM HG: CPT | Performed by: FAMILY MEDICINE

## 2024-07-03 RX ORDER — DIOSMIN 100 %
1000 POWDER (GRAM) MISCELLANEOUS
COMMUNITY
Start: 2024-07-03

## 2024-07-03 ASSESSMENT — ENCOUNTER SYMPTOMS
PALPITATIONS: 0
HEMOPTYSIS: 0
PND: 0
ORTHOPNEA: 0
SPUTUM PRODUCTION: 0
SHORTNESS OF BREATH: 0
CHILLS: 0
FEVER: 0
CLAUDICATION: 0
COUGH: 0
WHEEZING: 0

## 2024-07-03 ASSESSMENT — FIBROSIS 4 INDEX: FIB4 SCORE: 1.59

## 2024-07-08 ENCOUNTER — APPOINTMENT (OUTPATIENT)
Dept: PHYSICAL THERAPY | Facility: REHABILITATION | Age: 84
End: 2024-07-08
Attending: INTERNAL MEDICINE
Payer: COMMERCIAL

## 2024-07-11 ENCOUNTER — APPOINTMENT (OUTPATIENT)
Dept: PHYSICAL THERAPY | Facility: REHABILITATION | Age: 84
End: 2024-07-11
Attending: INTERNAL MEDICINE
Payer: COMMERCIAL

## 2024-07-15 ENCOUNTER — APPOINTMENT (OUTPATIENT)
Dept: PHYSICAL THERAPY | Facility: REHABILITATION | Age: 84
End: 2024-07-15
Attending: INTERNAL MEDICINE
Payer: COMMERCIAL

## 2024-07-18 ENCOUNTER — APPOINTMENT (OUTPATIENT)
Dept: PHYSICAL THERAPY | Facility: REHABILITATION | Age: 84
End: 2024-07-18
Attending: INTERNAL MEDICINE
Payer: COMMERCIAL

## 2024-07-22 ENCOUNTER — APPOINTMENT (OUTPATIENT)
Dept: PHYSICAL THERAPY | Facility: REHABILITATION | Age: 84
End: 2024-07-22
Attending: INTERNAL MEDICINE
Payer: COMMERCIAL

## 2024-07-25 ENCOUNTER — APPOINTMENT (OUTPATIENT)
Dept: PHYSICAL THERAPY | Facility: REHABILITATION | Age: 84
End: 2024-07-25
Attending: INTERNAL MEDICINE
Payer: COMMERCIAL

## 2024-07-29 ENCOUNTER — APPOINTMENT (OUTPATIENT)
Dept: PHYSICAL THERAPY | Facility: REHABILITATION | Age: 84
End: 2024-07-29
Attending: INTERNAL MEDICINE
Payer: COMMERCIAL

## 2024-08-01 ENCOUNTER — APPOINTMENT (OUTPATIENT)
Dept: PHYSICAL THERAPY | Facility: REHABILITATION | Age: 84
End: 2024-08-01
Attending: INTERNAL MEDICINE
Payer: COMMERCIAL

## 2024-08-05 ENCOUNTER — HOSPITAL ENCOUNTER (OUTPATIENT)
Dept: LAB | Facility: MEDICAL CENTER | Age: 84
End: 2024-08-05
Attending: INTERNAL MEDICINE
Payer: COMMERCIAL

## 2024-08-05 ENCOUNTER — APPOINTMENT (OUTPATIENT)
Dept: PHYSICAL THERAPY | Facility: REHABILITATION | Age: 84
End: 2024-08-05
Attending: INTERNAL MEDICINE
Payer: COMMERCIAL

## 2024-08-05 LAB
ANION GAP SERPL CALC-SCNC: 9 MMOL/L (ref 7–16)
BUN SERPL-MCNC: 19 MG/DL (ref 8–22)
CALCIUM SERPL-MCNC: 9.2 MG/DL (ref 8.4–10.2)
CHLORIDE SERPL-SCNC: 102 MMOL/L (ref 96–112)
CO2 SERPL-SCNC: 26 MMOL/L (ref 20–33)
CREAT SERPL-MCNC: 0.68 MG/DL (ref 0.5–1.4)
FASTING STATUS PATIENT QL REPORTED: NORMAL
GFR SERPLBLD CREATININE-BSD FMLA CKD-EPI: 85 ML/MIN/1.73 M 2
GLUCOSE SERPL-MCNC: 108 MG/DL (ref 65–99)
POTASSIUM SERPL-SCNC: 4.4 MMOL/L (ref 3.6–5.5)
SODIUM SERPL-SCNC: 137 MMOL/L (ref 135–145)

## 2024-08-05 PROCEDURE — 80048 BASIC METABOLIC PNL TOTAL CA: CPT

## 2024-08-05 PROCEDURE — 36415 COLL VENOUS BLD VENIPUNCTURE: CPT

## 2024-08-07 ENCOUNTER — TELEPHONE (OUTPATIENT)
Dept: INTERNAL MEDICINE | Facility: OTHER | Age: 84
End: 2024-08-07
Payer: COMMERCIAL

## 2024-08-07 NOTE — TELEPHONE ENCOUNTER
Phone Number Called: [phone] (home)      Call outcome: Did not leave a detailed message. Requested patient to call back.    Message: Please call back and keep next appointment with Dr Stewart on 08/15/2024.

## 2024-08-07 NOTE — TELEPHONE ENCOUNTER
----- Message from Luis Daniel Bui Ass't sent at 8/7/2024 11:32 AM PDT -----    ----- Message -----  From: Kalli Short M.D.  Sent: 8/7/2024  11:26 AM PDT  To: Néstor Rossi Ma    Labs reviewed, pls keep your appt as sched to discuss further

## 2024-08-15 ENCOUNTER — OFFICE VISIT (OUTPATIENT)
Dept: INTERNAL MEDICINE | Facility: OTHER | Age: 84
End: 2024-08-15
Payer: COMMERCIAL

## 2024-08-15 ENCOUNTER — HOSPITAL ENCOUNTER (EMERGENCY)
Facility: MEDICAL CENTER | Age: 84
End: 2024-08-15
Attending: STUDENT IN AN ORGANIZED HEALTH CARE EDUCATION/TRAINING PROGRAM
Payer: COMMERCIAL

## 2024-08-15 ENCOUNTER — APPOINTMENT (OUTPATIENT)
Dept: RADIOLOGY | Facility: MEDICAL CENTER | Age: 84
End: 2024-08-15
Attending: STUDENT IN AN ORGANIZED HEALTH CARE EDUCATION/TRAINING PROGRAM
Payer: COMMERCIAL

## 2024-08-15 ENCOUNTER — HOSPITAL ENCOUNTER (OUTPATIENT)
Dept: LAB | Facility: MEDICAL CENTER | Age: 84
End: 2024-08-15
Attending: INTERNAL MEDICINE
Payer: COMMERCIAL

## 2024-08-15 VITALS
HEIGHT: 63 IN | TEMPERATURE: 97.3 F | SYSTOLIC BLOOD PRESSURE: 185 MMHG | OXYGEN SATURATION: 95 % | BODY MASS INDEX: 19.34 KG/M2 | RESPIRATION RATE: 18 BRPM | WEIGHT: 109.13 LBS | DIASTOLIC BLOOD PRESSURE: 88 MMHG | HEART RATE: 72 BPM

## 2024-08-15 VITALS
BODY MASS INDEX: 20.35 KG/M2 | OXYGEN SATURATION: 93 % | HEART RATE: 72 BPM | HEIGHT: 61 IN | WEIGHT: 107.8 LBS | DIASTOLIC BLOOD PRESSURE: 66 MMHG | TEMPERATURE: 97.3 F | SYSTOLIC BLOOD PRESSURE: 107 MMHG

## 2024-08-15 DIAGNOSIS — R60.9 SWELLING: ICD-10-CM

## 2024-08-15 DIAGNOSIS — R73.9 ELEVATED BLOOD SUGAR: ICD-10-CM

## 2024-08-15 DIAGNOSIS — M79.89 LEG SWELLING: ICD-10-CM

## 2024-08-15 LAB — D DIMER PPP IA.FEU-MCNC: 0.94 UG/ML (FEU) (ref 0–0.5)

## 2024-08-15 PROCEDURE — 99284 EMERGENCY DEPT VISIT MOD MDM: CPT

## 2024-08-15 PROCEDURE — 36415 COLL VENOUS BLD VENIPUNCTURE: CPT

## 2024-08-15 PROCEDURE — 99214 OFFICE O/P EST MOD 30 MIN: CPT | Mod: GC | Performed by: INTERNAL MEDICINE

## 2024-08-15 PROCEDURE — 3074F SYST BP LT 130 MM HG: CPT | Performed by: INTERNAL MEDICINE

## 2024-08-15 PROCEDURE — 85379 FIBRIN DEGRADATION QUANT: CPT

## 2024-08-15 PROCEDURE — 83036 HEMOGLOBIN GLYCOSYLATED A1C: CPT

## 2024-08-15 PROCEDURE — 1126F AMNT PAIN NOTED NONE PRSNT: CPT | Performed by: INTERNAL MEDICINE

## 2024-08-15 PROCEDURE — 93971 EXTREMITY STUDY: CPT | Mod: RT

## 2024-08-15 PROCEDURE — 3078F DIAST BP <80 MM HG: CPT | Performed by: INTERNAL MEDICINE

## 2024-08-15 ASSESSMENT — PAIN SCALES - GENERAL: PAINLEVEL: NO PAIN

## 2024-08-15 ASSESSMENT — FIBROSIS 4 INDEX
FIB4 SCORE: 1.59
FIB4 SCORE: 1.59

## 2024-08-15 NOTE — PROGRESS NOTES
Subjective     Antonio Treviño is a 81 y.o. female who presents with No chief complaint on file.            HPI   83 year old woman with spinal stenosis s/p laminectomy complicated by periperal neuropathy, also with compression fracture - followed by neuro, osteoporosis on forteo followed by ramon oliver, hx bppv s/p Epley with resolution, improved dry eye in setting of prior eye surgery, L wrist sprain s/p surgery and subsequent fracture, functional murmur (echo confirmed) here for new onset right foot swelling.    Patient reports sudden onset of right foot swelling and pain without trauma several days earlier this week.  She had previously been using compression stockings to try to assist with possible mild venous insufficiency.  She reports the swelling has significantly subsided over the last 2 days, however is residually remaining.  Pain has also significantly improved.    ROS           Objective     VSS    Physical Exam  General: Pleasant woman in no apparent distress, alert, responding appropriately.   Ext: Right dorsum of her foot is slightly swollen compared to the left, perhaps trace edema.  She has mild erythema but is not overtly hot to the touch, no signs of purulent drainage, not tender when I palpate, able to stand and ambulate readily.  No significant swelling of the right chin.                Assessment & Plan     R leg swelling concerning for possible DVT versus superficial thrombophlebitis  -Spontaneous onset of significant swelling without trauma in the setting of wearing compression stockings which could have potentially restricted blood flow is concerning for DVT.  Wells score is somewhere between 1 and 3.      Plan:  Discussed potential options including stat D-dimer, stat ultrasound, or both.  Patient elects to proceed in stepwise fashion with D-dimer given may be difficult to obtain ultrasound quickly and has moderate pretest probability.  Stat D-dimer placed, along with ordering  ultrasound of the right leg.  -Explicitly reviewed alarm precautions including to emergency department immediately if chest pain or shortness of breath  -Stop compression stockings  -Will call with results of ultrasound    Addendum: D-dimer results are resulted, 0.94 thus elevated above her aged adjusted threshold of 0.84.  Called patient and spoke about concern to triage for DVT.  Discussed options of stat US tomorrow vs ED tonight (safer) to drew, pt elects to go to ED at [facility].  Direct signout given to Johanna at [facility], appreciate ED support.    HTN  -bps with home log at goal in ams (<130/80), slightly above goal in PMs, will monitor while addressing above acute issue, not otherwise addressed    Possible prediabetes  Check A1c for monitoring, no otherwise addressed today    From Prior Visit, not addressed:      HTN  -not lightheaded.  bp at goal today.  patient brings home BP log, all readings are at goal <130/80 with the exception of evening readings, however patient realized that she was always taking her evening readings immediately after watching movies which made her excited, if she waited 20 minutes and read a book her blood pressure normalized.    Prev Discussed the possible morbidity/mortality concern related to prior mild Cr elevation per BMJ article.  Cr now 0.68, baseline 0.6-0.65, essentially at baseline.    Plan  -Cont losartan 25mg qday. For now  -cont hydration    -cont diet/exercise/low salt diet  -previously cautioned any chest pain or shortness of breath > 5 minutes, any significant motor weakness, speech issues, or facial droop, cautioned patient to report to the emergency department immediately.    Neck pressure likely 2/2 paraspinal/trapezius muscle spasms and poor posture/biomechanics, now worsening with intermittent radiculopathy  -No alarm symptoms.  Significant neck limitation of motion in addition to tight trapezius musculature along with poor biomechanics  while reading and baseline hunched posture are likely contributing to neck spasms which are for what ever reason exacerbated when she is standing and walking.  Notes resolution when she lies on her back.  Suspect poor posture/biomechanics along with potentially anxiety/stress are contributing to tight muscles.  Tried Home physical therapy for neck mobilization and hot tub which initially improved her symptoms, although have worsened.  XR neck normal.    Plan  -Continue home PT pending establishment with formal PT in July, appreciate support  -Reviewed recommendations on ergonomics and proper posture, including pillow positioning    -alarm precautions reviewed  -Follow-up in 2 months.        Prediabtes  -prev resolved, now fbg 108.  mild  Discussed continued diet/exericse.    Elevated ldl and ASCVD  -recently 177., prior 151.  Prev discussed, patient was not interested in statin at prior annual exam.  Given the LDL is now above 160 and patient has developed the additional risk factor of evening hypertension, and patient's ASCVD is elevated (above 30%, although a significant portion of this is age-related), revisited the possibility of statin initiation.  Patient is amenable.  Recent LFTs normal.  Patient reports was previously on atorvastatin but was stopped due to possible myalgias, will begin rosuvastatin 10 mg once daily, follow-up cholesterol levels in approximately 3 months.  Continue with hypertension treatment and diet/exercise interventions as previously discussed.  Today: LDL 97 on crestor 10, excellent response.  Patient is reporting mild daily cramps that last for approximately 1 minute and resolve with massage in the setting of prior cramping issues, however patient reports these are slightly different.  Discussed the possibility of transitioning to pravastatin, patient prefers to continue on Crestor 10 mg daily.  Counseled any new or worsening cramping to alert me.      Leg pressure likely 2/2 mild  "venous insufficeincy  -Prev: Varicosities on legs and symptoms are consistent with possible mild venous insufficiency.Discussed pathophysiology.    Today: tried compression stockings via lymphedema clinic, did not help and perhaps worsened.  As an aside, reports prior HERMINIA nl.  Overall daily sx but improved with stretching, exercise, massage, leg elevation.  Sx may have some overlap with neuropathy sx from prior back surgery, as states today that sx are going above knees.  Overall sx initially were not bothersome to the patient, but per report are now's gradually worsening and becoming bothersome.    Plan  -Continue leg elevation, healthy physical activity  -Discussed prolonged standing will exacerbate  -Patient is a healthy BMI  -Patient reports is unable to tolerate compression stockings due to her peripheral neuropathy, if she is able to better optimize her peripheral neuropathy perhaps she could tolerate compression stockings which would be helpful.  She reports trialing gabapentin and Lyrica without success (\"brain fog\"), has tried other medications as well.  Referral placed for her to reestablish with her neurologist Dr. Osborn for further management, appreciate support  -Referral to vein center for possible additional suggestions or intervention, did discuss briefly some of the pros and cons related to vein stripping procedures    Bmp to prep for htn f/u, patient reports stable at home, otherwise not addressed    Hx peripheral neuropathy in setting of spinal stenosis  -reports has tried gabapentin, lyrica, other meds without success.  -foot exam benign, recommended lotion to mild dry spots with socks over her feet to prevent slipping if she would like  -referral to re-establish with Dr. Osborn, appreciate support.  Defer further management to Dr. Osborn        Anxiety with panic attacks - jarred screen  -no SI/HI/AH/VH.  Patient reports \"all her life\" she has been anxious.  She notes worrying about many things " "in general, although denies worrying about \"the little things\".  She states that she will begin to ruminate on things and then at times this will trigger periods where she has a fast heart rate, develops a headache, and feels significantly anxious for a period of 1 to 2 hours, where this then subsides.  Denies obsessions or rituals thus OCD unlikely, denies embarrassment around socialization thus social anxiety disorder unlikely, denies concern about \"escaping to safety\" thus agoraphobia unlikely, do need to clarify whether there is an inciting traumatic event.  However, symptoms seem diffuse.  PHQ 2 negative making depression unlikely.  Symptoms are likely consistent with panic attacks with possible generalized anxiety disorder versus elevated anxiety      Plan  -continue diet/exercise  -discussed pathophysiology of anxiety/panic attacks generally, rec cbt resources.  Anxiety resournces hangout given.  -Safety contract reviewed  -Follow-up next visit on CBT resources and also jarred screen  -Patient now trialing [facility] mindful/meditation (reported previous visit, not otherwise addressed)          mild tsh fluctauation due to age, likely no current clinical significacne  -No clear symptoms referable to TSH, denies hot or cold intolerance, palpitations, skin or hair changes, does have chronic intermittent constipation that precedes TSH change and was present when patient had a normal TSH.  No thyromegaly.  -given age >70, TSH < 6.9, no clear symptoms, prior tsh nl, suggested we not treat at present and instead repeat TSH in 2-6 months, patient in agreement.  This is consistent with up-to-date recommendations.  Today: repeat TSH nl, d/w patient.  Follow clinicaly                Chronic dry cough, significantly improved but still present, possibly   secondary to postnasal drip  -Began after starting lisinopril, improved after discontinuing lisinopril but still occuring, patient has no smoking history, no shortness of " breath, and lungs have serially been without wheezing, making asthma or COPD less likely.  Prevoiusly and currently patient denied post nasal drip symptoms.  Did mention history of GERD but no current symptoms of heartburn.    Currently cough occurs 2-3 times per day, not bothering pt.      Plan:   -Discussed patient could trial netti pot 1-2x daily if she would like as this is a very low risk intervention, etiology is not completely clear.  Patient states symptoms are not really bothering her, also reasonable to follow clinically        Ca counseling in the setting of calcium channel blocker  -Patient inquiring whether calcium supplementation could be harmful or her bone health could be affected by calcium channel blocker/amlodipine.  Discussed I am unaware of this being an issue, review of up-to-date adverse reactions does not list osteopenia or porosis as an issue, although osteoarthritis is listed as a very rare possible side effect.  Discussed with patient.  Also reviewed the recommendation of 1200 mg total calcium including diet and supplementation, suggested patient specifically quantitate her dietary intake as she reports eating dairy.      Mild hyponatremia during recent ED visit for HA  -resolved, follow clinicaly.  Discussed lab results with patient.      Healthcare Maintenance (from last visit, not yet covered)    Suggest fit testing if wishes to continue, depression/anxiety/caregiver/ a1c screening/constipation.    Colonoscopy: 2017, GI following and she diann lschedule w/I next year, denies stool changes  HIV: neg 2022  Syphilis: neg 2022  Hep C: neg 2022  A1c: fbs previously 100, a1c 5.6, pt acctively working on diet/exercise.  Check a1c for monioring  ASCVD: prev discussed, pt prefers diet/exercise focus which is reasonable as her elevated ascvd largely age related, also discus coronary artery scoring/ct, pt prefers to observe with diet/exercise at present.  Denies cp/sob with exertion.  Smoking:  never  Alcohol Use: never  Recreational Drugs: never  Healthy Eating/Exercise: discussed veg inclined diet evidence based and exericse, patient is very physically active doing 1 hour walks, aquatherapy/exercise, home PT exercises, praised patients healthy efforts  Depression and Anxiety: neg phq2.  States some anxiety about age and future and what will happen to her  (who has dementia) if she was to pass away. States feeling ok overall though, declcines making visit to discuss further.  Offered to help support the patient at any time such as with coordinating support for care for her .  Immunizations: utd covid, pna, tdap, shingrix, flu shot    Mammogram: nl 2022, repeat 2024 if wishes.  PAP: s/p no non-cancer, prior pap nl, no further monitoring needed  DEXA: osteoporosis on tx per endo, apprecite support  Goals of Care: has advanced directive      Calf muscle cramps, etiology unclear  -Continue stretching and hydration  -Potassium magnesium and calcium all normal, discussed with patient  -Patient has tried taking a multivitamin with B vitamins, and in fact her B6 vitamin level was previously supratherapeutic, she reports no change in Symptomatology with this, as such likely no utility in trialing B vitamin supplementation.  Discussed possibility of using diltiazem, patient politely declines at present, stating that the cramps are only lasting a few minutes when they occur and that overall they are tolerable.  Discussed that we could revisit this if worsens, and that quinidine is another option although has a very significant side effect profile and would not recommend unless cramps are debilitating.  -subsequent visit:Patient reports calf cramping is only intermittently occurring, is doing the stretches daily and feels these are potentially helping.  Not otherwise addressed.  -today:. Reports improved with calf strecthing, hot tub.  Occasionally happenig now, not significantly bothering patient,  follow clinnically      History of functional murmur  -Echo had only mild valvular abnormalities and aortic sclerosis, otherwise normal.  Discussed with patient, continue diet and exercise, no further intervention needed.  Likely flow murmur.    Mild leukopenia, etiology unclear  -Patient previously denied fevers, chills, night sweats, well-appearing, weight is stable in EMR.  Was previously slightly elevated at 13.2, now 4.3.  since drawing labs as above, will repeat cbc with manual diff to trend wbc.  -repeta cbc nl, follow clinically.        ASCVD discussion, began and partially discussed.  -LDL is 151, given that the patient is not diabetic, has no history of cardiovascular disease, is a non-smoker, and is not hypertensive, her elevated ASCVD would be mostly age-related at this point.  Discussed with patient that overall healthy lifestyle interventions would be the most effective and appropriate thing to reduce her risk of cardiovascular disease, patient in agreement with this plan.  At follow-up can complete this discussion but discussing the age component of the ASCVD elevation and her preference to treat this versus not with statin therapy.    Constipation, improved  -Not fully addressed today.  Patient reports daily bowel movements although they are somewhat hard, states that she is eating increased fiber and using MiraLAX as needed.  Offered to discuss further at future visit, patient states she is fine with monitoring for now.     Spinal stenosis - appreciate neuro support, encourage patient to follow-up with them regarding any future management of her gabapentin or symptoms.    Endocrine referral placed per patient request for injectible osteoporosis therapy to re-establish, apprecirae support.  Counseled that need for repeating DEXA scans on medical therapy for osteoporosis is not clear, probably would not benefit with repeat before 5 years, but will defer to endocrine given they are the primary team  managing her Forteo injections.  Also noted that pth ordered by her previous endocrinologist was low, but ca and vit d nl.  Defer to endocrine for further mgmt.    Counseled patient that if she is eating a well totally well-balanced/healthy diet that she does not need to supplement with vitamins, and that they are not FDA approved so could contain contaminants.    Return to clinic 2 weeks for leg swelling and elevated ascvd/elvated ldl. Then f/u HTN Then anxiety in 1 mo (or after returns from trip duirng September if no DVT). . leg cramps/venous insufficiency., Then annual exam/diet.

## 2024-08-16 LAB
EST. AVERAGE GLUCOSE BLD GHB EST-MCNC: 126 MG/DL
HBA1C MFR BLD: 6 % (ref 4–5.6)

## 2024-08-16 NOTE — ED TRIAGE NOTES
Chief Complaint   Patient presents with    Leg Swelling     R leg x approx 2 days per pt. Reports concern for DVT. Denies CP, SOB, wound, rash.     Physical Exam  Pulmonary:      Effort: Pulmonary effort is normal.   Skin:     General: Skin is warm and dry.   Neurological:      Mental Status: She is alert.

## 2024-08-16 NOTE — ED NOTES
"Chief Complaint   Patient presents with    Leg Swelling     R leg x approx 2 days per pt. Reports concern for DVT. Denies CP, SOB, wound, rash.     BP (!) 185/88   Pulse 72   Temp 36.3 °C (97.3 °F) (Temporal)   Resp 18   Ht 1.6 m (5' 3\")   Wt 49.5 kg (109 lb 2 oz)   SpO2 95%   BMI 19.33 kg/m²     To room 7b. To monitor, nibp and cont pulse ox.  "

## 2024-08-16 NOTE — ED PROVIDER NOTES
ED Provider Note    CHIEF COMPLAINT  Chief Complaint   Patient presents with    Leg Swelling     R leg x approx 2 days per pt. Reports concern for DVT. Denies CP, SOB, wound, rash.       EXTERNAL RECORDS REVIEWED  Outpatient Notes seen 8/15/2024 for right leg swelling and [facility], D-dimer positive, 0.94, which is above age-adjusted threshold.  Presenting here via transfer for evaluation.    HPI/ROS  LIMITATION TO HISTORY   Select: : None      Antonio Treviño is a 83 y.o. female who presents with right foot pain, erythema, that started atraumatically 2 days ago and resolved after relief.  She was seen at her primary, who sent a D-dimer out of concern for potential DVT, which was elevated above age-adjusted threshold.  Thus she is transferred here for emergent DVT ultrasound.  She denies any chest pain or shortness of breath.  Denies any black tarry stools.  She has not been on anticoagulation previously.  Denies any traumatic injury.    PAST MEDICAL HISTORY   has a past medical history of Anesthesia, Bowel habit changes, Cataract, Dry eyes, Heart murmur, High cholesterol (11/02/2020), Hyperlipidemia, Left ovarian cyst (11/11/2020), Osteopenia, OSTEOPOROSIS, Thickened endometrium (11/11/2020), and Vitiligo.    SURGICAL HISTORY   has a past surgical history that includes cataract phaco with iol (7/2/2013); tendon transfer; other (2020); hysterectomy laparoscopy (11/11/2020); salpingo oophorectomy (Bilateral, 11/11/2020); and cystoscopy (11/11/2020).    FAMILY HISTORY  No family history on file.    SOCIAL HISTORY  Social History     Tobacco Use    Smoking status: Never    Smokeless tobacco: Never   Vaping Use    Vaping status: Never Used   Substance and Sexual Activity    Alcohol use: No    Drug use: No    Sexual activity: Not on file       CURRENT MEDICATIONS  Home Medications       Reviewed by Laura Morillo R.N. (Registered Nurse) on 08/15/24 at 5588  Med List Status: Not Addressed     Medication  "Last Dose Status   Calcium Carbonate 1500 (600 Ca) MG Tab  Active   Cholecalciferol (VITAMIN D HIGH POTENCY PO)  Active   Diosmin Powder  Active   gabapentin (NEURONTIN) 100 MG Cap  Active   losartan (COZAAR) 25 MG Tab  Active   rosuvastatin (CRESTOR) 10 MG Tab  Active                    ALLERGIES  Allergies   Allergen Reactions    Codeine      Nausea and vommiting  Other reaction(s): nausea and vomiting    Morphine      Nausea amd vomitting  Other reaction(s): nausea and vomiting       PHYSICAL EXAM  VITAL SIGNS: BP (!) 185/88   Pulse 72   Temp 36.3 °C (97.3 °F) (Temporal)   Resp 18   Ht 1.6 m (5' 3\")   Wt 49.5 kg (109 lb 2 oz)   SpO2 95%   BMI 19.33 kg/m²    General: Pleasant alert female, no respiratory stress distress nontoxic-appearing.  Head: Normocephalic atraumatic  Eyes: Extraocular motion intact  Neck: Supple, no rigidity  Cardiovascular: Regular rate and rhythm no murmurs rubs or gallops  Respiratory: Clear to auscultation bilaterally, equal chest rise and fall, no increased work of breathing  Abdomen: Soft nontender no guarding  Musculoskeletal: Warm and well perfused, no peripheral edema no tenderness of the right foot.  2+ DP PT pulses.  No tenderness of the calves bilaterally.  Neuro: Alert, no focal deficits  Integumentary: No wounds or rashes    RADIOLOGY/PROCEDURES     Radiologist interpretation:  US-EXTREMITY VENOUS LOWER UNILAT RIGHT   Final Result         1. No evidence of right lower extremity deep venous thrombosis.          COURSE & MEDICAL DECISION MAKING    ASSESSMENT, COURSE AND PLAN  Care Narrative: 83-year-old female presenting with atraumatic right foot pain and swelling, resolved after NSAIDs, seen by PCP today, sent a D-dimer which was elevated, sent here for DVT rule out.  Given her symptoms have resolved, I feel this is reassuring against and other pathology such as gout, fracture, cellulitis.  Agree with DVT ultrasound will obtain this.    DVT ultrasound thankfully negative, " given symptoms of resolved, no systemic symptoms of infection,  Defer antibiotics for potential cellulitis, did discuss potential for gout if symptoms recur.    Reviewed patient's laboratory, imaging results at the bedside, patient is agreeable to discharge, we discussed strict return precautions, and outpatient follow-up, patient was discharged in no acute distress.  All questions were answered prior to discharge.      DISPOSITION AND DISCUSSIONS    Escalation of care considered, and ultimately not performed:Laboratory analysis consider checking renal function for prescribing anticoagulation however given ultrasounds negative this was deferred    FINAL DIAGNOSIS  1. Leg swelling         Electronically signed by: Mich Denney M.D., 8/15/2024 10:47 PM

## 2024-08-19 ENCOUNTER — APPOINTMENT (OUTPATIENT)
Dept: RADIOLOGY | Facility: MEDICAL CENTER | Age: 84
End: 2024-08-19
Attending: INTERNAL MEDICINE
Payer: COMMERCIAL

## 2024-08-20 ENCOUNTER — TELEPHONE (OUTPATIENT)
Dept: INTERNAL MEDICINE | Facility: OTHER | Age: 84
End: 2024-08-20
Payer: COMMERCIAL

## 2024-08-20 NOTE — TELEPHONE ENCOUNTER
----- Message from Medical Assistant Elly BRIGGS Med Ass't sent at 8/16/2024  4:13 PM PDT -----  Hi Dr. Stewart,    I'm not sure if I'm correct, but it looks like this pt went to the ER this weekend and the DVT was conducted there. Is that correct? If not, we can work on this on Monday.    Thanks,  Luis Daniel Hampton Ass't  ----- Message -----  From: Bryce Stewart D.O.  Sent: 8/15/2024   9:08 PM PDT  To: Cara Joyner Med Ass't    Hi Nicky,    Ms. Treviño has new onset right leg swelling and a positive D-dimer, we need to evaluate for a DVT in her leg immediately.I had put in the ultrasound of her right leg as routine, this needs to be changed to stat and we need to see if we can get her in ASAP, ideally today (Friday) before the weekend.  Can we please work on this first thing in the morning?    Best,    Bryce

## 2024-08-28 ENCOUNTER — OFFICE VISIT (OUTPATIENT)
Dept: INTERNAL MEDICINE | Facility: OTHER | Age: 84
End: 2024-08-28
Payer: COMMERCIAL

## 2024-08-28 VITALS
BODY MASS INDEX: 20.32 KG/M2 | OXYGEN SATURATION: 94 % | TEMPERATURE: 97.4 F | HEIGHT: 61 IN | HEART RATE: 72 BPM | WEIGHT: 107.6 LBS | DIASTOLIC BLOOD PRESSURE: 67 MMHG | SYSTOLIC BLOOD PRESSURE: 122 MMHG

## 2024-08-28 DIAGNOSIS — E78.5 HYPERLIPIDEMIA, UNSPECIFIED HYPERLIPIDEMIA TYPE: ICD-10-CM

## 2024-08-28 DIAGNOSIS — M79.89 RIGHT LEG SWELLING: ICD-10-CM

## 2024-08-28 DIAGNOSIS — I87.2 VENOUS INSUFFICIENCY OF BOTH LOWER EXTREMITIES: ICD-10-CM

## 2024-08-28 ASSESSMENT — ENCOUNTER SYMPTOMS
SPUTUM PRODUCTION: 0
DEPRESSION: 0
HEADACHES: 0
NECK PAIN: 0
SHORTNESS OF BREATH: 0
FEVER: 0
PALPITATIONS: 0
BLURRED VISION: 0
VOMITING: 0
MYALGIAS: 0
WHEEZING: 0
SORE THROAT: 0
DOUBLE VISION: 0
COUGH: 0
DIZZINESS: 0
CHILLS: 0
ABDOMINAL PAIN: 0
STRIDOR: 0
DIARRHEA: 0

## 2024-08-28 ASSESSMENT — FIBROSIS 4 INDEX: FIB4 SCORE: 1.59

## 2024-08-28 ASSESSMENT — PAIN SCALES - GENERAL: PAINLEVEL: NO PAIN

## 2024-08-28 NOTE — PROGRESS NOTES
Teaching Physician Attestation      Level of Participation    I have personally interviewed and examined the patient.  In addition, I discussed with the resident physician the patient's history, exam, assessment and plan in detail.  Topics listed in my addendum were the focus of the visit.  Healthcare maintenance was not addressed this visit unless listed as a topic in my addendum.  I agree with the plan as written along with the following additions/modifications:    R foot swelling, etio unclear, resolved  -Patient presents for follow-up of spontaneous onset right foot swelling.  D-dimer was elevated, patient presented to the emergency department where fortunately ultrasound was negative for DVT, patient was discharged home.  She reports complete resolution of swelling at this point, and on exam she has no lower extremity edema.  Denies chest pain or shortness of breath.  Unclear reason for why D-dimer was elevated    Plan  -Discussed the potential for repeating ultrasound to ensure no blood clot, but given swelling has completely resolved, is asymptomatic, and had recent negative ultrasound, decided to monitor at present for now.  -Advised to continue to not utilize compression stockings as patient states swelling occurred after using and question if related to the fit being too tight thus obstructing blood flow  -Follow-up with vascular November, defer further management to them, appreciate support    Elevated ldl and ASCVD in setting of prediabetes  -ldl recently 177., prior 151, . .  Recent LFTs normal.  Patient reports was previously on atorvastatin but was stopped due to possible myalgias, will begin rosuvastatin 10 mg once daily, follow-up cholesterol levels in approximately 3 months.  Continue with hypertension treatment and diet/exercise interventions as previously discussed.  LDL 97 on crestor 10, excellent response.  Patient is reporting mild daily cramps that last for approximately 1 minute and resolve  with massage in the setting of prior cramping issues, however patient reports these are slightly different.  Discussed the possibility of transitioning to pravastatin, patient prefers to continue on Crestor 10 mg daily.  Counseled any new or worsening cramping to alert me.  Today: Patient has hovered around prediabetes diagnosis, most recent is 6.0.  -Continue Crestor 10 mg daily, diet (patient has healthy BMI), linus chi, will follow-up A1c screening and lipids at either next visit or annual exam    Bp at goal here, not otherwise addressed today.    Rtc 2 mo.. f/u HTN, anxiety, Then annual exam/diet.

## 2024-08-28 NOTE — PATIENT INSTRUCTIONS
Please follow up with Vascular medicine  Thank you for visiting me today at the [facility].   Please follow up in 3 months.

## 2024-08-28 NOTE — PROGRESS NOTES
"Chief Complaint   Patient presents with    Follow-Up    Results     Dvt rule out       HISTORY OF PRESENT ILLNESS: Patient is a 83 y.o. female established patient who presents today for the following.      She presents for follow-up regarding her recent right foot swelling.  She was recently seen , Presents for follow-up he is here follow-up in primary care clinic and was found to have a isolated right swollen foot with right swollen leg, she was found to have elevated D-dimer and was referred to the ED where she underwent a ultrasound which was negative for DVT.  She states her right foot swelling has completely resolved at this point, she denies any chest pain or shortness of breath.  He was recently seen by Dr. Shabazz in July for lower extremity venous stasis, she was advised to wear compression stockings which she has stopped since her ED visit as she thinks this is worsening her lower extremity edema.  She was also given a trial of diosmin she has been compliant with.  States she still has some mild edema at the end of the day states she has follow-up with vascular medicine on November 8.  Also discussed today patient's hyperlipidemia at this visit, she has been compliant with their rosuvastatin no complaints at this time, she did have a history of lower extremity cramping.    Past Medical History:   Diagnosis Date    Anesthesia     nausea   \"no narcotics\"    Bowel habit changes     constipation    Cataract     bilateral IOL    Dry eyes     Heart murmur     High cholesterol 11/02/2020    no medication    Hyperlipidemia     Left ovarian cyst 11/11/2020    Osteopenia     OSTEOPOROSIS     Thickened endometrium 11/11/2020    Vitiligo        Patient Active Problem List    Diagnosis Date Noted    Chronic venous insufficiency 07/03/2024    Varicose veins of leg with swelling, bilateral 07/03/2024    Age related osteoporosis 06/10/2023    Primary osteoarthritis of first carpometacarpal joint of left hand 09/14/2022 "    Carpal tunnel syndrome of left wrist 09/14/2022    Ulnar impaction syndrome, left 09/14/2022    Plantar fasciitis 09/09/2020    Lumbar spinal stenosis 12/11/2019    Peripheral neuropathy 09/05/2019    Closed compression fracture of third lumbar vertebra (HCC) 10/07/2016    Benign paroxysmal positional vertigo due to bilateral vestibular disorder 10/07/2016    Constipation 11/13/2015    Lumbar radiculopathy, right 08/07/2015    Vitiligo 08/07/2015    Dystrophia unguium 08/07/2015    Systolic murmur 04/29/2013       Allergies: Codeine and Morphine    Current Outpatient Medications   Medication Sig Dispense Refill    Diosmin Powder Take 1,000 mg by mouth 2 times a day. To improve vein function      losartan (COZAAR) 25 MG Tab TAKE ONE TABLET BY MOUTH ONE TIME DAILY 90 Tablet 1    rosuvastatin (CRESTOR) 10 MG Tab TAKE ONE TABLET BY MOUTH IN THE EVENING 90 Tablet 1    Calcium Carbonate 1500 (600 Ca) MG Tab CALCIUM CARBONATE 600 MG TABS      Cholecalciferol (VITAMIN D HIGH POTENCY PO) Take 6,000 Units by mouth every day.      gabapentin (NEURONTIN) 100 MG Cap Take 1 Capsule by mouth 2 times a day. (Patient not taking: Reported on 8/15/2024) 60 Capsule 11     No current facility-administered medications for this visit.       Social History     Tobacco Use    Smoking status: Never    Smokeless tobacco: Never   Vaping Use    Vaping status: Never Used   Substance Use Topics    Alcohol use: No    Drug use: No       No family history on file.      Review of Systems   Review of Systems   Constitutional:  Negative for chills and fever.   HENT:  Negative for sore throat.    Eyes:  Negative for blurred vision and double vision.   Respiratory:  Negative for cough, sputum production, shortness of breath, wheezing and stridor.    Cardiovascular:  Positive for leg swelling (Mild at the end of the day.). Negative for chest pain and palpitations.   Gastrointestinal:  Negative for abdominal pain, diarrhea and vomiting.   Genitourinary:  " Negative for dysuria and urgency.   Musculoskeletal:  Negative for myalgias and neck pain.   Neurological:  Negative for dizziness and headaches.   Psychiatric/Behavioral:  Negative for depression.        Exam:  /67 (BP Location: Right arm, Patient Position: Sitting, BP Cuff Size: Adult)   Pulse 72   Temp 36.3 °C (97.4 °F) (Temporal)   Ht 1.549 m (5' 0.98\")   Wt 48.8 kg (107 lb 9.6 oz)   SpO2 94%  Body mass index is 20.34 kg/m².    Constitutional:  Not in acute distress, well appearing.  HEENT:   NC/AT  Cardiovascular: Regular rate and rhythm. No murmurs or gallops.      Lungs:   Clear to auscultation bilaterally. No wheezes or crackles. No respiratory distress.  Abdomen: Not distended, soft, not tender. No guarding or rigidity. No masses.  Extremities:  No cyanosis/clubbing/edema.  Varicose veins present on right foot   Skin:  Warm and dry.  No visible rashes.  Neurologic: Alert & oriented x 3, CN II-XII grossly intact, strength and sensation grossly intact.  No focal deficits noted.  Psychiatric:  Affect normal, mood normal, judgment normal.    Assessment/Plan:     1. Right leg swelling  Unclear etiology, she had a recent right lower extremity ultrasound which was negative for DVT.  She did have a recently elevated D-dimer of unknown etiology.  Her swelling at this point is completely resolved  Advised to avoid using compression stockings as patient states this worsened her swelling  Follow-up with vascular medicine    2. Venous insufficiency of both lower extremities  She has been seen by Dr. Shabazz recently for evaluation of chronic venous insufficiency and has been prescribed  diosmin 1g he has been compliant with.  Advised to follow-up with vascular medicine, and avoid compression stockings at this time.    3. Hyperlipidemia, unspecified hyperlipidemia type  LDL is well-controlled, recently in April 2024 it was 97  Continue Crestor 10 mg daily, continue diet and linus chi.  Follow-up with primary " care annual visit regarding A1c screening and lipids.      All imaging results and lab results and consult notes are reviewed at this visit.  Followup: Return in about 3 months (around 11/28/2024).    Please note that this dictation was created using voice recognition software. I have made every reasonable attempt to correct obvious errors, but I expect that there are errors of grammar and possibly content that I did not discover before finalizing the note.    Amanda Pak, DO  PGY-2  Internal Medicine

## 2024-11-01 ENCOUNTER — HOSPITAL ENCOUNTER (OUTPATIENT)
Dept: LAB | Facility: MEDICAL CENTER | Age: 84
End: 2024-11-01
Attending: INTERNAL MEDICINE
Payer: COMMERCIAL

## 2024-11-01 LAB
25(OH)D3 SERPL-MCNC: 50 NG/ML (ref 30–100)
CA-I SERPL-SCNC: 1.25 MMOL/L (ref 1.1–1.3)
CALCIUM SERPL-MCNC: 9.9 MG/DL (ref 8.4–10.2)
PTH-INTACT SERPL-MCNC: 19.5 PG/ML (ref 14–72)

## 2024-11-01 PROCEDURE — 83970 ASSAY OF PARATHORMONE: CPT

## 2024-11-01 PROCEDURE — 82306 VITAMIN D 25 HYDROXY: CPT

## 2024-11-01 PROCEDURE — 36415 COLL VENOUS BLD VENIPUNCTURE: CPT

## 2024-11-01 PROCEDURE — 82310 ASSAY OF CALCIUM: CPT

## 2024-11-01 PROCEDURE — 82330 ASSAY OF CALCIUM: CPT

## 2024-11-08 ENCOUNTER — OFFICE VISIT (OUTPATIENT)
Dept: VASCULAR LAB | Facility: MEDICAL CENTER | Age: 84
End: 2024-11-08
Payer: COMMERCIAL

## 2024-11-08 VITALS
DIASTOLIC BLOOD PRESSURE: 59 MMHG | SYSTOLIC BLOOD PRESSURE: 101 MMHG | HEART RATE: 74 BPM | WEIGHT: 105 LBS | BODY MASS INDEX: 18.61 KG/M2 | HEIGHT: 63 IN

## 2024-11-08 DIAGNOSIS — I87.2 CHRONIC VENOUS INSUFFICIENCY: ICD-10-CM

## 2024-11-08 DIAGNOSIS — I83.813 VARICOSE VEINS OF BILATERAL LOWER EXTREMITIES WITH PAIN: ICD-10-CM

## 2024-11-08 PROCEDURE — 3074F SYST BP LT 130 MM HG: CPT

## 2024-11-08 PROCEDURE — 99212 OFFICE O/P EST SF 10 MIN: CPT

## 2024-11-08 PROCEDURE — 3078F DIAST BP <80 MM HG: CPT

## 2024-11-08 PROCEDURE — 99214 OFFICE O/P EST MOD 30 MIN: CPT

## 2024-11-08 ASSESSMENT — FIBROSIS 4 INDEX: FIB4 SCORE: 1.59

## 2024-11-08 ASSESSMENT — ENCOUNTER SYMPTOMS
WHEEZING: 0
CHILLS: 0
PALPITATIONS: 0
FEVER: 0
SHORTNESS OF BREATH: 0
HEMOPTYSIS: 0
CLAUDICATION: 0
ORTHOPNEA: 0
PND: 0
SPUTUM PRODUCTION: 0
COUGH: 0

## 2024-11-08 NOTE — PROGRESS NOTES
VASCULAR MEDICINE CLINIC - Follow Up  VISIT  11/8/2024     Antonio Treviño is a 83 y.o. female  who has been referred for vascular medicine evaluation and management of chronic venous disease/insufficiency, est 7/2024   Referring provider: Bryce Stewart D.O.     Subjective      Chronic venous disease:  Doing well,   Has started diosmin, thinks is helping with pain in legs  No real change in leg swelling, better in am, and worse in evenings  Still unable to wear compression stockings due to neuropathy  Has never tried wraps  Had pain, redness and swelling in right leg/foot, went to ER for concern for DVT, imaging was negative and swelling/redness resolved within a day or so  Denies CP, SOB, palpitations  No TIA/CVA symptoms      INITIAL VISIT HISTORY: seen by PCP 6/13/24 for ongoing chronic sx d/t varicosities.  Noting expanding varicosities, swelling that is worsening  Age of onset: 78  Hx of known VTE:  none reported   Prior imaging or work-up: BLE VR duplex 2020 showing LLE superficial VR  Prior interventions:     Compression stockings: unable to tolerated due to periph neuropathy   Medications/venoactive supplements: none    Procedural interventions:  lymphedema MLT - no lymphedema identified      Patient Active Problem List    Diagnosis Date Noted    Chronic venous insufficiency 07/03/2024    Varicose veins of leg with swelling, bilateral 07/03/2024    Age related osteoporosis 06/10/2023    Primary osteoarthritis of first carpometacarpal joint of left hand 09/14/2022    Carpal tunnel syndrome of left wrist 09/14/2022    Ulnar impaction syndrome, left 09/14/2022    Plantar fasciitis 09/09/2020    Lumbar spinal stenosis 12/11/2019    Peripheral neuropathy 09/05/2019    Closed compression fracture of third lumbar vertebra (HCC) 10/07/2016    Benign paroxysmal positional vertigo due to bilateral vestibular disorder 10/07/2016    Constipation 11/13/2015    Lumbar radiculopathy, right 08/07/2015    Vitiligo  08/07/2015    Dystrophia unguium 08/07/2015    Systolic murmur 04/29/2013      Past Surgical History:   Procedure Laterality Date    HYSTERECTOMY LAPAROSCOPY  11/11/2020    Procedure: HYSTERECTOMY, LAPAROSCOPIC-TOTAL;  Surgeon: Max Villatoro M.D.;  Location: SURGERY SAME DAY [facility];  Service: Gynecology    SALPINGO OOPHORECTOMY Bilateral 11/11/2020    Procedure: SALPINGO-OOPHORECTOMY;  Surgeon: Max Villatoro M.D.;  Location: SURGERY SAME DAY [facility];  Service: Gynecology    CYSTOSCOPY  11/11/2020    Procedure: CYSTOSCOPY-DIAGNOSTIC;  Surgeon: Max Villatoro M.D.;  Location: SURGERY SAME DAY [facility];  Service: Gynecology    OTHER  2020    laminectomy    CATARACT PHACO WITH IOL  7/2/2013    Performed by Bin Cardona M.D. at [facility]    TENDON TRANSFER        No family history on file.   Current Outpatient Medications on File Prior to Visit   Medication Sig Dispense Refill    Diosmin Powder Take 1,000 mg by mouth 2 times a day. To improve vein function      gabapentin (NEURONTIN) 100 MG Cap Take 1 Capsule by mouth 2 times a day. (Patient not taking: Reported on 11/8/2024) 60 Capsule 11    losartan (COZAAR) 25 MG Tab TAKE ONE TABLET BY MOUTH ONE TIME DAILY 90 Tablet 1    rosuvastatin (CRESTOR) 10 MG Tab TAKE ONE TABLET BY MOUTH IN THE EVENING 90 Tablet 1    Calcium Carbonate 1500 (600 Ca) MG Tab CALCIUM CARBONATE 600 MG TABS      Cholecalciferol (VITAMIN D HIGH POTENCY PO) Take 6,000 Units by mouth every day.       No current facility-administered medications on file prior to visit.      Allergies   Allergen Reactions    Codeine      Nausea and vommiting  Other reaction(s): nausea and vomiting    Morphine      Nausea amd vomitting  Other reaction(s): nausea and vomiting        Social History     Tobacco Use    Smoking status: Never    Smokeless tobacco: Never   Vaping Use    Vaping status: Never Used   Substance Use Topics    Alcohol use: No    Drug use: No     DIET AND  "EXERCISE:  Weight Change:stable   Diet: common adult  Exercise: moderate regular exercise program     Review of Systems   Constitutional:  Negative for chills, fever and malaise/fatigue.   Respiratory:  Negative for cough, hemoptysis, sputum production, shortness of breath and wheezing.    Cardiovascular:  Positive for leg swelling. Negative for chest pain, palpitations, orthopnea, claudication and PND.          Objective    Vitals:    11/08/24 0926   BP: 101/59   BP Location: Left arm   Patient Position: Sitting   BP Cuff Size: Small adult   Pulse: 74   Weight: 47.6 kg (105 lb)   Height: 1.6 m (5' 3\")      BP Readings from Last 4 Encounters:   11/08/24 101/59   08/28/24 122/67   08/15/24 (!) 185/88   08/15/24 107/66      Body mass index is 18.6 kg/m².   Wt Readings from Last 4 Encounters:   11/08/24 47.6 kg (105 lb)   08/28/24 48.8 kg (107 lb 9.6 oz)   08/15/24 49.5 kg (109 lb 2 oz)   08/15/24 48.9 kg (107 lb 12.8 oz)      Physical Exam  Constitutional:       General: She is not in acute distress.     Appearance: Normal appearance. She is not diaphoretic.   HENT:      Head: Normocephalic and atraumatic.   Eyes:      Conjunctiva/sclera: Conjunctivae normal.   Cardiovascular:      Rate and Rhythm: Normal rate and regular rhythm.      Heart sounds: Normal heart sounds.      Comments: Stemmer's negative bilat   No abd wall varicosities     Spider telangectasia:  Scattered bilat   Varicosities:   LLE - GSV tortuous, non-tender                                 Ant acc SV as well   Corona phlebectatica:      RLE:  yes      LLE:  yes    Cording:         RLE:  None     LLE: None     No stasis dermatitis or pigmentation  No lipodermatosclerosis or atrophe clayton  No healed or current VLUs    Pulmonary:      Effort: Pulmonary effort is normal.      Breath sounds: Normal breath sounds.   Musculoskeletal:      Right lower leg: No edema.      Left lower leg: No edema.   Skin:     General: Skin is warm and dry.   Neurological:    " "  Mental Status: She is alert and oriented to person, place, and time.      Cranial Nerves: No cranial nerve deficit.      Gait: Gait is intact.   Psychiatric:         Mood and Affect: Mood and affect normal.          DATA REVIEW    Lab Results   Component Value Date/Time    CHOLSTRLTOT 172 04/04/2024 06:55 AM    LDL 97 04/04/2024 06:55 AM    HDL 60 04/04/2024 06:55 AM    TRIGLYCERIDE 77 04/04/2024 06:55 AM       No results found for: \"LIPOPROTA\"   No results found for: \"APOB\"   No results found for: \"CRPHIGHSEN\"    Lab Results   Component Value Date/Time    SODIUM 137 08/05/2024 07:38 AM    POTASSIUM 4.4 08/05/2024 07:38 AM    CHLORIDE 102 08/05/2024 07:38 AM    CO2 26 08/05/2024 07:38 AM    GLUCOSE 108 (H) 08/05/2024 07:38 AM    BUN 19 08/05/2024 07:38 AM    CREATININE 0.68 08/05/2024 07:38 AM    CREATININE 0.80 04/22/2013 09:30 AM    BUNCREATRAT 19 09/25/2018 07:23 AM    BUNCREATRAT 16 04/22/2013 09:30 AM     Lab Results   Component Value Date/Time    ALKPHOSPHAT 78 11/20/2023 07:20 PM    ASTSGOT 21 11/20/2023 07:20 PM    ALTSGPT 15 11/20/2023 07:20 PM    TBILIRUBIN 0.3 11/20/2023 07:20 PM       Lab Results   Component Value Date/Time    HBA1C 6.0 (H) 08/15/2024 11:43 AM       No results found for: \"MICROALBCALC\", \"MALBCRT\", \"MALBEXCR\", \"CUUVUU79\", \"MICROALBUR\", \"MICRALB\", \"UMICROALBUM\", \"MICROALBTIM\"      Cardiovascular Imaging:    HERMINIA 2020                  RIGHT      Waveform            Systolic BPs (mmHg)                              140           Brachial   Triphasic                                Common Femoral   Triphasic                  168           Posterior Tibial   Triphasic                  168           Dorsalis Pedis                                            Peroneal                              1.20          HERMINIA                           LEFT   Waveform        Systolic BPs (mmHg)                              138           Brachial   Triphasic                                Common Femoral   " Triphasic                  181           Posterior Tibial   Triphasic                  182           Dorsalis Pedis                                            Peroneal                              1.30          HERMINIA    BLE VR duplex 7/2020   Lower extremity veins are patent bilaterally, measurements as given above.   No venous reflux demonstrated in the RIGHT lower extremity.   Reflux demonstrated in the LEFT greater saphenous vein.   Lesser saphenous veins are not visualized either side.    Echo 10/2022  No prior study is available for comparison.   Normal left ventricular systolic function.  The left ventricular ejection fraction is visually estimated to be 55%.  Mild aortic insufficiency.  Aortic valve sclerosis without significant stenosis.  Mild aortic insufficiency.  Estimated right ventricular systolic pressure is 30 mmHg.  Mild pulmonic insufficiency.            Medical Decision Making:  Today's Assessment / Status / Plan:     1. Chronic venous insufficiency        2. Varicose veins of bilateral lower extremities with pain             Etiology of Established CVD if Present:     1) CHRONIC VENOUS DISEASE / INSUFFICIENCY   LLE CEAP classification: C3S / Ep / As / Pr  RLE CEAP classification: C2S / Ep / As / Pn  Venous clinical severity score:  R = 3 / L = 6  - reviewed anatomy, pathophys and gave educ handouts regarding CVI, common s/s, possible complications, and tx options   Unable to tolerate compression stockings due to neuropathy.  Recommend pt consider trialing leg wraps to help with ongoing symptoms, reviewed options on website.  Again declines referral to [facility] to evaluate for reflux/intervention.  Some improvement in pain with Diosmin use  Plan:  - again declines ref to [facility] - consider if not improving with conservative care  - consider repeat VR duplex in future  - start/continue knee-high compression socks, 10-15mmHg or compression wraps and use as much as tolerated  - reviewed compression  "wraps on line, suggest pt trial use to determine if improvement.    - increase walking, avoid prolonged standing   - elevated legs while sitting and sleeping above heart level  - reduce sodium (\"salt\") in diet to less than 2,000mg daily   - continue daily moisturizing lotion (such as [employer] Diabetic Foot Cream)  - defer further management to PCP, prn vasc med  Medications:    - continue diosmin 1g BID OTC     ANTITHROMBOTIC THERAPY   Not indicated     LIFESTYLE INTERVENTIONS  TOBACCO: continued complete avoidance of all tobacco products   PHYSICAL ACTIVITY: >150min/week of mod-intensity activity or as much as tolerated  NUTRITION: Mediterranean and reduce Na to 1500mg/day   ETOH: complete abstinence recommended  WT MGMT: maintain healthy weight       OTHER:  none     Studies to Be Obtained: none   Labs to Be Obtained: none    Follow up in: defer further management to PCP, prn vasc med    VIDYA Borden   [facility]  [facility] [place]  [phone]    Cc:    "

## 2024-12-05 ENCOUNTER — APPOINTMENT (OUTPATIENT)
Dept: NEUROLOGY | Facility: MEDICAL CENTER | Age: 84
End: 2024-12-05
Attending: SPECIALIST
Payer: COMMERCIAL

## 2024-12-09 RX ORDER — LOSARTAN POTASSIUM 25 MG/1
25 TABLET ORAL DAILY
Qty: 90 TABLET | Refills: 3 | Status: SHIPPED | OUTPATIENT
Start: 2024-12-09

## 2024-12-09 NOTE — TELEPHONE ENCOUNTER
Received request via: Pharmacy    Was the patient seen in the last year in this department? Yes    Does the patient have an active prescription (recently filled or refills available) for medication(s) requested? No    [facility]    Does the patient have [facility] and need 100-day supply? (This applies to ALL medications) Patient does not have SCP

## 2025-01-02 ENCOUNTER — APPOINTMENT (OUTPATIENT)
Dept: INTERNAL MEDICINE | Facility: OTHER | Age: 85
End: 2025-01-02
Payer: COMMERCIAL

## 2025-01-02 ENCOUNTER — TELEPHONE (OUTPATIENT)
Dept: INTERNAL MEDICINE | Facility: OTHER | Age: 85
End: 2025-01-02

## 2025-01-02 VITALS
HEART RATE: 67 BPM | OXYGEN SATURATION: 98 % | TEMPERATURE: 97.7 F | BODY MASS INDEX: 20.35 KG/M2 | SYSTOLIC BLOOD PRESSURE: 114 MMHG | DIASTOLIC BLOOD PRESSURE: 68 MMHG | HEIGHT: 62 IN | WEIGHT: 110.6 LBS

## 2025-01-02 DIAGNOSIS — E78.5 HYPERLIPIDEMIA, UNSPECIFIED HYPERLIPIDEMIA TYPE: ICD-10-CM

## 2025-01-02 DIAGNOSIS — I10 HYPERTENSION, UNSPECIFIED TYPE: ICD-10-CM

## 2025-01-02 DIAGNOSIS — G44.209 TENSION HEADACHE: ICD-10-CM

## 2025-01-02 PROCEDURE — 3074F SYST BP LT 130 MM HG: CPT | Performed by: INTERNAL MEDICINE

## 2025-01-02 PROCEDURE — 3078F DIAST BP <80 MM HG: CPT | Performed by: INTERNAL MEDICINE

## 2025-01-02 PROCEDURE — 99214 OFFICE O/P EST MOD 30 MIN: CPT | Mod: GC | Performed by: INTERNAL MEDICINE

## 2025-01-02 RX ORDER — LOSARTAN POTASSIUM 50 MG/1
50 TABLET ORAL DAILY
Qty: 90 TABLET | Refills: 3 | Status: SHIPPED | OUTPATIENT
Start: 2025-01-02

## 2025-01-02 RX ORDER — HYDROCHLOROTHIAZIDE 12.5 MG/1
12.5 TABLET ORAL DAILY
Qty: 30 TABLET | Refills: 1 | Status: SHIPPED
Start: 2025-01-02 | End: 2025-01-31

## 2025-01-02 ASSESSMENT — FIBROSIS 4 INDEX: FIB4 SCORE: 1.61

## 2025-01-02 ASSESSMENT — PATIENT HEALTH QUESTIONNAIRE - PHQ9: CLINICAL INTERPRETATION OF PHQ2 SCORE: 0

## 2025-01-02 NOTE — PROGRESS NOTES
Subjective     Antonio Treviño is a 81 y.o. female who presents with No chief complaint on file.            HPI   84 year old woman with spinal stenosis s/p laminectomy complicated by periperal neuropathy, also with compression fracture - followed by neuro, osteoporosis and low vitamin d on forteo followed by endo, vitelligo, hx bppv s/p Epley with resolution, improved dry eye in setting of prior eye surgery, L wrist sprain s/p surgery and subsequent fracture, functional murmur (echo confirmed), leg cramps/venous insufficiency followed by vascular,  here for     Nausea, lightheadedness, and occipital headache  -Began spontaneously last night, patient reports that she has had no prior subsequent episodes, went to bed and when she awoke all symptoms had completely resolved.  She does note significant muscle tension in her neck and shoulders.      Htn  -No symptoms.  Brings log and blood pressures remain near or below her target on home log in the mornings, in the evenings they are in the 140s systolic, sometimes as high as 150s.  When she saw vascular medicine they told her to increase losartan to 50 mg once daily, no significant change in her home logs after increasing    -Denies chest pain, shortness of breath, orthopnea, paroxysmal nocturnal dyspnea    ROS           Objective     VSS    Physical Exam  General: Pleasant woman in no apparent distress, alert, responding appropriately.   Cv: rrr 2/6 early peaking tyrel 2ics rsb, no incr with valsalava.  Can lie flat, no jvd or hjr  Pulm: grossly ctab  Ext: no le edema b/l  Neuro: Cranial nerves 2-12 intact (although pupils are equal and round but do not significantly constrict with light bilaterally).  Speech normal.  Sensation grossly intact to light touch bilaterally in the distal arms and distal thighs.  Strength 5/5 bilaterally in gross , elbow flexion/extension, hip flexion, knee extension, dorsiflexion.  Finger to nose intact bilaterally.  Negative Starr's  bilaterally.  Gait normal. Neg cinci  Msk: tight trapezius b/l                Assessment & Plan     N/v/ha, likely tesion headache  -benign neuro exam.  Sx resolved, only 1x occurrence.  Has tight bilateral trapezius muscles    Plan  -Monitor clinically for now.  Discussed gentle neck and shoulder stretching exercises to address possible trapezius muscle spasm causing pain at the base of her occiput    HTN  -not lightheaded.  No sx end organ damage  bp at goal today.  patient brings home BP log, all readings are at goal <130/80 with the exception of evening readings, no significant change with increasing dose of losartan.  Did previously have elevation of nighttime readings around watching scary movies per report  -Prev Discussed the possible morbidity/mortality concern related to prior mild Cr elevation per BMJ article.  Creatinine at baseline on last check    Plan  -Cont continue losartan 50 mg daily.  Will add hydrochlorothiazide 12.5 mg daily.  BMP in 1 week, follow-up in 2 to 4 weeks  -check renin/ney  -cont hydration    -cont diet/exercise/low salt diet  -previously cautioned any chest pain or shortness of breath > 5 minutes, any significant motor weakness, speech issues, or facial droop, cautioned patient to report to the emergency department immediately.    Patient reports has stopped gabapentin, was previously tried for peripheral neuropathy symptoms s/p spine surgery followed by neuro,, defer to neurology for further mgmt, vascular also following for venous insuffiency, appreciate support of specialties.    Added lipids to prep for f/u, not otherwise adressed    From Prior Visit, not addressed:      R foot swelling, etio unclear, resolved  -Patient presents for follow-up of spontaneous onset right foot swelling.  D-dimer was elevated, patient presented to the emergency department where fortunately ultrasound was negative for DVT, patient was discharged home.  She reports complete resolution of swelling at  this point, and on exam she has no lower extremity edema.  Denies chest pain or shortness of breath.  Unclear reason for why D-dimer was elevated     Plan  -Discussed the potential for repeating ultrasound to ensure no blood clot, but given swelling has completely resolved, is asymptomatic, and had recent negative ultrasound, decided to monitor at present for now.  -Advised to continue to not utilize compression stockings as patient states swelling occurred after using and question if related to the fit being too tight thus obstructing blood flow  -Follow-up with vascular November, defer further management to them, appreciate support    Elevated ldl and ASCVD in setting of prediabetes  -ldl recently 177., prior 151, . .  Recent LFTs normal.  Patient reports was previously on atorvastatin but was stopped due to possible myalgias, will begin rosuvastatin 10 mg once daily, follow-up cholesterol levels in approximately 3 months.  Continue with hypertension treatment and diet/exercise interventions as previously discussed.  LDL 97 on crestor 10, excellent response.  Patient is reporting mild daily cramps that last for approximately 1 minute and resolve with massage in the setting of prior cramping issues, however patient reports these are slightly different.  Discussed the possibility of transitioning to pravastatin, patient prefers to continue on Crestor 10 mg daily.  Counseled any new or worsening cramping to alert me.  Today: Patient has hovered around prediabetes diagnosis, most recent is 6.0.  -Continue Crestor 10 mg daily, diet (patient has healthy BMI), linus chi, will follow-up A1c screening and lipids at either next visit or annual exam           Neck pressure likely 2/2 paraspinal/trapezius muscle spasms and poor posture/biomechanics, now worsening with intermittent radiculopathy  -No alarm symptoms.  Significant neck limitation of motion in addition to tight trapezius musculature along with poor biomechanics  while reading and baseline hunched posture are likely contributing to neck spasms which are for what ever reason exacerbated when she is standing and walking.  Notes resolution when she lies on her back.  Suspect poor posture/biomechanics along with potentially anxiety/stress are contributing to tight muscles.  Tried Home physical therapy for neck mobilization and hot tub which initially improved her symptoms, although have worsened.  XR neck normal.    Plan  -Continue home PT pending establishment with formal PT in July, appreciate support  -Reviewed recommendations on ergonomics and proper posture, including pillow positioning    -alarm precautions reviewed  -Follow-up in 2 months.        Prediabtes  -prev resolved, now fbg 108.  mild  Discussed continued diet/exericse.    Elevated ldl and ASCVD  -recently 177., prior 151.  Prev discussed, patient was not interested in statin at prior annual exam.  Given the LDL is now above 160 and patient has developed the additional risk factor of evening hypertension, and patient's ASCVD is elevated (above 30%, although a significant portion of this is age-related), revisited the possibility of statin initiation.  Patient is amenable.  Recent LFTs normal.  Patient reports was previously on atorvastatin but was stopped due to possible myalgias, will begin rosuvastatin 10 mg once daily, follow-up cholesterol levels in approximately 3 months.  Continue with hypertension treatment and diet/exercise interventions as previously discussed.  Today: LDL 97 on crestor 10, excellent response.  Patient is reporting mild daily cramps that last for approximately 1 minute and resolve with massage in the setting of prior cramping issues, however patient reports these are slightly different.  Discussed the possibility of transitioning to pravastatin, patient prefers to continue on Crestor 10 mg daily.  Counseled any new or worsening cramping to alert me.      Leg pressure likely 2/2 mild  "venous insufficeincy  -Prev: Varicosities on legs and symptoms are consistent with possible mild venous insufficiency.Discussed pathophysiology.    Today: tried compression stockings via lymphedema clinic, did not help and perhaps worsened.  As an aside, reports prior HERMINIA nl.  Overall daily sx but improved with stretching, exercise, massage, leg elevation.  Sx may have some overlap with neuropathy sx from prior back surgery, as states today that sx are going above knees.  Overall sx initially were not bothersome to the patient, but per report are now's gradually worsening and becoming bothersome.    Plan  -Continue leg elevation, healthy physical activity  -Discussed prolonged standing will exacerbate  -Patient is a healthy BMI  -Patient reports is unable to tolerate compression stockings due to her peripheral neuropathy, if she is able to better optimize her peripheral neuropathy perhaps she could tolerate compression stockings which would be helpful.  She reports trialing gabapentin and Lyrica without success (\"brain fog\"), has tried other medications as well.  Referral placed for her to reestablish with her neurologist Dr. Osborn for further management, appreciate support  -Referral to vein center for possible additional suggestions or intervention, did discuss briefly some of the pros and cons related to vein stripping procedures    Bmp to prep for htn f/u, patient reports stable at home, otherwise not addressed    Hx peripheral neuropathy in setting of spinal stenosis  -reports has tried gabapentin, lyrica, other meds without success.  -foot exam benign, recommended lotion to mild dry spots with socks over her feet to prevent slipping if she would like  -referral to re-establish with Dr. Osborn, appreciate support.  Defer further management to Dr. Osborn        Anxiety with panic attacks - jarred screen  -no SI/HI/AH/VH.  Patient reports \"all her life\" she has been anxious.  She notes worrying about many things " "in general, although denies worrying about \"the little things\".  She states that she will begin to ruminate on things and then at times this will trigger periods where she has a fast heart rate, develops a headache, and feels significantly anxious for a period of 1 to 2 hours, where this then subsides.  Denies obsessions or rituals thus OCD unlikely, denies embarrassment around socialization thus social anxiety disorder unlikely, denies concern about \"escaping to safety\" thus agoraphobia unlikely, do need to clarify whether there is an inciting traumatic event.  However, symptoms seem diffuse.  PHQ 2 negative making depression unlikely.  Symptoms are likely consistent with panic attacks with possible generalized anxiety disorder versus elevated anxiety      Plan  -continue diet/exercise  -discussed pathophysiology of anxiety/panic attacks generally, rec cbt resources.  Anxiety resournces hangout given.  -Safety contract reviewed  -Follow-up next visit on CBT resources and also jarred screen  -Patient now trialing [facility] mindful/meditation (reported previous visit, not otherwise addressed)          mild tsh fluctauation due to age, likely no current clinical significacne  -No clear symptoms referable to TSH, denies hot or cold intolerance, palpitations, skin or hair changes, does have chronic intermittent constipation that precedes TSH change and was present when patient had a normal TSH.  No thyromegaly.  -given age >70, TSH < 6.9, no clear symptoms, prior tsh nl, suggested we not treat at present and instead repeat TSH in 2-6 months, patient in agreement.  This is consistent with up-to-date recommendations.  Today: repeat TSH nl, d/w patient.  Follow clinicaly                Chronic dry cough, significantly improved but still present, possibly   secondary to postnasal drip  -Began after starting lisinopril, improved after discontinuing lisinopril but still occuring, patient has no smoking history, no shortness of " breath, and lungs have serially been without wheezing, making asthma or COPD less likely.  Prevoiusly and currently patient denied post nasal drip symptoms.  Did mention history of GERD but no current symptoms of heartburn.    Currently cough occurs 2-3 times per day, not bothering pt.      Plan:   -Discussed patient could trial netti pot 1-2x daily if she would like as this is a very low risk intervention, etiology is not completely clear.  Patient states symptoms are not really bothering her, also reasonable to follow clinically        Ca counseling in the setting of calcium channel blocker  -Patient inquiring whether calcium supplementation could be harmful or her bone health could be affected by calcium channel blocker/amlodipine.  Discussed I am unaware of this being an issue, review of up-to-date adverse reactions does not list osteopenia or porosis as an issue, although osteoarthritis is listed as a very rare possible side effect.  Discussed with patient.  Also reviewed the recommendation of 1200 mg total calcium including diet and supplementation, suggested patient specifically quantitate her dietary intake as she reports eating dairy.      Mild hyponatremia during recent ED visit for HA  -resolved, follow clinicaly.  Discussed lab results with patient.      Healthcare Maintenance (from last visit, not yet covered)    Suggest fit testing if wishes to continue, depression/anxiety/caregiver/ a1c screening/constipation.    Colonoscopy: 2017, GI following and she diann lschedule w/I next year, denies stool changes  HIV: neg 2022  Syphilis: neg 2022  Hep C: neg 2022  A1c: fbs previously 100, a1c 5.6, pt acctively working on diet/exercise.  Check a1c for monioring  ASCVD: prev discussed, pt prefers diet/exercise focus which is reasonable as her elevated ascvd largely age related, also discus coronary artery scoring/ct, pt prefers to observe with diet/exercise at present.  Denies cp/sob with exertion.  Smoking:  never  Alcohol Use: never  Recreational Drugs: never  Healthy Eating/Exercise: discussed veg inclined diet evidence based and exericse, patient is very physically active doing 1 hour walks, aquatherapy/exercise, home PT exercises, praised patients healthy efforts  Depression and Anxiety: neg phq2.  States some anxiety about age and future and what will happen to her  (who has dementia) if she was to pass away. States feeling ok overall though, declcines making visit to discuss further.  Offered to help support the patient at any time such as with coordinating support for care for her .  Immunizations: utd covid, pna, tdap, shingrix, flu shot    Mammogram: nl 2022, repeat 2024 if wishes.  PAP: s/p no non-cancer, prior pap nl, no further monitoring needed  DEXA: osteoporosis on tx per endo, apprecite support  Goals of Care: has advanced directive      Calf muscle cramps, etiology unclear  -Continue stretching and hydration  -Potassium magnesium and calcium all normal, discussed with patient  -Patient has tried taking a multivitamin with B vitamins, and in fact her B6 vitamin level was previously supratherapeutic, she reports no change in Symptomatology with this, as such likely no utility in trialing B vitamin supplementation.  Discussed possibility of using diltiazem, patient politely declines at present, stating that the cramps are only lasting a few minutes when they occur and that overall they are tolerable.  Discussed that we could revisit this if worsens, and that quinidine is another option although has a very significant side effect profile and would not recommend unless cramps are debilitating.  -subsequent visit:Patient reports calf cramping is only intermittently occurring, is doing the stretches daily and feels these are potentially helping.  Not otherwise addressed.  -today:. Reports improved with calf strecthing, hot tub.  Occasionally happenig now, not significantly bothering patient,  follow clinnically      History of functional murmur  -Echo had only mild valvular abnormalities and aortic sclerosis, otherwise normal.  Discussed with patient, continue diet and exercise, no further intervention needed.  Likely flow murmur.    Mild leukopenia, etiology unclear  -Patient previously denied fevers, chills, night sweats, well-appearing, weight is stable in EMR.  Was previously slightly elevated at 13.2, now 4.3.  since drawing labs as above, will repeat cbc with manual diff to trend wbc.  -repeta cbc nl, follow clinically.        ASCVD discussion, began and partially discussed.  -LDL is 151, given that the patient is not diabetic, has no history of cardiovascular disease, is a non-smoker, and is not hypertensive, her elevated ASCVD would be mostly age-related at this point.  Discussed with patient that overall healthy lifestyle interventions would be the most effective and appropriate thing to reduce her risk of cardiovascular disease, patient in agreement with this plan.  At follow-up can complete this discussion but discussing the age component of the ASCVD elevation and her preference to treat this versus not with statin therapy.    Constipation, improved  -Not fully addressed today.  Patient reports daily bowel movements although they are somewhat hard, states that she is eating increased fiber and using MiraLAX as needed.  Offered to discuss further at future visit, patient states she is fine with monitoring for now.     Spinal stenosis - appreciate neuro support, encourage patient to follow-up with them regarding any future management of her gabapentin or symptoms.    Endocrine referral placed per patient request for injectible osteoporosis therapy to re-establish, apprecirae support.  Counseled that need for repeating DEXA scans on medical therapy for osteoporosis is not clear, probably would not benefit with repeat before 5 years, but will defer to endocrine given they are the primary team  managing her Forteo injections.  Also noted that pth ordered by her previous endocrinologist was low, but ca and vit d nl.  Defer to endocrine for further mgmt.    Counseled patient that if she is eating a well totally well-balanced/healthy diet that she does not need to supplement with vitamins, and that they are not FDA approved so could contain contaminants.    Rtc 2-4 weeks to f/u htn titratio and prediabetes/elevated ldl.  Then 1 mo annual exam.  Then anxiety

## 2025-01-02 NOTE — TELEPHONE ENCOUNTER
Bryce Stewart D.O.  You36 minutes ago (2:57 PM)     SK  Definitely not.  Please continue losartan 50mg daily, begin new hydrocholorthiazide 12.5mg daily, and obtain bmp labwork in approximately 1 week.  Thanks!     Called and LVM - 1st attempt

## 2025-01-03 NOTE — TELEPHONE ENCOUNTER
Relayed message - patient wants to know if she needs to stop it specifically for the renin and aldosterone labs.

## 2025-01-03 NOTE — TELEPHONE ENCOUNTER
Bryce Stewart D.O.  You2 hours ago (10:24 AM)     SK  Good question but no, we do not want to lose control of  her bp     Called and spoke to patient. Pt is aware

## 2025-01-04 ENCOUNTER — HOSPITAL ENCOUNTER (OUTPATIENT)
Dept: LAB | Facility: MEDICAL CENTER | Age: 85
End: 2025-01-04
Attending: INTERNAL MEDICINE
Payer: COMMERCIAL

## 2025-01-04 PROCEDURE — 82088 ASSAY OF ALDOSTERONE: CPT

## 2025-01-04 PROCEDURE — 36415 COLL VENOUS BLD VENIPUNCTURE: CPT

## 2025-01-04 PROCEDURE — 84244 ASSAY OF RENIN: CPT

## 2025-01-06 LAB — ALDOST SERPL-MCNC: 9.6 NG/DL

## 2025-01-07 LAB — RENIN PLAS-CCNC: 1.9 NG/ML/HR

## 2025-01-09 ENCOUNTER — APPOINTMENT (OUTPATIENT)
Dept: NEUROLOGY | Facility: MEDICAL CENTER | Age: 85
End: 2025-01-09
Attending: SPECIALIST
Payer: COMMERCIAL

## 2025-01-13 RX ORDER — ROSUVASTATIN CALCIUM 10 MG/1
10 TABLET, COATED ORAL EVERY EVENING
Qty: 100 TABLET | Refills: 3 | Status: SHIPPED | OUTPATIENT
Start: 2025-01-13

## 2025-01-13 NOTE — TELEPHONE ENCOUNTER
Received request via: Pharmacy    Was the patient seen in the last year in this department? Yes    Does the patient have an active prescription (recently filled or refills available) for medication(s) requested? No    Pharmacy Name: [id number] - [place], [place] - [address]     Does the patient have skilled nursing Plus and need 100-day supply? (This applies to ALL medications) Patient does not have SCP

## 2025-01-27 ENCOUNTER — HOSPITAL ENCOUNTER (OUTPATIENT)
Dept: LAB | Facility: MEDICAL CENTER | Age: 85
End: 2025-01-27
Attending: INTERNAL MEDICINE
Payer: COMMERCIAL

## 2025-01-27 DIAGNOSIS — I10 HYPERTENSION, UNSPECIFIED TYPE: ICD-10-CM

## 2025-01-27 DIAGNOSIS — E78.5 HYPERLIPIDEMIA, UNSPECIFIED HYPERLIPIDEMIA TYPE: ICD-10-CM

## 2025-01-27 LAB
ANION GAP SERPL CALC-SCNC: 14 MMOL/L (ref 7–16)
BUN SERPL-MCNC: 20 MG/DL (ref 8–22)
CALCIUM SERPL-MCNC: 9.6 MG/DL (ref 8.5–10.5)
CHLORIDE SERPL-SCNC: 101 MMOL/L (ref 96–112)
CHOLEST SERPL-MCNC: 188 MG/DL (ref 100–199)
CO2 SERPL-SCNC: 22 MMOL/L (ref 20–33)
CREAT SERPL-MCNC: 0.66 MG/DL (ref 0.5–1.4)
FASTING STATUS PATIENT QL REPORTED: NORMAL
GFR SERPLBLD CREATININE-BSD FMLA CKD-EPI: 86 ML/MIN/1.73 M 2
GLUCOSE SERPL-MCNC: 95 MG/DL (ref 65–99)
HDLC SERPL-MCNC: 65 MG/DL
LDLC SERPL CALC-MCNC: 106 MG/DL
POTASSIUM SERPL-SCNC: 4.1 MMOL/L (ref 3.6–5.5)
SODIUM SERPL-SCNC: 137 MMOL/L (ref 135–145)
TRIGL SERPL-MCNC: 87 MG/DL (ref 0–149)

## 2025-01-27 PROCEDURE — 80061 LIPID PANEL: CPT

## 2025-01-27 PROCEDURE — 36415 COLL VENOUS BLD VENIPUNCTURE: CPT

## 2025-01-27 PROCEDURE — 80048 BASIC METABOLIC PNL TOTAL CA: CPT

## 2025-01-31 ENCOUNTER — OFFICE VISIT (OUTPATIENT)
Dept: INTERNAL MEDICINE | Facility: OTHER | Age: 85
End: 2025-01-31
Payer: COMMERCIAL

## 2025-01-31 VITALS
SYSTOLIC BLOOD PRESSURE: 137 MMHG | DIASTOLIC BLOOD PRESSURE: 69 MMHG | HEART RATE: 74 BPM | BODY MASS INDEX: 19.98 KG/M2 | OXYGEN SATURATION: 98 % | WEIGHT: 108.6 LBS | TEMPERATURE: 96.2 F | HEIGHT: 62 IN

## 2025-01-31 DIAGNOSIS — I10 HYPERTENSION, UNSPECIFIED TYPE: ICD-10-CM

## 2025-01-31 DIAGNOSIS — R42 DIZZINESS: ICD-10-CM

## 2025-01-31 ASSESSMENT — ENCOUNTER SYMPTOMS
HEADACHES: 0
SEIZURES: 0
NAUSEA: 0
SENSORY CHANGE: 0
FALLS: 0
PALPITATIONS: 0
SHORTNESS OF BREATH: 0
TREMORS: 0
ABDOMINAL PAIN: 0
DOUBLE VISION: 0
BLURRED VISION: 0
LOSS OF CONSCIOUSNESS: 0
TINGLING: 0
DIZZINESS: 1

## 2025-01-31 ASSESSMENT — PAIN SCALES - GENERAL: PAINLEVEL_OUTOF10: 4=SLIGHT-MODERATE PAIN

## 2025-01-31 ASSESSMENT — FIBROSIS 4 INDEX: FIB4 SCORE: 1.61

## 2025-01-31 NOTE — ASSESSMENT & PLAN NOTE
Unstable BP readings at night (130-140's/70-80's w/occasional 150-160's/80's).  No signs/symptoms of end organ damage.   No signs/symptoms of medication side effects w/Losartan. Did not start HCTZ d/t concern for this medication worsening her dizziness.   Has been tried in multiple regimens to attempt to improve her evening BP readings.   - Discontinuing HCTZ.   - Continue Losartan 50 mg daily.   - Referral to Cardiology for further recommendations after shared decision making discussion with patient.      Immediate family member EMT/paramedic

## 2025-01-31 NOTE — PROGRESS NOTES
"    OFFICE VISIT    Antonio Treviño is a 84 y.o. female with PMH of Hypertension, Dyslipidemia. Presents today with the following:    Reason for visit:  Hypertension follow-up.  Did not start the hydrochlorothiazide as recommended because she was afraid it could lead her to feel more dizzy than she was originally feeling and fall.  She brought her BP log which again shows normal blood pressure readings in the a.m. and elevated readings in the evening.  After shared decision-making discussion she wishes to see cardiology for any further recommendations.  Dizziness.  Refers this is new within the last 1 to 2 months.  It is mainly related to changes in posture, specifically when she tries to get up from sitting or laying down.  It tends to happen more in the mornings when she wakes up.  Occasional room spinning sensation but no any other specific vertigo-like symptoms.  She denies any chest pain, palpitations, nausea, diaphoresis, headaches, visual changes or any other specific symptoms except as above.    FOR FURTHER DETAILS REGARDING MANAGEMENT PLAN, PLEASE SEE BELOW.     Review of Systems   HENT:  Negative for tinnitus.    Eyes:  Negative for blurred vision and double vision.   Respiratory:  Negative for shortness of breath.    Cardiovascular:  Negative for chest pain, palpitations and leg swelling.   Gastrointestinal:  Negative for abdominal pain and nausea.   Musculoskeletal:  Negative for falls.   Neurological:  Positive for dizziness. Negative for tingling, tremors, sensory change, seizures, loss of consciousness and headaches.       /69 (BP Location: Left arm, Patient Position: Standing, BP Cuff Size: Adult)   Pulse 74   Temp (!) 35.7 °C (96.2 °F) (Temporal)   Ht 1.575 m (5' 2\")   Wt 49.3 kg (108 lb 9.6 oz)   SpO2 98%   Breastfeeding No   BMI 19.86 kg/m²     Physical Exam  Constitutional:       General: She is not in acute distress.     Appearance: She is not ill-appearing.   Cardiovascular:      " Rate and Rhythm: Normal rate and regular rhythm.      Pulses: Normal pulses.      Heart sounds: Normal heart sounds.   Pulmonary:      Effort: Pulmonary effort is normal. No respiratory distress.      Breath sounds: Normal breath sounds.   Chest:      Chest wall: No tenderness.   Neurological:      Comments: Alert and oriented to person, time, and place  Follows commands  Speech is fluent  Pupils reactive to light and accomodation  Occular movements preserved  Facial symmetry preserved  Tongue is midline  Able to tolerate antigravity bilaterally, upper and lower extremities  No pronator drift  Sensation preserved throughout   DTR preserved, 2+ throughout  Gait partially unsteady  Coordination preserved  Romberg negative  Dixon-Halpike, HINTS negative         Assessment and Plan:   84 y.o. female with PMH of Hypertension, Dyslipidemia. The following was addressed during this visit:    Hypertension  Unstable BP readings at night (130-140's/70-80's w/occasional 150-160's/80's).  No signs/symptoms of end organ damage.   No signs/symptoms of medication side effects w/Losartan. Did not start HCTZ d/t concern for this medication worsening her dizziness.   Has been tried in multiple regimens to attempt to improve her evening BP readings.   - Discontinuing HCTZ.   - Continue Losartan 50 mg daily.   - Referral to Cardiology for further recommendations after shared decision making discussion with patient.       Dizziness  Ongoing 1-2 months.   Mainly when standing from sitting or lying down.   Occasional room spinning sensation. No other symptoms.   Orthostatics evaluation normal.   Drinks 45-48 oz of liquids/day.   - Increase fluid intake to 60-65 oz per day.   - Discontinuing HCTZ.   - Continue Losartan at current dose.       Orders Placed This Encounter    REFERRAL TO CARDIOLOGY       Return in about 4 weeks (around 2/28/2025) for Long. For annual wellness visit in 1 month w/Dr. Stewart. .      Patient case was seen/ assessed/  discussed with Dr. Stewart.       Please note that this dictation was created using voice recognition software. I have made every reasonable attempt to correct obvious errors, but I expect that there are errors of grammar and possibly content that I did not discover before finalizing the note.       Signed by:    Saurabh Burgos M.D.  PGY-3 Internal Medicine Resident

## 2025-01-31 NOTE — PATIENT INSTRUCTIONS
Thank you for visiting us today!    Please make sure you're drinking up to 60-65 oz of fluids per day.   Please continue to take Losartan 50 mg daily.   Please continue to monitor your blood pressure at home. Notify us if it is persistently more than 160/90.  Please schedule appointment with Cardiology. Referral has been sent, once approved you will be notified via My Chart with the information for you to call and coordinate.   We are discontinuing the Hydrochlorothiazide.

## 2025-02-01 NOTE — ASSESSMENT & PLAN NOTE
Ongoing 1-2 months.   Mainly when standing from sitting or lying down.   Occasional room spinning sensation. No other symptoms.   Orthostatics evaluation normal.   Drinks 45-48 oz of liquids/day.   - Increase fluid intake to 60-65 oz per day.   - Discontinuing HCTZ.   - Continue Losartan at current dose.

## 2025-02-06 ENCOUNTER — TELEPHONE (OUTPATIENT)
Dept: HEALTH INFORMATION MANAGEMENT | Facility: OTHER | Age: 85
End: 2025-02-06
Payer: COMMERCIAL

## 2025-02-13 ENCOUNTER — TELEPHONE (OUTPATIENT)
Dept: NEUROLOGY | Facility: MEDICAL CENTER | Age: 85
End: 2025-02-13
Payer: COMMERCIAL

## 2025-02-14 NOTE — TELEPHONE ENCOUNTER
NEUROLOGY PATIENT PRE-VISIT PLANNING     Patient was NOT contacted to complete PVP.  Note: Patient will not be contacted if there is no indication to call.     Patient Appointment is scheduled as: Established Patient     Is visit type and length scheduled correctly? Yes    [facility] is checked in Patient Demographics? Yes    3.   Is referral attached to visit? Yes    4. Were records received from referring provider? Yes    4. Patient was NOT contacted to have someone accompany them to visit.     5. Is this appointment scheduled as a Hospital Follow-Up?  No    6. Does the patient require any pre procedure or post procedure follow up? No    7. If any orders were placed at last visit or intended to be done for this visit do we have Results/Consult Notes? No  Labs - Labs were not ordered at last office visit.  Imaging - Imaging was not ordered at last office visit.  Referrals - Referral ordered, patient has NOT been seen.  Note: If patient appointment is for lab or imaging review and patient did not complete the studies, check with provider if OK to reschedule patient until completed.    8. If patient appointment is for Botox - is order pended for provider? N/A    9. Was Plan Assessment from last Neurology Office Visit Reviewed?  Yes

## 2025-02-20 ENCOUNTER — OFFICE VISIT (OUTPATIENT)
Dept: NEUROLOGY | Facility: MEDICAL CENTER | Age: 85
End: 2025-02-20
Attending: SPECIALIST
Payer: COMMERCIAL

## 2025-02-20 VITALS
BODY MASS INDEX: 19.49 KG/M2 | OXYGEN SATURATION: 94 % | SYSTOLIC BLOOD PRESSURE: 98 MMHG | WEIGHT: 110.01 LBS | DIASTOLIC BLOOD PRESSURE: 48 MMHG | HEIGHT: 63 IN | HEART RATE: 77 BPM | TEMPERATURE: 96.7 F

## 2025-02-20 DIAGNOSIS — M54.50 CHRONIC BILATERAL LOW BACK PAIN WITHOUT SCIATICA: ICD-10-CM

## 2025-02-20 DIAGNOSIS — M54.2 NECK PAIN: ICD-10-CM

## 2025-02-20 DIAGNOSIS — G89.29 CHRONIC BILATERAL LOW BACK PAIN WITHOUT SCIATICA: ICD-10-CM

## 2025-02-20 PROCEDURE — 3078F DIAST BP <80 MM HG: CPT | Performed by: SPECIALIST

## 2025-02-20 PROCEDURE — 3074F SYST BP LT 130 MM HG: CPT | Performed by: SPECIALIST

## 2025-02-20 PROCEDURE — 99214 OFFICE O/P EST MOD 30 MIN: CPT | Performed by: SPECIALIST

## 2025-02-20 PROCEDURE — 99212 OFFICE O/P EST SF 10 MIN: CPT | Performed by: SPECIALIST

## 2025-02-20 ASSESSMENT — FIBROSIS 4 INDEX: FIB4 SCORE: 1.61

## 2025-02-20 ASSESSMENT — PATIENT HEALTH QUESTIONNAIRE - PHQ9: CLINICAL INTERPRETATION OF PHQ2 SCORE: 0

## 2025-02-20 NOTE — PROGRESS NOTES
Subjective     Antonio Treviño is a 84 y.o. female who presents with Follow-Up (Bilateral lumbar radiculopathy)            HPI  I last saw Mrs. Antonio Treviño on June 27.  She was prescribed gabapentin once again for chronic back pain.  She states she could not tolerate it once again due to sedation.  Her  has Alzheimer's disease and she has to take care of him.  Once again she has had back pain as far back as 2014.  Lumbar MRI scan in January 2016 revealed subacute L2 superior endplate compression fractures with approximately 50% loss of height and 5 mm retropulsion of the posterior vertebral body cortex and a broad-based posterior disc protrusion at at L3-4.  She had central canal stenosis.  An EMG by Dr. Quintero on October 31, 2019 revealed denervation in the L3-5 paraspinous muscles on the left.  She underwent a lumbar decompression procedure by Marcellus Land MD on May 29, 2020 but her symptoms did not change.  She was seen by pain management and had epidurals which did not help.  I first saw her on September 13, 2021.  She improved with gabapentin but felt that she was sedated on the medication.  She has been tried on it several times.  She has also failed Cymbalta and did not feel the Lyrica helped.  Her repeat EMG on February 3, 2021 revealed chronic inactive radiculopathy that itself was not diagnostic.  She also reports neck pain and shoulder pain.  A cervical MRI in March 2022 revealed only mild chronic degenerative changes.    Past medical history:    1.  Lumbar compression fracture as above.    2.  Lumbar decompression as above.    3.  Probable venous insufficiency in her legs.    4.  Status post hysterectomy.    Medications-Crestor 10 mg daily, Cozaar 25 mg daily, calcium carbonate    Allergies-codeine and morphine.    PHQ-9 Depression Screening      Frequency of the following problems over the past two weeks:  Little interest or pleasure in doing things  0 - not at all  1 - several days  2 - more than  "half the days  3 - nearly every day  Feeling down, depressed, or hopeless  0 - not at all  1 - several days  2 - more than half the days  3 - nearly every day  PHQ-2 Score:  PHQ-9 Score:  ROS  As above she reports pain in her legs and feet when she is up on them.         Objective     BP 98/48 (BP Location: Left arm, Patient Position: Sitting, BP Cuff Size: Adult)   Pulse 77   Temp 35.9 °C (96.7 °F) (Temporal)   Ht 1.6 m (5' 3\")   Wt 49.9 kg (110 lb 0.2 oz)   SpO2 94%   BMI 19.49 kg/m²      Physical Exam  Patient is a cooperative woman who appears her stated age.  Speech is fluent and mental status is grossly intact.    HEENT exam reveals to be normocephalic and atraumatic.  Pupils are equal round reactive.  EOMs are full visual fields are full.  She again has right hemifacial spasm.    She has mild cervical tenderness.  She has mild lumbar tenderness.          Neurological Exam  She stands without difficulty.  Her gait is unremarkable.  She has no drift and no dysmetria.    Motor testing reveals intact bulk tone and strength throughout.    She has intact pin sensation in both feet.    Reflexes are 1+ at the arms and knees absent at the ankles and she has downgoing toes.    Cranial nerves are remarkable for right hemifacial spasm.         Patient is an 84-year-old woman who sustained a lumbar compression fracture in 2016.  On a 2019 EMG she was noted to be denervated in the left L3-L5 paraspinous muscles indicating she had a lumbar radiculopathy at that time.  She developed lower extremity pain following the compression fractures.  She had a decompressive laminectomy in May 2020 but her symptoms did not improve.  She improved with gabapentin and was sedated on the medication.  Does not feel she can take it.  She is reporting neck pain as well but only had mild degenerative changes on the cervical MRI.  She likely will not tolerate medications.  She is referred back to physical therapy.  I will see her back in " 6 months.         Assessment & Plan  Chronic bilateral low back pain without sciatica    Orders:    Referral to Physical Therapy    Neck pain    Orders:    Referral to Physical Therapy

## 2025-02-21 NOTE — Clinical Note
REFERRAL APPROVAL NOTICE         Sent on February 21, 2025                   Antonio Treviño  [address] [place]  [place] [address]                   Dear Ms. Treviño,    After a careful review of the medical information and benefit coverage, [facility] has processed your referral. See below for additional details.    If applicable, you must be actively enrolled with your insurance for coverage of the authorized service. If you have any questions regarding your coverage, please contact your insurance directly.    REFERRAL INFORMATION   Referral #:  [id number]  Referred-To Department    Referred-By Provider:  Physical Therapy    EVELYNE Osborn M.D.   [facility] [address]      [address]  [address]  [place] [address] [phone] [address]  [address]  [place] [id number] [phone]    Referral Start Date:  02/20/2025  Referral End Date:   02/20/2026             SCHEDULING  If you do not already have an appointment, please call [phone] to make an appointment.     MORE INFORMATION  If you do not already have a [employer] account, sign up at: [email or web address]  You can access your medical information, make appointments, see lab results, billing information, and more.  If you have questions regarding this referral, please contact  the [facility] Referrals department at:             [phone]. Monday - Friday 8:00AM - 5:00PM.     Sincerely,    [facility]

## 2025-02-25 ENCOUNTER — TELEPHONE (OUTPATIENT)
Dept: CARDIOLOGY | Facility: MEDICAL CENTER | Age: 85
End: 2025-02-25
Payer: COMMERCIAL

## 2025-02-25 NOTE — TELEPHONE ENCOUNTER
Called patient in regards to records for NP appointment with Dr. REZA To review most recent lab results, ekg, any cardiac testing or ,if patient has been treated by a cardiologist. No answer, left voicemail to call back

## 2025-02-28 ENCOUNTER — OFFICE VISIT (OUTPATIENT)
Dept: CARDIOLOGY | Facility: MEDICAL CENTER | Age: 85
End: 2025-02-28
Payer: COMMERCIAL

## 2025-02-28 VITALS
OXYGEN SATURATION: 96 % | HEIGHT: 63 IN | RESPIRATION RATE: 16 BRPM | SYSTOLIC BLOOD PRESSURE: 104 MMHG | WEIGHT: 103 LBS | BODY MASS INDEX: 18.25 KG/M2 | DIASTOLIC BLOOD PRESSURE: 64 MMHG | HEART RATE: 72 BPM

## 2025-02-28 DIAGNOSIS — R01.1 SYSTOLIC MURMUR: ICD-10-CM

## 2025-02-28 DIAGNOSIS — I10 PRIMARY HYPERTENSION: Chronic | ICD-10-CM

## 2025-02-28 DIAGNOSIS — E78.5 DYSLIPIDEMIA: ICD-10-CM

## 2025-02-28 LAB — EKG IMPRESSION: NORMAL

## 2025-02-28 PROCEDURE — 99211 OFF/OP EST MAY X REQ PHY/QHP: CPT | Performed by: INTERNAL MEDICINE

## 2025-02-28 PROCEDURE — 93005 ELECTROCARDIOGRAM TRACING: CPT | Mod: TC | Performed by: INTERNAL MEDICINE

## 2025-02-28 RX ORDER — LOSARTAN POTASSIUM 50 MG/1
25 TABLET ORAL
Qty: 90 TABLET | Refills: 3
Start: 2025-02-28

## 2025-02-28 ASSESSMENT — FIBROSIS 4 INDEX: FIB4 SCORE: 1.61

## 2025-02-28 NOTE — PROGRESS NOTES
"Chief Complaint   Patient presents with    Hypertension    Heart Murmur       Subjective     Antonio Treviño is a 84 y.o. female who presents today  in consultation from Saurabh Vicente for evaluation of hypertension she has been following her blood pressures started on blood pressure reasons a couple years ago had side effects to both lisinopril and amlodipine quite quickly and has been on losartan which she takes at 5 PM she is to take it at 1 PM she noticed that her a.m. blood pressures are fine but her evening blood pressures can be high.        She has dyslipidemia and was on atorvastatin for many years this was stopped within a recent and restarted she is unsure if she has arthritis since restarting the medicine her LDL is excellently controlled on the low-dose medicine    Past Medical History:   Diagnosis Date    Anesthesia     nausea   \"no narcotics\"    Bowel habit changes     constipation    Cataract     bilateral IOL    Dry eyes     Heart murmur     High cholesterol 11/02/2020    no medication    Hyperlipidemia     Left ovarian cyst 11/11/2020    Osteopenia     OSTEOPOROSIS     Thickened endometrium 11/11/2020    Vitiligo      Past Surgical History:   Procedure Laterality Date    HYSTERECTOMY LAPAROSCOPY  11/11/2020    Procedure: HYSTERECTOMY, LAPAROSCOPIC-TOTAL;  Surgeon: Max Villatoro M.D.;  Location: SURGERY SAME DAY [facility];  Service: Gynecology    SALPINGO OOPHORECTOMY Bilateral 11/11/2020    Procedure: SALPINGO-OOPHORECTOMY;  Surgeon: Max Villatoro M.D.;  Location: SURGERY SAME DAY [facility];  Service: Gynecology    CYSTOSCOPY  11/11/2020    Procedure: CYSTOSCOPY-DIAGNOSTIC;  Surgeon: Max Villatoro M.D.;  Location: SURGERY SAME [facility];  Service: Gynecology    OTHER  2020    laminectomy    CATARACT PHACO WITH IOL  7/2/2013    Performed by Bin Cardona M.D. at [facility]    TENDON TRANSFER       History reviewed. No pertinent family history.  Social History " "    Socioeconomic History    Marital status:      Spouse name: Not on file    Number of children: Not on file    Years of education: Not on file    Highest education level: Not on file   Occupational History    Not on file   Tobacco Use    Smoking status: Never    Smokeless tobacco: Never   Vaping Use    Vaping status: Never Used   Substance and Sexual Activity    Alcohol use: No    Drug use: No    Sexual activity: Not on file   Other Topics Concern    Not on file   Social History Narrative    Not on file     Social Drivers of Health     Financial Resource Strain: Not on file   Food Insecurity: Not on file   Transportation Needs: Not on file   Physical Activity: Not on file   Stress: Not on file   Social Connections: Not on file   Intimate Partner Violence: Not on file   Housing Stability: Not on file     Allergies   Allergen Reactions    Codeine      Nausea and vommiting  Other reaction(s): nausea and vomiting    Morphine      Nausea amd vomitting  Other reaction(s): nausea and vomiting    Amlodipine     Lisinopril      cough     Outpatient Encounter Medications as of 2/28/2025   Medication Sig Dispense Refill    rosuvastatin (CRESTOR) 10 MG Tab TAKE ONE TABLET BY MOUTH IN THE EVENING 100 Tablet 3    losartan (COZAAR) 50 MG Tab Take 1 Tablet by mouth every day. 90 Tablet 3    Diosmin Powder Take 1,000 mg by mouth 2 times a day. To improve vein function      Calcium Carbonate 1500 (600 Ca) MG Tab CALCIUM CARBONATE 600 MG TABS      Cholecalciferol (VITAMIN D HIGH POTENCY PO) Take 6,000 Units by mouth every day.       No facility-administered encounter medications on file as of 2/28/2025.     ROS           Objective     /64 (BP Location: Left arm, Patient Position: Sitting, BP Cuff Size: Adult)   Pulse 72   Resp 16   Ht 1.6 m (5' 3\")   Wt 46.7 kg (103 lb)   SpO2 96%   BMI 18.25 kg/m²     Physical Exam  Constitutional:       General: She is not in acute distress.     Appearance: She is not " diaphoretic.   Eyes:      General: No scleral icterus.  Neck:      Vascular: No JVD.   Cardiovascular:      Rate and Rhythm: Normal rate.      Heart sounds: Normal heart sounds. No murmur heard.     No friction rub. No gallop.   Pulmonary:      Effort: No respiratory distress.      Breath sounds: No wheezing or rales.   Abdominal:      General: Bowel sounds are normal.      Palpations: Abdomen is soft.   Musculoskeletal:      Right lower leg: No edema.      Left lower leg: No edema.   Skin:     Findings: No rash.   Neurological:      Mental Status: She is alert. Mental status is at baseline.   Psychiatric:         Mood and Affect: Mood normal.            We reviewed in person the most recent labs  Recent Results (from the past 30 weeks)   Basic Metabolic Panel    Collection Time: 08/05/24  7:38 AM   Result Value Ref Range    Sodium 137 135 - 145 mmol/L    Potassium 4.4 3.6 - 5.5 mmol/L    Chloride 102 96 - 112 mmol/L    Co2 26 20 - 33 mmol/L    Glucose 108 (H) 65 - 99 mg/dL    Bun 19 8 - 22 mg/dL    Creatinine 0.68 0.50 - 1.40 mg/dL    Calcium 9.2 8.4 - 10.2 mg/dL    Anion Gap 9.0 7.0 - 16.0   FASTING STATUS    Collection Time: 08/05/24  7:38 AM   Result Value Ref Range    Fasting Status Fasting    ESTIMATED GFR    Collection Time: 08/05/24  7:38 AM   Result Value Ref Range    GFR (CKD-EPI) 85 >60 mL/min/1.73 m 2   HEMOGLOBIN A1C    Collection Time: 08/15/24 11:43 AM   Result Value Ref Range    Glycohemoglobin 6.0 (H) 4.0 - 5.6 %    Est Avg Glucose 126 mg/dL   D-DIMER    Collection Time: 08/15/24 11:43 AM   Result Value Ref Range    D-Dimer 0.94 (H) 0.00 - 0.50 ug/mL (FEU)   PTH INTACT (PTH ONLY)    Collection Time: 11/01/24  6:54 AM   Result Value Ref Range    Pth, Intact 19.5 14.0 - 72.0 pg/mL   VITAMIN D,25 HYDROXY (DEFICIENCY)    Collection Time: 11/01/24  6:54 AM   Result Value Ref Range    25-Hydroxy   Vitamin D 25 50 30 - 100 ng/mL   CALCIUM (CA)    Collection Time: 11/01/24  6:54 AM   Result Value Ref Range     Calcium 9.9 8.4 - 10.2 mg/dL   IONIZED CALCIUM    Collection Time: 24  6:55 AM   Result Value Ref Range    Ionized Calcium 1.25 1.10 - 1.30 mmol/L   RENIN ACTIVITY    Collection Time: 25  2:54 PM   Result Value Ref Range    Renin Activity 1.9 ng/mL/hr   ALDOSTERONE    Collection Time: 25  2:54 PM   Result Value Ref Range    Aldos Serum 9.6 ng/dL   Lipid Profile    Collection Time: 25  7:13 AM   Result Value Ref Range    Cholesterol,Tot 188 100 - 199 mg/dL    Triglycerides 87 0 - 149 mg/dL    HDL 65 >=40 mg/dL     (H) <100 mg/dL   Basic Metabolic Panel    Collection Time: 25  7:13 AM   Result Value Ref Range    Sodium 137 135 - 145 mmol/L    Potassium 4.1 3.6 - 5.5 mmol/L    Chloride 101 96 - 112 mmol/L    Co2 22 20 - 33 mmol/L    Glucose 95 65 - 99 mg/dL    Bun 20 8 - 22 mg/dL    Creatinine 0.66 0.50 - 1.40 mg/dL    Calcium 9.6 8.5 - 10.5 mg/dL    Anion Gap 14.0 7.0 - 16.0   FASTING STATUS    Collection Time: 25  7:13 AM   Result Value Ref Range    Fasting Status Fasting    ESTIMATED GFR    Collection Time: 25  7:13 AM   Result Value Ref Range    GFR (CKD-EPI) 86 >60 mL/min/1.73 m 2   EKG    Collection Time: 25 10:42 AM   Result Value Ref Range    Report       [facility]    Test Date:  2025  Pt Name:    RAJ MILLER                  Department: [facility]  MRN:        [id number]                      Room:  Gender:     Female                       Technician: DKETHAN  :        [id number]                   Requested By:MARCIA ARMENTA  Order #:    [id number]                    Reading MD:    Measurements  Intervals                                Axis  Rate:       70                           P:          52  MO:         239                          QRS:        -14  QRSD:       84                           T:          -3  QT:         392  QTc:        423    Interpretive Statements  Sinus rhythm  Prolonged MO interval  Borderline T wave  abnormalities  Compared to ECG 11/20/2023 18:06:16  T-wave abnormality now present       Echo images reviewed with ptin 2022 mild sclerosis and AI      Assessment & Plan     1. Systolic murmur  EKG      2. Primary hypertension  losartan (COZAAR) 50 MG Tab      3. Dyslipidemia            Medical Decision Making: Today's Assessment/Status/Plan:      It was my pleasure to meet with Ms. Treviño.    We addressed the management of hypertension at today's visit. Blood pressure is well controlled.  We specifically assessed the labs on hypertension treatment  SWITCH TO LOSARTAN 25 BID    We addressed the management of dyslipidemia at today's visit. She is on appropriate lipid lowering medication.  TRIAL STATIN HOLIDAY    Ms. Treviño does not require regular cardiology follow up, I have advised her to call our office or e-mail using my [employer] if needed.    It is my pleasure to participate in the care of Ms. Treviño.  Please do not hesitate to contact me with questions or concerns.    August Park MD PhD [facility]  Cardiologist [facility]    Please note that this dictation was created using voice recognition software. There may be errors I did not discover before finalizing the note.

## 2025-02-28 NOTE — PATIENT INSTRUCTIONS
Work on at least 2.5 - 5 hours a week of moderate exercise (typical brisk walking or similar activity)    If you have had a heart attack, stent, bypass or reduced heart strength (EF <35%): cardiac rehab may reduce your risk of dying by 13-24% and need to go to the hospital by 30% within the first year (1)    Please look into the following diets and incorporate them into your diet    CONSIDER CHECKING A CALCIUM SCORE TO UNDERSTAND YOUR RISK MORE    https://internal.[facility].org/about/procedures/tools/bvcf-zubmx-psrh-calculator    LOW SALT DIET   KEEP YOUR SODIUM EQUAL TO CALORIES AND NO MORE THAN DOUBLE THE CALORIES FOR A LOW SALT DIET    Cardiosmart.org - great resource for American College of Cardiology on heart disease prevention and treatment    FOR TREATMENT OR PREVENTION OF CORONARY ARTERY DISEASE  These three programs are approved by Medicare/Insurers for those with heart disease  [place] [facility]  Dr. Websetr's Program for Reversing Heart Disease - Benjie Bentley's Cardiologist vegetarian-based  [facility] - [place]-based mind-body Program    Mediterranean Diet has been shown to be a hearty healthy diet.    This is a commonly referenced Program  Dr Will - [facility] over [employer] (book and documentary) - vegetarian-based    FOR TREATMENT OF BLOOD PRESSURE  DASH DIET - American Heart Association for treatment of HYPERTENSION    FOR TREATMENT OF BAD CHOLESTEROL/FATS  REDUCE PROCESSED SUGAR AS MUCH AS POSSIBLE  INCREASE WHOLE GRAINS/VEGETABLES  INCREASE FIBER    Lowering total cholesterol and LDL (bad) cholesterol:  - Eat leaner cuts of meat, or eliminate altogether if possible red meat, and frequently substitute fish or chicken.  - Limit saturated fat to no more than 7-10% of total calories no more than 10 g per day is recommended. Some sources of saturated fat include butter, animal fats, hydrogenated vegetable fats and oils, many desserts, whole  milk dairy products.  - Replaced saturated fats with polyunsaturated fats and monounsaturated fats. Foods high in monounsaturated fat include nuts, canola oil, avocados, and olives.  - Limit trans fat (processed foods) and replaced with fresh fruits and vegetables  - Recommend nonfat dairy products  - Increase substantially the amount of soluble fiber intake (legumes such as beans, fruit, whole grains).  - Consider nutritional supplements: plant sterile spreads such as Benecol, fish oil,  flaxseed oil, omega-3 acids capsules 1000 mg twice a day, or viscous fiber such as Metamucil  - Attain ideal weight and regular exercise (at least 30 minutes per day of moderate exercise)  ASK ABOUT STATIN OR NON STATIN MEDICATION TO REDUCE YOUR LDL AND HEART RISK    Lowering triglycerides:  - Reduce intake of simple sugar: Desserts, candy, pastries, honey, sodas, sugared cereals, yogurt, Gatorade, sports bars, canned fruit, smoothies, fruit juice, coffee drinks  - Reduced intake of refined starches: Refined Pasta, most bread  - Reduce or abstain from alcohol  - Increase omega-3 fatty acids: [place], Trout, Mackerel, [place], [place] tuna and supplements  - Attain ideal weight and regular exercise (at least 30 minutes per day of moderate exercise)  ASK ABOUT PURIFIED OMEGA 3 EPA or FISH OIL TO REDUCE YOUR TG AND HEART RISK    Elevating HDL (good) cholesterol:  - Increase physical activity  - Increase omega-3 fatty acids and supplements as listed above  - Incorporating appropriate amounts of monounsaturated fats such as nuts, olive oil, canola oil, avocados, olives  - Stop smoking  - Attain ideal weight and regular exercise (at least 30 minutes per day of moderate exercise)

## 2025-03-01 LAB — EKG IMPRESSION: NORMAL

## 2025-03-10 ENCOUNTER — OFFICE VISIT (OUTPATIENT)
Dept: INTERNAL MEDICINE | Facility: OTHER | Age: 85
End: 2025-03-10
Payer: COMMERCIAL

## 2025-03-10 VITALS
HEIGHT: 62 IN | TEMPERATURE: 97.4 F | BODY MASS INDEX: 19.91 KG/M2 | SYSTOLIC BLOOD PRESSURE: 117 MMHG | DIASTOLIC BLOOD PRESSURE: 74 MMHG | OXYGEN SATURATION: 97 % | WEIGHT: 108.2 LBS | HEART RATE: 72 BPM

## 2025-03-10 DIAGNOSIS — I10 PRIMARY HYPERTENSION: Chronic | ICD-10-CM

## 2025-03-10 DIAGNOSIS — Z00.00 PREVENTATIVE HEALTH CARE: ICD-10-CM

## 2025-03-10 DIAGNOSIS — K92.1 HEMATOCHEZIA: ICD-10-CM

## 2025-03-10 PROCEDURE — 3074F SYST BP LT 130 MM HG: CPT | Mod: GE

## 2025-03-10 PROCEDURE — 3078F DIAST BP <80 MM HG: CPT | Mod: GE

## 2025-03-10 PROCEDURE — 99213 OFFICE O/P EST LOW 20 MIN: CPT | Mod: GE

## 2025-03-10 ASSESSMENT — ENCOUNTER SYMPTOMS
LOSS OF CONSCIOUSNESS: 0
COUGH: 0
DIARRHEA: 0
HEARTBURN: 0
SHORTNESS OF BREATH: 0
FEVER: 0
WEIGHT LOSS: 0
ABDOMINAL PAIN: 0
BLOOD IN STOOL: 1
TINGLING: 0
DIZZINESS: 0
PALPITATIONS: 0

## 2025-03-10 ASSESSMENT — FIBROSIS 4 INDEX: FIB4 SCORE: 1.61

## 2025-03-10 NOTE — ASSESSMENT & PLAN NOTE
Cancer screening:     - LDCT: not indicated    - Mammogram: , normal. Not further screening        indicated.    - Colonoscopy: 2023 w/colon polyps removed, per       patient. No records on file.    - Cervical: , normal. No further screening indicated.   DEXA: . Osteopenia and Major Osteoporotic 18.7%, Hip 7.1%. Not interested in repeat screening.   STOP-BAN  HIV: Non-reactive ().  Hepatitis C: Non-reactive ().  A1c: 6.0% (2024).   ASCVD: On Rosuvastatin 10 mg daily.   Immunizations: up to date.   Tobacco: Never  EtOH: None  Recreational drugs: Never  IADL/ADL: No limitations  Diet: No restrictions  Goals of care: FULL CODE for now and wishes to think about the risks vs benefits discussed today prior to filling a POLST since she is concerned about her 's care d/t his Alzheimer's.

## 2025-03-10 NOTE — ASSESSMENT & PLAN NOTE
2 episodes of bright red blood in toilet paper in February w/o recurrence.   H/o colonic polyps.   Shared decision making discussion resulted in proceeding with Cologuard.   - Cologuard, ordered.

## 2025-03-10 NOTE — PATIENT INSTRUCTIONS
Thank you for visiting us today!    Please contact [facility] and schedule an appointment.    [address]   [address]   [place] [facility] [place] [address] [phone]  Please complete the Cologuard.   Please complete the lab work.  Follow-up in 3 months w/Dr. Stewart or sooner if needed.

## 2025-03-10 NOTE — ASSESSMENT & PLAN NOTE
BP readings continue to be 130-140's / 80's at night only.   BP throughout the AM and afternoon is within range.   No signs/symptoms of end organ damage.  Cardiology modified Losartan to 25 mg BID.   - Continue Losartan as per Cardiology recommendations.   - Continue to monitor BP at home.  - Educated in alarm signs/symptoms.

## 2025-03-10 NOTE — PROGRESS NOTES
"    PCP:  Bryce Stewart D.O.       OFFICE VISIT    Antonio Treviño is a 84 y.o. female with PMH of Hypertension, Dyslipidemia. Presents today with the following:    Reason for visit:  ANNUAL WELLNESS VISIT    She is up to date on her vaccinations. No cancer screening is currently indicated. ADL's and iADL's addressed and no limitations. For her chronic problems, Cardiology recommended Losartan 25 mg BID and she is doing this without any side effects or recurrence of her dizziness. We addressed goals of care and code status, and she referred that what matters most to her is being able to continue to care for her  who has Alzheimer's disease. Because of this, although she feels DNR/DNI would be best if she were to have a cardiac/respiratory arrest, she is concerned about her , and she specifically quoted what happened to actor Brenden Wade and his wife recently. Conclusion at this time was to be FULL CODE while she makes a decision based on the information shared today.     FOR FURTHER DETAILS REGARDING MANAGEMENT PLAN, PLEASE SEE BELOW.     Review of Systems   Constitutional:  Negative for fever, malaise/fatigue and weight loss.   Respiratory:  Negative for cough and shortness of breath.    Cardiovascular:  Negative for chest pain and palpitations.   Gastrointestinal:  Positive for blood in stool (In toilet paper). Negative for abdominal pain, diarrhea and heartburn.   Neurological:  Negative for dizziness, tingling and loss of consciousness.       /74 (BP Location: Left arm, Patient Position: Sitting, BP Cuff Size: Adult)   Pulse 72   Temp 36.3 °C (97.4 °F) (Temporal)   Ht 1.563 m (5' 1.55\")   Wt 49.1 kg (108 lb 3.2 oz)   SpO2 97%   BMI 20.08 kg/m²     Physical Exam  Constitutional:       General: She is not in acute distress.     Appearance: She is normal weight. She is not ill-appearing.   Cardiovascular:      Rate and Rhythm: Normal rate and regular rhythm.      Pulses: Normal pulses.      " Heart sounds: Normal heart sounds.   Pulmonary:      Effort: Pulmonary effort is normal. No respiratory distress.      Breath sounds: Normal breath sounds.   Chest:      Chest wall: No tenderness.   Abdominal:      General: Bowel sounds are normal. There is no distension.      Palpations: Abdomen is soft.      Tenderness: There is no abdominal tenderness.         Assessment and Plan:   84 y.o. female with PMH of Hypertension, Dyslipidemia. The following was addressed during this visit:     Preventative health care  Cancer screening:     - LDCT: not indicated    - Mammogram: , normal. Not further screening        indicated.    - Colonoscopy: 2023 w/colon polyps removed, per       patient. No records on file.    - Cervical: , normal. No further screening indicated.   DEXA: . Osteopenia and Major Osteoporotic 18.7%, Hip 7.1%. Not interested in repeat screening.   STOP-BAN  HIV: Non-reactive ().  Hepatitis C: Non-reactive ().  A1c: 6.0% (2024).   ASCVD: On Rosuvastatin 10 mg daily.   Immunizations: up to date.   Tobacco: Never  EtOH: None  Recreational drugs: Never  IADL/ADL: No limitations  Diet: No restrictions  Goals of care: FULL CODE for now and wishes to think about the risks vs benefits discussed today prior to filling a POLST since she is concerned about her 's care d/t his Alzheimer's.    Primary hypertension  BP readings continue to be 130-140's / 80's at night only.   BP throughout the AM and afternoon is within range.   No signs/symptoms of end organ damage.  Cardiology modified Losartan to 25 mg BID.   - Continue Losartan as per Cardiology recommendations.   - Continue to monitor BP at home.  - Educated in alarm signs/symptoms.     Hematochezia  2 episodes of bright red blood in toilet paper in February w/o recurrence.   H/o colonic polyps.   Shared decision making discussion resulted in proceeding with [place].   - Cologuard, ordered.       Orders Placed This  Encounter    Cologuard® colon cancer screening    CBC WITH DIFFERENTIAL       Return in about 3 months (around 6/10/2025).      Patient case was seen/ assessed/ discussed with Dr. Mejia.       Please note that this dictation was created using voice recognition software. I have made every reasonable attempt to correct obvious errors, but I expect that there are errors of grammar and possibly content that I did not discover before finalizing the note.       Signed by:    Saurabh Burgos M.D.  PGY-3 Internal Medicine Resident

## 2025-04-29 ENCOUNTER — HOSPITAL ENCOUNTER (OUTPATIENT)
Facility: MEDICAL CENTER | Age: 85
End: 2025-04-29
Attending: INTERNAL MEDICINE
Payer: COMMERCIAL

## 2025-04-29 LAB
25(OH)D3 SERPL-MCNC: 57 NG/ML (ref 30–100)
CA-I SERPL-SCNC: 1.22 MMOL/L (ref 1.1–1.3)
CALCIUM SERPL-MCNC: 9.6 MG/DL (ref 8.5–10.5)
PTH-INTACT SERPL-MCNC: 26.7 PG/ML (ref 14–72)

## 2025-04-29 PROCEDURE — 82306 VITAMIN D 25 HYDROXY: CPT

## 2025-04-29 PROCEDURE — 83970 ASSAY OF PARATHORMONE: CPT

## 2025-04-29 PROCEDURE — 82310 ASSAY OF CALCIUM: CPT

## 2025-04-29 PROCEDURE — 36415 COLL VENOUS BLD VENIPUNCTURE: CPT

## 2025-04-29 PROCEDURE — 82330 ASSAY OF CALCIUM: CPT

## 2025-04-30 ENCOUNTER — HOSPITAL ENCOUNTER (OUTPATIENT)
Dept: RADIOLOGY | Facility: MEDICAL CENTER | Age: 85
End: 2025-04-30
Attending: INTERNAL MEDICINE
Payer: COMMERCIAL

## 2025-04-30 DIAGNOSIS — M80.00XA AGE-RELATED OSTEOPOROSIS WITH CURRENT PATHOLOGICAL FRACTURE, UNSPECIFIED SITE, INITIAL ENCOUNTER FOR FRACTURE: ICD-10-CM

## 2025-04-30 PROCEDURE — 77080 DXA BONE DENSITY AXIAL: CPT

## 2025-06-16 ENCOUNTER — HOSPITAL ENCOUNTER (OUTPATIENT)
Dept: LAB | Facility: MEDICAL CENTER | Age: 85
End: 2025-06-16
Payer: COMMERCIAL

## 2025-06-16 DIAGNOSIS — K92.1 HEMATOCHEZIA: ICD-10-CM

## 2025-06-16 LAB
BASOPHILS # BLD AUTO: 1.2 % (ref 0–1.8)
BASOPHILS # BLD: 0.06 K/UL (ref 0–0.12)
EOSINOPHIL # BLD AUTO: 0.09 K/UL (ref 0–0.51)
EOSINOPHIL NFR BLD: 1.7 % (ref 0–6.9)
ERYTHROCYTE [DISTWIDTH] IN BLOOD BY AUTOMATED COUNT: 43.8 FL (ref 35.9–50)
HCT VFR BLD AUTO: 41.7 % (ref 37–47)
HGB BLD-MCNC: 14.6 G/DL (ref 12–16)
IMM GRANULOCYTES # BLD AUTO: 0.02 K/UL (ref 0–0.11)
IMM GRANULOCYTES NFR BLD AUTO: 0.4 % (ref 0–0.9)
LYMPHOCYTES # BLD AUTO: 1.06 K/UL (ref 1–4.8)
LYMPHOCYTES NFR BLD: 20.3 % (ref 22–41)
MCH RBC QN AUTO: 34.2 PG (ref 27–33)
MCHC RBC AUTO-ENTMCNC: 35 G/DL (ref 32.2–35.5)
MCV RBC AUTO: 97.7 FL (ref 81.4–97.8)
MONOCYTES # BLD AUTO: 0.5 K/UL (ref 0–0.85)
MONOCYTES NFR BLD AUTO: 9.6 % (ref 0–13.4)
NEUTROPHILS # BLD AUTO: 3.48 K/UL (ref 1.82–7.42)
NEUTROPHILS NFR BLD: 66.8 % (ref 44–72)
NRBC # BLD AUTO: 0 K/UL
NRBC BLD-RTO: 0 /100 WBC (ref 0–0.2)
PLATELET # BLD AUTO: 273 K/UL (ref 164–446)
PMV BLD AUTO: 10.2 FL (ref 9–12.9)
RBC # BLD AUTO: 4.27 M/UL (ref 4.2–5.4)
WBC # BLD AUTO: 5.2 K/UL (ref 4.8–10.8)

## 2025-06-16 PROCEDURE — 85025 COMPLETE CBC W/AUTO DIFF WBC: CPT

## 2025-06-16 PROCEDURE — 36415 COLL VENOUS BLD VENIPUNCTURE: CPT

## 2025-06-20 ENCOUNTER — OFFICE VISIT (OUTPATIENT)
Dept: INTERNAL MEDICINE | Facility: OTHER | Age: 85
End: 2025-06-20
Payer: COMMERCIAL

## 2025-06-20 VITALS
BODY MASS INDEX: 19.38 KG/M2 | HEART RATE: 69 BPM | DIASTOLIC BLOOD PRESSURE: 67 MMHG | WEIGHT: 109.4 LBS | SYSTOLIC BLOOD PRESSURE: 114 MMHG | TEMPERATURE: 98.2 F | HEIGHT: 63 IN | OXYGEN SATURATION: 95 %

## 2025-06-20 DIAGNOSIS — I10 HYPERTENSION, UNSPECIFIED TYPE: Primary | ICD-10-CM

## 2025-06-20 DIAGNOSIS — E78.5 HYPERLIPIDEMIA, UNSPECIFIED HYPERLIPIDEMIA TYPE: ICD-10-CM

## 2025-06-20 PROCEDURE — 99214 OFFICE O/P EST MOD 30 MIN: CPT | Performed by: INTERNAL MEDICINE

## 2025-06-20 PROCEDURE — 3074F SYST BP LT 130 MM HG: CPT | Performed by: INTERNAL MEDICINE

## 2025-06-20 PROCEDURE — 1125F AMNT PAIN NOTED PAIN PRSNT: CPT | Performed by: INTERNAL MEDICINE

## 2025-06-20 PROCEDURE — 3078F DIAST BP <80 MM HG: CPT | Performed by: INTERNAL MEDICINE

## 2025-06-20 RX ORDER — LOSARTAN POTASSIUM 50 MG/1
50 TABLET ORAL DAILY
Qty: 100 TABLET | Refills: 3 | Status: SHIPPED | OUTPATIENT
Start: 2025-06-20 | End: 2026-07-25

## 2025-06-20 RX ORDER — PRAVASTATIN SODIUM 40 MG
40 TABLET ORAL NIGHTLY
Qty: 100 TABLET | Refills: 3 | Status: SHIPPED | OUTPATIENT
Start: 2025-06-20 | End: 2026-07-25

## 2025-06-20 ASSESSMENT — PAIN SCALES - GENERAL: PAINLEVEL_OUTOF10: 4=SLIGHT-MODERATE PAIN

## 2025-06-20 ASSESSMENT — FIBROSIS 4 INDEX: FIB4 SCORE: 1.69

## 2025-06-20 NOTE — PROGRESS NOTES
Subjective     Antonio Treviño is a 81 y.o. female who presents with No chief complaint on file.            HPI   84 year old woman with spinal stenosis s/p laminectomy complicated by periperal neuropathy, also with compression fracture - followed by neuro, osteoporosis and low vitamin d on forteo followed by endo, vitelligo, hx bppv s/p Epley with resolution, improved dry eye in setting of prior eye surgery, L wrist sprain s/p surgery and subsequent fracture, functional murmur (echo confirmed), leg cramps/venous insufficiency followed by vascular,  here for htn and hld f/u.  Pls see a/p.      ROS           Objective     VSS    Physical Exam  General: Pleasant woman in no apparent distress, alert, responding appropriately.                   Assessment & Plan       Labile htn, improved  -previously, was persistently at goal in the ams and higher than goal in evenings, moderate elevation to sbp 140s-150s despite multiple lifestyle and medication trials.    Further complicating the abvoe is that patient was lightheaded in the ams.  we dicussed a short actiing option in the Pms only such as hydralazine but the patinet has tried before and did not tolerate.  Also trialed amlodipine prevoiusly.  Pt. Did not start hctz as was getting lightheaded.  Of note, no beta blocker tried yet, could trial bisoprolol per utd or coreg.  Saw cardio, rec change to losartan 25 bid.  Renin 1.9, ney <10 on losartan  -tried bid losartn, no change in bp.  Then noticed over last few months bp suddenly was at/near goal in both ams and pms.  Patient notes only difference is she now is eating Watermelon rhind every evening, now sb below 140 in pms  -no sx      Plan  -Cont continue losartan 50 mg daily, simplify back to once daily dosing given twice daily did not seem to impact blood pressure.  Appreciate cardiology input to assist with this challenging situation     -cont hydration as previously discussed  -cont diet/exercise/low salt diet as  previously discussed.  Brief literature review does reveal an review article in nutrients 2022 which does suggest watermelon may have vascular effects.  Continue watermelon in evenings which may be helping, although did  about high sugar content in the setting of her prediabetes  -previously cautioned any chest pain or shortness of breath > 5 minutes, any significant motor weakness, speech issues, or facial droop, cautioned patient to report to the emergency department immediately.    Elevated ldl and ASCVD w/ prediabetes  -ldl prev 177.   Given the LDL was 160 and patient has developed the additional risk factor of evening hypertension, prediabetes, and patient's ASCVD is elevated (above 30%, although a significant portion of this is age-related), discussion of statin was previously had and patient amenable.  Recent LFTs normal.  Patient reports was previously on atorvastatin but was stopped due to possible myalgias, subsequent LDL  on crestor 10, excellent response.  Patient trialed stopping Crestor for 1 month but had no change in cramping, making Crestor/statin as the etiology of her cramps unlikely.  She self restarted Crestor.  However, on further discussion today patient is interested in trying pravastatin on the chance that there may be a link between statin and her muscle cramps - cramping specifially addressed on prior visit, please see notes  -fbg 1/2015 95, prediabetes may have imporved/reversed, pat is a healthy bmi and exerciseing regulary (walking 1 hour per day, also gym/swimming)    Plan  - Stop Crestor 10, start pravastatin 40.   cannot do high intensity on pravastatin, however as Crestor 10 was not high intensity either is reasonable to trial switch  -bg check as part of bmp above  - Continue diet/exercise  - Continue htn control  -bmp for monitoring given losartan changes, although likely stable  -f/u 3 months, repeat lipid and a1c at that time    Hematochezia, resolved  -patient  reports had a few episodes just when wiping in March, completely resolved.  Denies bumps around her anus consistent with hemorrhoids  -cbc primary cell lines nl 6/2025  -Colonoscopy in 2023 done with polyps removed  - Etiology of bleeding unclear    Plan  -offered fit testing, patient politely declines and wishes to observe for now.  Any recurrence would revisit and evaluate with FIT testing at a minimum.    Allergy clarification.  In EMR, when prescribing pravastatin and losartan, a cross reaction due to cornstarch appears to be present with her listed hydrocodone/acetaminophen allergy.  Patient has previously tolerated losartan without issue.  She also clarifies she is not allergic to opiates, but has nausea after surgery with them.  She is not sure that she has had hydrocodone in the past.  Prescribing pravastatin and losartan after discussion with patient    Anxiety  -patient reports does not need to discuss this currently, she will let me know if this changes in the future.    From Prior Visit, Not Addressed Today:      N/v/ha, likely tesion headache  -benign neuro exam.  Sx resolved, only 1x occurrence.  Has tight bilateral trapezius muscles    Plan  -Monitor clinically for now.  Discussed gentle neck and shoulder stretching exercises to address possible trapezius muscle spasm causing pain at the base of her occiput    Patient reports has stopped gabapentin, was previously tried for peripheral neuropathy symptoms s/p spine surgery followed by neuro,, defer to neurology for further mgmt, vascular also following for venous insuffiency, appreciate support of specialties.      R foot swelling, etio unclear, resolved  -Patient presents for follow-up of spontaneous onset right foot swelling.  D-dimer was elevated, patient presented to the emergency department where fortunately ultrasound was negative for DVT, patient was discharged home.  She reports complete resolution of swelling at this point, and on exam she has  no lower extremity edema.  Denies chest pain or shortness of breath.  Unclear reason for why D-dimer was elevated     Plan  -Discussed the potential for repeating ultrasound to ensure no blood clot, but given swelling has completely resolved, is asymptomatic, and had recent negative ultrasound, decided to monitor at present for now.  -Advised to continue to not utilize compression stockings as patient states swelling occurred after using and question if related to the fit being too tight thus obstructing blood flow  -Follow-up with vascular November, defer further management to them, appreciate support    Elevated ldl and ASCVD in setting of prediabetes  -ldl recently 177., prior 151, . .  Recent LFTs normal.  Patient reports was previously on atorvastatin but was stopped due to possible myalgias, will begin rosuvastatin 10 mg once daily, follow-up cholesterol levels in approximately 3 months.  Continue with hypertension treatment and diet/exercise interventions as previously discussed.  LDL 97 on crestor 10, excellent response.  Patient is reporting mild daily cramps that last for approximately 1 minute and resolve with massage in the setting of prior cramping issues, however patient reports these are slightly different.  Discussed the possibility of transitioning to pravastatin, patient prefers to continue on Crestor 10 mg daily.  Counseled any new or worsening cramping to alert me.  Today: Patient has hovered around prediabetes diagnosis, most recent is 6.0.  -Continue Crestor 10 mg daily, diet (patient has healthy BMI), linus chi, will follow-up A1c screening and lipids at either next visit or annual exam           Neck pressure likely 2/2 paraspinal/trapezius muscle spasms and poor posture/biomechanics, now worsening with intermittent radiculopathy  -No alarm symptoms.  Significant neck limitation of motion in addition to tight trapezius musculature along with poor biomechanics while reading and baseline hunched  "posture are likely contributing to neck spasms which are for what ever reason exacerbated when she is standing and walking.  Notes resolution when she lies on her back.  Suspect poor posture/biomechanics along with potentially anxiety/stress are contributing to tight muscles.  Tried Home physical therapy for neck mobilization and hot tub which initially improved her symptoms, although have worsened.  XR neck normal.    Plan  -Continue home PT pending establishment with formal PT in July, appreciate support  -Reviewed recommendations on ergonomics and proper posture, including pillow positioning    -alarm precautions reviewed  -Follow-up in 2 months.        Prediabtes  -prev resolved, now fbg 108.  mild  Discussed continued diet/exericse.          Leg pressure likely 2/2 mild venous insufficeincy  -Prev: Varicosities on legs and symptoms are consistent with possible mild venous insufficiency.Discussed pathophysiology.    Today: tried compression stockings via lymphedema clinic, did not help and perhaps worsened.  As an aside, reports prior HERMINIA nl.  Overall daily sx but improved with stretching, exercise, massage, leg elevation.  Sx may have some overlap with neuropathy sx from prior back surgery, as states today that sx are going above knees.  Overall sx initially were not bothersome to the patient, but per report are now's gradually worsening and becoming bothersome.    Plan  -Continue leg elevation, healthy physical activity  -Discussed prolonged standing will exacerbate  -Patient is a healthy BMI  -Patient reports is unable to tolerate compression stockings due to her peripheral neuropathy, if she is able to better optimize her peripheral neuropathy perhaps she could tolerate compression stockings which would be helpful.  She reports trialing gabapentin and Lyrica without success (\"brain fog\"), has tried other medications as well.  Referral placed for her to reestablish with her neurologist Dr. Osborn for further " "management, appreciate support  -Referral to vein center for possible additional suggestions or intervention, did discuss briefly some of the pros and cons related to vein stripping procedures    Bmp to prep for htn f/u, patient reports stable at home, otherwise not addressed    Hx peripheral neuropathy in setting of spinal stenosis  -reports has tried gabapentin, lyrica, other meds without success.  -foot exam benign, recommended lotion to mild dry spots with socks over her feet to prevent slipping if she would like  -referral to re-establish with Dr. Osborn, appreciate support.  Defer further management to Dr. Osborn        Anxiety with panic attacks - jarred screen  -no SI/HI/AH/VH.  Patient reports \"all her life\" she has been anxious.  She notes worrying about many things in general, although denies worrying about \"the little things\".  She states that she will begin to ruminate on things and then at times this will trigger periods where she has a fast heart rate, develops a headache, and feels significantly anxious for a period of 1 to 2 hours, where this then subsides.  Denies obsessions or rituals thus OCD unlikely, denies embarrassment around socialization thus social anxiety disorder unlikely, denies concern about \"escaping to safety\" thus agoraphobia unlikely, do need to clarify whether there is an inciting traumatic event.  However, symptoms seem diffuse.  PHQ 2 negative making depression unlikely.  Symptoms are likely consistent with panic attacks with possible generalized anxiety disorder versus elevated anxiety      Plan  -continue diet/exercise  -discussed pathophysiology of anxiety/panic attacks generally, rec cbt resources.  Anxiety resournces hangout given.  -Safety contract reviewed  -Follow-up next visit on CBT resources and also jarred screen  -Patient now trialing [facility] mindful/meditation (reported previous visit, not otherwise addressed)          mild tsh fluctauation due to age, likely no current " clinical significacne  -No clear symptoms referable to TSH, denies hot or cold intolerance, palpitations, skin or hair changes, does have chronic intermittent constipation that precedes TSH change and was present when patient had a normal TSH.  No thyromegaly.  -given age >70, TSH < 6.9, no clear symptoms, prior tsh nl, suggested we not treat at present and instead repeat TSH in 2-6 months, patient in agreement.  This is consistent with up-to-date recommendations.  Today: repeat TSH nl, d/w patient.  Follow clinicaly                Chronic dry cough, significantly improved but still present, possibly   secondary to postnasal drip  -Began after starting lisinopril, improved after discontinuing lisinopril but still occuring, patient has no smoking history, no shortness of breath, and lungs have serially been without wheezing, making asthma or COPD less likely.  Prevoiusly and currently patient denied post nasal drip symptoms.  Did mention history of GERD but no current symptoms of heartburn.    Currently cough occurs 2-3 times per day, not bothering pt.      Plan:   -Discussed patient could trial netti pot 1-2x daily if she would like as this is a very low risk intervention, etiology is not completely clear.  Patient states symptoms are not really bothering her, also reasonable to follow clinically        Ca counseling in the setting of calcium channel blocker  -Patient inquiring whether calcium supplementation could be harmful or her bone health could be affected by calcium channel blocker/amlodipine.  Discussed I am unaware of this being an issue, review of up-to-date adverse reactions does not list osteopenia or porosis as an issue, although osteoarthritis is listed as a very rare possible side effect.  Discussed with patient.  Also reviewed the recommendation of 1200 mg total calcium including diet and supplementation, suggested patient specifically quantitate her dietary intake as she reports eating  dairy.      Mild hyponatremia during recent ED visit for HA  -resolved, follow clinicaly.  Discussed lab results with patient.      Healthcare Maintenance (from last visit, not yet covered)    Suggest fit testing if wishes to continue, depression/anxiety/caregiver/ a1c screening/constipation.    Colonoscopy: Feb 2023 w/ polyp removed er pt  HIV: neg 2022  Syphilis: neg 2022  Hep C: neg 2022  A1c: fbs previously 100, a1c 5.6, pt acctively working on diet/exercise.  Check a1c for monioring  ASCVD:cont diet/exercise and crestor 10, also discus coronary artery scoring/ct, pt prefers to observe with diet/exercise at present.    Smoking: never  Alcohol Use: never  Recreational Drugs: never  Healthy Eating/Exercise: discussed veg inclined diet evidence based and exericse, patient is very physically active doing 1 hour walks, aquatherapy/exercise, home PT exercises, praised patients healthy efforts  Depression and Anxiety: neg phq2.  States some anxiety about age and future and what will happen to her  (who has dementia) if she was to pass away. States feeling ok overall though, declcines making visit to discuss further.  Offered to help support the patient at any time such as with coordinating support for care for her .  Immunizations: utd covid, pna, tdap, shingrix, flu shot    Mammogram: nl 2022, repeat 2024 if wishes.  PAP: s/p no non-cancer, prior pap nl, no further monitoring needed  DEXA: osteoporosis on tx per endo, apprecite support  Goals of Care: has advanced directive      Calf muscle cramps, etiology unclear  -Continue stretching and hydration  -Potassium magnesium and calcium all normal, discussed with patient  -Patient has tried taking a multivitamin with B vitamins, and in fact her B6 vitamin level was previously supratherapeutic, she reports no change in Symptomatology with this, as such likely no utility in trialing B vitamin supplementation.  Discussed possibility of using diltiazem,  patient politely declines at present, stating that the cramps are only lasting a few minutes when they occur and that overall they are tolerable.  Discussed that we could revisit this if worsens, and that quinidine is another option although has a very significant side effect profile and would not recommend unless cramps are debilitating.  -subsequent visit:Patient reports calf cramping is only intermittently occurring, is doing the stretches daily and feels these are potentially helping.  Not otherwise addressed.  -today:. Reports improved with calf strecthing, hot tub.  Occasionally happenig now, not significantly bothering patient, follow clinnically      History of functional murmur  -Echo had only mild valvular abnormalities and aortic sclerosis, otherwise normal.  Discussed with patient, continue diet and exercise, no further intervention needed.  Likely flow murmur.    Mild leukopenia, etiology unclear  -Patient previously denied fevers, chills, night sweats, well-appearing, weight is stable in EMR.  Was previously slightly elevated at 13.2, now 4.3.  since drawing labs as above, will repeat cbc with manual diff to trend wbc.  -repeta cbc nl, follow clinically.            Constipation, improved  -Not fully addressed today.  Patient reports daily bowel movements although they are somewhat hard, states that she is eating increased fiber and using MiraLAX as needed.  Offered to discuss further at future visit, patient states she is fine with monitoring for now.     Spinal stenosis - appreciate neuro support, encourage patient to follow-up with them regarding any future management of her gabapentin or symptoms.    Endocrine referral placed per patient request for injectible osteoporosis therapy to re-establish, apprecirae support.  Counseled that need for repeating DEXA scans on medical therapy for osteoporosis is not clear, probably would not benefit with repeat before 5 years, but will defer to endocrine given  they are the primary team managing her Forteo injections.  Also noted that pth ordered by her previous endocrinologist was low, but ca and vit d nl.  Defer to endocrine for further mgmt.    Counseled patient that if she is eating a well totally well-balanced/healthy diet that she does not need to supplement with vitamins, and that they are not FDA approved so could contain contaminants.    Rtc 3 mo. prediabetes/elevated ldl   Annual exam due 3/2026.

## 2025-07-08 DIAGNOSIS — M80.00XG AGE-RELATED OSTEOPOROSIS WITH CURRENT PATHOLOGICAL FRACTURE WITH DELAYED HEALING, SUBSEQUENT ENCOUNTER: Primary | ICD-10-CM

## 2025-07-08 PROBLEM — M80.00XA AGE-RELATED OSTEOPOROSIS WITH CURRENT PATHOLOGICAL FRACTURE: Status: ACTIVE | Noted: 2025-07-08

## 2025-07-08 RX ORDER — LORAZEPAM 0.5 MG/1
0.5 TABLET ORAL PRN
OUTPATIENT
Start: 2025-07-15

## 2025-07-08 RX ORDER — ALBUTEROL SULFATE 5 MG/ML
2.5 SOLUTION RESPIRATORY (INHALATION) PRN
OUTPATIENT
Start: 2025-07-15

## 2025-07-08 RX ORDER — MEPERIDINE HYDROCHLORIDE 25 MG/ML
25 INJECTION INTRAMUSCULAR; INTRAVENOUS; SUBCUTANEOUS PRN
OUTPATIENT
Start: 2025-07-15

## 2025-07-08 RX ORDER — SODIUM CHLORIDE 9 MG/ML
1000 INJECTION, SOLUTION INTRAVENOUS PRN
OUTPATIENT
Start: 2025-07-15

## 2025-07-08 RX ORDER — DIPHENHYDRAMINE HYDROCHLORIDE 50 MG/ML
25 INJECTION, SOLUTION INTRAMUSCULAR; INTRAVENOUS PRN
OUTPATIENT
Start: 2025-07-15

## 2025-07-08 RX ORDER — ACETAMINOPHEN 325 MG/1
650 TABLET ORAL PRN
OUTPATIENT
Start: 2025-07-15

## 2025-07-08 RX ORDER — METHYLPREDNISOLONE SODIUM SUCCINATE 125 MG/2ML
125 INJECTION, POWDER, LYOPHILIZED, FOR SOLUTION INTRAMUSCULAR; INTRAVENOUS PRN
OUTPATIENT
Start: 2025-07-15

## 2025-07-08 RX ORDER — EPINEPHRINE 1 MG/ML(1)
0.5 AMPUL (ML) INJECTION PRN
OUTPATIENT
Start: 2025-07-15

## 2025-07-08 RX ORDER — ONDANSETRON 2 MG/ML
8 INJECTION INTRAMUSCULAR; INTRAVENOUS PRN
OUTPATIENT
Start: 2025-07-15

## 2025-07-08 RX ORDER — ONDANSETRON 8 MG/1
8 TABLET, ORALLY DISINTEGRATING ORAL PRN
OUTPATIENT
Start: 2025-07-15

## 2025-08-21 ENCOUNTER — APPOINTMENT (OUTPATIENT)
Dept: NEUROLOGY | Facility: MEDICAL CENTER | Age: 85
End: 2025-08-21
Attending: SPECIALIST
Payer: COMMERCIAL

## (undated) DEVICE — ELECTRODE 850 FOAM ADHESIVE - HYDROGEL RADIOTRNSPRNT (50/PK)

## (undated) DEVICE — LIGASURE LAPAROSCOPIC 5MM - (6EA/CA)

## (undated) DEVICE — SYRINGE 10 ML CONTROL LL (25EA/BX 4BX/CA)

## (undated) DEVICE — PACK LAPAROSCOPY - (1/CA)

## (undated) DEVICE — CANISTER SUCTION RIGID RED 1500CC (40EA/CA)

## (undated) DEVICE — GLOVE BIOGEL SZ 6.5 SURGICAL PF LTX (50PR/BX 4BX/CA)

## (undated) DEVICE — SUTURE 0 VICRYL PLUS CT-1 - 36 INCH (36/BX)

## (undated) DEVICE — SET EXTENSION WITH 2 PORTS (48EA/CA) ***PART #2C8610 IS A SUBSTITUTE*****

## (undated) DEVICE — ELECTRODE DUAL RETURN W/ CORD - (50/PK)

## (undated) DEVICE — DERMABOND ADVANCED - (12EA/BX)

## (undated) DEVICE — CHLORAPREP 26 ML APPLICATOR - ORANGE TINT(25/CA)

## (undated) DEVICE — WATER IRRIGATION STERILE 1000ML (12EA/CA)

## (undated) DEVICE — SUTURE GENERAL

## (undated) DEVICE — TUBING CLEARLINK DUO-VENT - C-FLO (48EA/CA)

## (undated) DEVICE — SET LEADWIRE 5 LEAD BEDSIDE DISPOSABLE ECG (1SET OF 5/EA)

## (undated) DEVICE — TUBE E-T HI-LO CUFF 6.5MM (10EA/BX)

## (undated) DEVICE — CATHETER IV 20 GA X 1-1/4 ---SURG.& SDS ONLY--- (50EA/BX)

## (undated) DEVICE — SUTURE 4-0 VICRYL PLUS FS-2 - 27 INCH (36/BX)

## (undated) DEVICE — KIT  I.V. START (100EA/CA)

## (undated) DEVICE — SET IRRIGATION CYSTOSCOPY TUBE L80 IN (20EA/CA)

## (undated) DEVICE — BAG RETRIEVAL 10ML (10EA/BX)

## (undated) DEVICE — NEEDLE NON SAFETY HYPO 22 GA X 1 1/2 IN (100/BX)

## (undated) DEVICE — ARMREST CRADLE FOAM - (2PR/PK 12PR/CA)

## (undated) DEVICE — CANISTER SUCTION 3000ML MECHANICAL FILTER AUTO SHUTOFF MEDI-VAC NONSTERILE LF DISP  (40EA/CA)

## (undated) DEVICE — GLOVE BIOGEL PI INDICATOR SZ 7.0 SURGICAL PF LF - (50/BX 4BX/CA)

## (undated) DEVICE — TROCAR 5X100 BLADED ADVANCE - FIXATION (6/BX)

## (undated) DEVICE — MASK ANESTHESIA ADULT  - (100/CA)

## (undated) DEVICE — GLOVE BIOGEL INDICATOR SZ 7SURGICAL PF LTX - (50/BX 4BX/CA)

## (undated) DEVICE — TUBING LAPAROSCOPIC PLUME DEVICE (10EA/CA)

## (undated) DEVICE — GLOVE BIOGEL SZ 8.5 SURGICAL PF LTX - (50PR/BX 4BX/CA)

## (undated) DEVICE — NEPTUNE 4 PORT MANIFOLD - (20/PK)

## (undated) DEVICE — TRAY SRGPRP PVP IOD WT PRP - (20/CA)

## (undated) DEVICE — GLOVE BIOGEL SZ 7 SURGICAL PF LTX - (50PR/BX 4BX/CA)

## (undated) DEVICE — GOWN SURGEONS X-LARGE - DISP. (30/CA)

## (undated) DEVICE — DRAPESURG STERI-DRAPE LONG - (10/BX 4BX/CA)

## (undated) DEVICE — PACK SINGLE BASIN - (6/CA)

## (undated) DEVICE — SODIUM CHL IRRIGATION 0.9% 1000ML (12EA/CA)

## (undated) DEVICE — SUCTION INSTRUMENT YANKAUER BULBOUS TIP W/O VENT (50EA/CA)

## (undated) DEVICE — SUTURE 2-0 30CM STRATAFIX SPIRAL PDO ***WAS PART #SXPD1B401 *****

## (undated) DEVICE — DRAPE UNDER BUTTOCKS FLUID - (20/CA)

## (undated) DEVICE — CATH, FOLEY SILICONE 16FR 5ML 2W

## (undated) DEVICE — UTERINE MANIP RUMI 6.7X8 - (5/BX)

## (undated) DEVICE — KIT ANESTHESIA W/CIRCUIT & 3/LT BAG W/FILTER (20EA/CA)

## (undated) DEVICE — SET SUCTION/IRRIGATION WITH DISPOSABLE TIP (6/CA )PART #0250-070-520 IS A SUB

## (undated) DEVICE — GLOVESZ 8.5 BIOGEL PI MICRO - PF LF (50PR/BX)

## (undated) DEVICE — PAD SANITARY 11IN MAXI IND WRAPPED  (12EA/PK 24PK/CA)

## (undated) DEVICE — ELECTROSURGICAL KOH EFFICIENT 3.0CM

## (undated) DEVICE — NEEDLE INSFL 120MM 14GA VRRS - (20/BX)

## (undated) DEVICE — LACTATED RINGERS INJ 1000 ML - (14EA/CA 60CA/PF)

## (undated) DEVICE — PROTECTOR ULNA NERVE - (36PR/CA)

## (undated) DEVICE — SENSOR SPO2 NEO LNCS ADHESIVE (20/BX) SEE USER NOTES

## (undated) DEVICE — TUBE CONNECTING SUCTION - CLEAR PLASTIC STERILE 72 IN (50EA/CA)

## (undated) DEVICE — GOWN WARMING STANDARD FLEX - (30/CA)

## (undated) DEVICE — TROCAR Z THREAD 11 X 100 - BLADED (6/BX)

## (undated) DEVICE — PENCIL ELECTSURG 10FT BTN SWH - (50/CA)

## (undated) DEVICE — CLOSURE SKIN STRIP 1/2 X 4 IN - (STERI STRIP) (50/BX 4BX/CA)

## (undated) DEVICE — HEAD HOLDER JUNIOR/ADULT

## (undated) DEVICE — JELLY SURGILUBE STERILE TUBE 4.25 OZ (1/EA)

## (undated) DEVICE — SYRINGE 50ML CATHETER TIP (40EA/BX 4BX/CA)

## (undated) DEVICE — SLEEVE VASO CALF MED - (10PR/CA)

## (undated) DEVICE — CLOSURE WOUND 1/4 X 4 (STERI - STRIP) (50/BX 4BX/CA)

## (undated) DEVICE — SUTURE 2-0 VICRYL PLUS CT-2 - 27 INCH (36/BX)

## (undated) DEVICE — ELECTRODE 5MM LHK LAPSCP STERILE DISP- MEGADYNE  (5/CA)

## (undated) DEVICE — SUTURE 0 VICRYL PLUS CT-2 - 27 INCH (36/BX)

## (undated) DEVICE — MANIFOLD NEPTUNE 1 PORT (20/PK)